# Patient Record
Sex: FEMALE | Race: WHITE | Employment: UNEMPLOYED | URBAN - METROPOLITAN AREA
[De-identification: names, ages, dates, MRNs, and addresses within clinical notes are randomized per-mention and may not be internally consistent; named-entity substitution may affect disease eponyms.]

---

## 2018-06-15 ENCOUNTER — HOSPITAL ENCOUNTER (OUTPATIENT)
Age: 78
Discharge: HOME | End: 2018-06-17
Payer: MEDICARE

## 2018-06-15 PROCEDURE — 82306 VITAMIN D 25 HYDROXY: CPT

## 2018-06-15 PROCEDURE — 80061 LIPID PANEL: CPT

## 2018-06-15 PROCEDURE — 80053 COMPREHEN METABOLIC PANEL: CPT

## 2018-06-15 PROCEDURE — 86038 ANTINUCLEAR ANTIBODIES: CPT

## 2018-06-15 PROCEDURE — 86431 RHEUMATOID FACTOR QUANT: CPT

## 2018-06-15 PROCEDURE — 84443 ASSAY THYROID STIM HORMONE: CPT

## 2018-06-16 LAB
ALBUMIN SERPL-MCNC: 3.9 G/DL (ref 3.5–5.2)
ALP BLD-CCNC: 86 U/L (ref 35–104)
ALT SERPL-CCNC: 15 U/L (ref 0–32)
ANION GAP SERPL CALCULATED.3IONS-SCNC: 17 MMOL/L (ref 7–16)
AST SERPL-CCNC: 20 U/L (ref 0–31)
BILIRUB SERPL-MCNC: 0.3 MG/DL (ref 0–1.2)
BUN BLDV-MCNC: 16 MG/DL (ref 8–23)
CALCIUM SERPL-MCNC: 9.7 MG/DL (ref 8.6–10.2)
CHLORIDE BLD-SCNC: 100 MMOL/L (ref 98–107)
CHOLESTEROL, TOTAL: 253 MG/DL (ref 0–199)
CO2: 22 MMOL/L (ref 22–29)
CREAT SERPL-MCNC: 0.8 MG/DL (ref 0.5–1)
GFR AFRICAN AMERICAN: >60
GFR NON-AFRICAN AMERICAN: >60 ML/MIN/1.73
GLUCOSE BLD-MCNC: 86 MG/DL (ref 74–109)
HDLC SERPL-MCNC: 38 MG/DL
LDL CHOLESTEROL CALCULATED: 190 MG/DL (ref 0–99)
POTASSIUM SERPL-SCNC: 4.5 MMOL/L (ref 3.5–5)
RHEUMATOID FACTOR: <10 IU/ML (ref 0–13)
SODIUM BLD-SCNC: 139 MMOL/L (ref 132–146)
TOTAL PROTEIN: 7.2 G/DL (ref 6.4–8.3)
TRIGL SERPL-MCNC: 125 MG/DL (ref 0–149)
TSH SERPL DL<=0.05 MIU/L-ACNC: 2.06 UIU/ML (ref 0.27–4.2)
VITAMIN D 25-HYDROXY: 11 NG/ML (ref 30–100)
VLDLC SERPL CALC-MCNC: 25 MG/DL

## 2018-06-18 LAB — ANTI-NUCLEAR ANTIBODY (ANA): NEGATIVE

## 2018-10-02 ENCOUNTER — HOSPITAL ENCOUNTER (OUTPATIENT)
Age: 78
Discharge: HOME | End: 2018-10-04
Payer: MEDICARE

## 2018-10-02 LAB
CHOLESTEROL, TOTAL: 173 MG/DL (ref 0–199)
HDLC SERPL-MCNC: 44 MG/DL
LDL CHOLESTEROL CALCULATED: 110 MG/DL (ref 0–99)
TRIGL SERPL-MCNC: 96 MG/DL (ref 0–149)
URIC ACID, SERUM: 4.2 MG/DL (ref 2.4–5.7)
VLDLC SERPL CALC-MCNC: 19 MG/DL

## 2018-10-02 PROCEDURE — 84550 ASSAY OF BLOOD/URIC ACID: CPT

## 2018-10-02 PROCEDURE — 80061 LIPID PANEL: CPT

## 2020-08-10 ENCOUNTER — HOSPITAL ENCOUNTER (OUTPATIENT)
Age: 80
Discharge: HOME | End: 2020-08-12
Payer: MEDICARE

## 2020-08-10 LAB
ALBUMIN SERPL-MCNC: 4.1 G/DL (ref 3.5–5.2)
ALP BLD-CCNC: 64 U/L (ref 35–104)
ALT SERPL-CCNC: 13 U/L (ref 0–32)
ANION GAP SERPL CALCULATED.3IONS-SCNC: 11 MMOL/L (ref 7–16)
AST SERPL-CCNC: 24 U/L (ref 0–31)
BILIRUB SERPL-MCNC: 0.3 MG/DL (ref 0–1.2)
BUN BLDV-MCNC: 17 MG/DL (ref 8–23)
CALCIUM SERPL-MCNC: 10.6 MG/DL (ref 8.6–10.2)
CHLORIDE BLD-SCNC: 102 MMOL/L (ref 98–107)
CO2: 28 MMOL/L (ref 22–29)
CREAT SERPL-MCNC: 0.9 MG/DL (ref 0.5–1)
GFR AFRICAN AMERICAN: >60
GFR NON-AFRICAN AMERICAN: >60 ML/MIN/1.73
GLUCOSE BLD-MCNC: 87 MG/DL (ref 74–99)
POTASSIUM SERPL-SCNC: 5.1 MMOL/L (ref 3.5–5)
SODIUM BLD-SCNC: 141 MMOL/L (ref 132–146)
TOTAL PROTEIN: 7.2 G/DL (ref 6.4–8.3)

## 2020-08-10 PROCEDURE — 80053 COMPREHEN METABOLIC PANEL: CPT

## 2022-11-15 LAB
ALBUMIN SERPL-MCNC: 4.4 G/DL (ref 3.5–5.2)
ALP BLD-CCNC: 82 U/L (ref 35–104)
ALT SERPL-CCNC: 14 U/L (ref 0–32)
ANION GAP SERPL CALCULATED.3IONS-SCNC: 9 MMOL/L (ref 7–16)
AST SERPL-CCNC: 23 U/L (ref 0–31)
BASOPHILS ABSOLUTE: 0.03 E9/L (ref 0–0.2)
BASOPHILS RELATIVE PERCENT: 0.5 % (ref 0–2)
BILIRUB SERPL-MCNC: 0.3 MG/DL (ref 0–1.2)
BUN BLDV-MCNC: 15 MG/DL (ref 6–23)
CALCIUM SERPL-MCNC: 11.4 MG/DL (ref 8.6–10.2)
CHLORIDE BLD-SCNC: 100 MMOL/L (ref 98–107)
CHOLESTEROL, TOTAL: 278 MG/DL (ref 0–199)
CO2: 29 MMOL/L (ref 22–29)
CREAT SERPL-MCNC: 0.8 MG/DL (ref 0.5–1)
EOSINOPHILS ABSOLUTE: 0.06 E9/L (ref 0.05–0.5)
EOSINOPHILS RELATIVE PERCENT: 1 % (ref 0–6)
GFR SERPL CREATININE-BSD FRML MDRD: >60 ML/MIN/1.73
GLUCOSE BLD-MCNC: 85 MG/DL (ref 74–99)
HBA1C MFR BLD: 5.7 % (ref 4–5.6)
HCT VFR BLD CALC: 47.9 % (ref 34–48)
HDLC SERPL-MCNC: 53 MG/DL
HEMOGLOBIN: 15.3 G/DL (ref 11.5–15.5)
IMMATURE GRANULOCYTES #: 0.01 E9/L
IMMATURE GRANULOCYTES %: 0.2 % (ref 0–5)
LDL CHOLESTEROL CALCULATED: 197 MG/DL (ref 0–99)
LYMPHOCYTES ABSOLUTE: 2.24 E9/L (ref 1.5–4)
LYMPHOCYTES RELATIVE PERCENT: 36.4 % (ref 20–42)
MCH RBC QN AUTO: 31.1 PG (ref 26–35)
MCHC RBC AUTO-ENTMCNC: 31.9 % (ref 32–34.5)
MCV RBC AUTO: 97.4 FL (ref 80–99.9)
MONOCYTES ABSOLUTE: 0.51 E9/L (ref 0.1–0.95)
MONOCYTES RELATIVE PERCENT: 8.3 % (ref 2–12)
NEUTROPHILS ABSOLUTE: 3.3 E9/L (ref 1.8–7.3)
NEUTROPHILS RELATIVE PERCENT: 53.6 % (ref 43–80)
PDW BLD-RTO: 14.2 FL (ref 11.5–15)
PLATELET # BLD: 209 E9/L (ref 130–450)
PMV BLD AUTO: 11.3 FL (ref 7–12)
POTASSIUM SERPL-SCNC: 5.3 MMOL/L (ref 3.5–5)
RBC # BLD: 4.92 E12/L (ref 3.5–5.5)
SODIUM BLD-SCNC: 138 MMOL/L (ref 132–146)
TOTAL PROTEIN: 7.7 G/DL (ref 6.4–8.3)
TRIGL SERPL-MCNC: 142 MG/DL (ref 0–149)
TSH SERPL DL<=0.05 MIU/L-ACNC: 2.22 UIU/ML (ref 0.27–4.2)
VITAMIN D 25-HYDROXY: 24 NG/ML (ref 30–100)
VLDLC SERPL CALC-MCNC: 28 MG/DL
WBC # BLD: 6.2 E9/L (ref 4.5–11.5)

## 2023-03-31 ENCOUNTER — APPOINTMENT (OUTPATIENT)
Dept: CT IMAGING | Age: 83
DRG: 193 | End: 2023-03-31
Payer: MEDICARE

## 2023-03-31 ENCOUNTER — HOSPITAL ENCOUNTER (INPATIENT)
Age: 83
LOS: 4 days | Discharge: HOME HEALTH | DRG: 193 | End: 2023-04-04
Attending: STUDENT IN AN ORGANIZED HEALTH CARE EDUCATION/TRAINING PROGRAM | Admitting: STUDENT IN AN ORGANIZED HEALTH CARE EDUCATION/TRAINING PROGRAM
Payer: MEDICARE

## 2023-03-31 ENCOUNTER — APPOINTMENT (OUTPATIENT)
Dept: GENERAL RADIOLOGY | Age: 83
DRG: 193 | End: 2023-03-31
Payer: MEDICARE

## 2023-03-31 DIAGNOSIS — R79.89 ELEVATED LACTIC ACID LEVEL: ICD-10-CM

## 2023-03-31 DIAGNOSIS — J18.9 PNEUMONIA OF BOTH LUNGS DUE TO INFECTIOUS ORGANISM, UNSPECIFIED PART OF LUNG: Primary | ICD-10-CM

## 2023-03-31 DIAGNOSIS — E83.52 HYPERCALCEMIA: ICD-10-CM

## 2023-03-31 DIAGNOSIS — J96.01 ACUTE RESPIRATORY FAILURE WITH HYPOXIA (HCC): ICD-10-CM

## 2023-03-31 DIAGNOSIS — I48.19 PERSISTENT ATRIAL FIBRILLATION (HCC): ICD-10-CM

## 2023-03-31 DIAGNOSIS — N17.9 AKI (ACUTE KIDNEY INJURY) (HCC): ICD-10-CM

## 2023-03-31 PROBLEM — R26.2 DISABILITY OF WALKING: Status: ACTIVE | Noted: 2017-12-20

## 2023-03-31 PROBLEM — J44.1 COPD EXACERBATION (HCC): Status: ACTIVE | Noted: 2023-03-31

## 2023-03-31 PROBLEM — E87.20 LACTIC ACIDOSIS: Status: ACTIVE | Noted: 2023-03-31

## 2023-03-31 PROBLEM — G60.3 IDIOPATHIC PROGRESSIVE POLYNEUROPATHY: Status: ACTIVE | Noted: 2017-12-20

## 2023-03-31 LAB
ANION GAP SERPL CALCULATED.3IONS-SCNC: 16 MMOL/L (ref 7–16)
B PARAP IS1001 DNA NPH QL NAA+NON-PROBE: NOT DETECTED
B PERT.PT PRMT NPH QL NAA+NON-PROBE: NOT DETECTED
B.E.: -1.9 MMOL/L (ref -3–3)
BASOPHILS # BLD: 0 E9/L (ref 0–0.2)
BASOPHILS NFR BLD: 0 % (ref 0–2)
BNP BLD-MCNC: 6194 PG/ML (ref 0–450)
BNP BLD-MCNC: 6843 PG/ML (ref 0–450)
BUN SERPL-MCNC: 42 MG/DL (ref 6–23)
C PNEUM DNA NPH QL NAA+NON-PROBE: NOT DETECTED
CALCIUM SERPL-MCNC: 11 MG/DL (ref 8.6–10.2)
CHLORIDE SERPL-SCNC: 98 MMOL/L (ref 98–107)
CO2 SERPL-SCNC: 23 MMOL/L (ref 22–29)
COHB: 1.8 % (ref 0–1.5)
CREAT SERPL-MCNC: 1.4 MG/DL (ref 0.5–1)
CRITICAL: ABNORMAL
D DIMER: 3127 NG/ML DDU
DATE ANALYZED: ABNORMAL
DATE OF COLLECTION: ABNORMAL
EOSINOPHIL # BLD: 0 E9/L (ref 0.05–0.5)
EOSINOPHIL NFR BLD: 0 % (ref 0–6)
ERYTHROCYTE [DISTWIDTH] IN BLOOD BY AUTOMATED COUNT: 14.6 FL (ref 11.5–15)
FLUAV RNA NPH QL NAA+NON-PROBE: NOT DETECTED
FLUBV RNA NPH QL NAA+NON-PROBE: NOT DETECTED
GLUCOSE SERPL-MCNC: 125 MG/DL (ref 74–99)
HADV DNA NPH QL NAA+NON-PROBE: NOT DETECTED
HCO3: 23.5 MMOL/L (ref 22–26)
HCOV 229E RNA NPH QL NAA+NON-PROBE: NOT DETECTED
HCOV HKU1 RNA NPH QL NAA+NON-PROBE: NOT DETECTED
HCOV NL63 RNA NPH QL NAA+NON-PROBE: NOT DETECTED
HCOV OC43 RNA NPH QL NAA+NON-PROBE: NOT DETECTED
HCT VFR BLD AUTO: 50.3 % (ref 34–48)
HGB BLD-MCNC: 15.9 G/DL (ref 11.5–15.5)
HHB: 11.9 % (ref 0–5)
HMPV RNA NPH QL NAA+NON-PROBE: DETECTED
HPIV1 RNA NPH QL NAA+NON-PROBE: NOT DETECTED
HPIV2 RNA NPH QL NAA+NON-PROBE: NOT DETECTED
HPIV3 RNA NPH QL NAA+NON-PROBE: NOT DETECTED
HPIV4 RNA NPH QL NAA+NON-PROBE: NOT DETECTED
INFLUENZA A BY PCR: NOT DETECTED
INFLUENZA B BY PCR: NOT DETECTED
LAB: ABNORMAL
LACTATE BLDV-SCNC: 2.4 MMOL/L (ref 0.5–2.2)
LACTATE BLDV-SCNC: 3.3 MMOL/L (ref 0.5–2.2)
LIPASE: 13 U/L (ref 13–60)
LYMPHOCYTES # BLD: 0.63 E9/L (ref 1.5–4)
LYMPHOCYTES NFR BLD: 6.1 % (ref 20–42)
Lab: ABNORMAL
M PNEUMO DNA NPH QL NAA+NON-PROBE: NOT DETECTED
MCH RBC QN AUTO: 31.2 PG (ref 26–35)
MCHC RBC AUTO-ENTMCNC: 31.6 % (ref 32–34.5)
MCV RBC AUTO: 98.6 FL (ref 80–99.9)
METHB: 0.3 % (ref 0–1.5)
MODE: ABNORMAL
MONOCYTES # BLD: 0.52 E9/L (ref 0.1–0.95)
MONOCYTES NFR BLD: 5.2 % (ref 2–12)
NEUTROPHILS # BLD: 9.35 E9/L (ref 1.8–7.3)
NEUTS SEG NFR BLD: 88.7 % (ref 43–80)
NRBC BLD-RTO: 0.9 /100 WBC
O2 CONTENT: 19.3 ML/DL
O2 SATURATION: 87.8 % (ref 92–98.5)
O2HB: 86 % (ref 94–97)
OPERATOR ID: 913
PATIENT TEMP: 37 C
PCO2: 42.3 MMHG (ref 35–45)
PH BLOOD GAS: 7.36 (ref 7.35–7.45)
PLATELET # BLD AUTO: 198 E9/L (ref 130–450)
PMV BLD AUTO: 10.9 FL (ref 7–12)
PO2: 55.5 MMHG (ref 75–100)
POTASSIUM SERPL-SCNC: 4.4 MMOL/L (ref 3.5–5)
RBC # BLD AUTO: 5.1 E12/L (ref 3.5–5.5)
RBC MORPH BLD: NORMAL
RSV RNA NPH QL NAA+NON-PROBE: NOT DETECTED
RV+EV RNA NPH QL NAA+NON-PROBE: NOT DETECTED
SARS-COV-2 RDRP RESP QL NAA+PROBE: NOT DETECTED
SARS-COV-2 RNA NPH QL NAA+NON-PROBE: NOT DETECTED
SODIUM SERPL-SCNC: 137 MMOL/L (ref 132–146)
SOURCE, BLOOD GAS: ABNORMAL
THB: 16 G/DL (ref 11.5–16.5)
TIME ANALYZED: 1700
TROPONIN, HIGH SENSITIVITY: 29 NG/L (ref 0–9)
TROPONIN, HIGH SENSITIVITY: 34 NG/L (ref 0–9)
WBC # BLD: 10.5 E9/L (ref 4.5–11.5)

## 2023-03-31 PROCEDURE — 85025 COMPLETE CBC W/AUTO DIFF WBC: CPT

## 2023-03-31 PROCEDURE — 6360000004 HC RX CONTRAST MEDICATION: Performed by: RADIOLOGY

## 2023-03-31 PROCEDURE — 93005 ELECTROCARDIOGRAM TRACING: CPT

## 2023-03-31 PROCEDURE — 99285 EMERGENCY DEPT VISIT HI MDM: CPT

## 2023-03-31 PROCEDURE — 2580000003 HC RX 258: Performed by: STUDENT IN AN ORGANIZED HEALTH CARE EDUCATION/TRAINING PROGRAM

## 2023-03-31 PROCEDURE — 2060000000 HC ICU INTERMEDIATE R&B

## 2023-03-31 PROCEDURE — 96367 TX/PROPH/DG ADDL SEQ IV INF: CPT

## 2023-03-31 PROCEDURE — 84484 ASSAY OF TROPONIN QUANT: CPT

## 2023-03-31 PROCEDURE — 6360000002 HC RX W HCPCS: Performed by: STUDENT IN AN ORGANIZED HEALTH CARE EDUCATION/TRAINING PROGRAM

## 2023-03-31 PROCEDURE — 83880 ASSAY OF NATRIURETIC PEPTIDE: CPT

## 2023-03-31 PROCEDURE — 87040 BLOOD CULTURE FOR BACTERIA: CPT

## 2023-03-31 PROCEDURE — APPSS60 APP SPLIT SHARED TIME 46-60 MINUTES: Performed by: NURSE PRACTITIONER

## 2023-03-31 PROCEDURE — 6360000002 HC RX W HCPCS

## 2023-03-31 PROCEDURE — 81001 URINALYSIS AUTO W/SCOPE: CPT

## 2023-03-31 PROCEDURE — 71275 CT ANGIOGRAPHY CHEST: CPT

## 2023-03-31 PROCEDURE — 6370000000 HC RX 637 (ALT 250 FOR IP)

## 2023-03-31 PROCEDURE — 87088 URINE BACTERIA CULTURE: CPT

## 2023-03-31 PROCEDURE — 80048 BASIC METABOLIC PNL TOTAL CA: CPT

## 2023-03-31 PROCEDURE — 85378 FIBRIN DEGRADE SEMIQUANT: CPT

## 2023-03-31 PROCEDURE — 96361 HYDRATE IV INFUSION ADD-ON: CPT

## 2023-03-31 PROCEDURE — 94640 AIRWAY INHALATION TREATMENT: CPT

## 2023-03-31 PROCEDURE — 2500000003 HC RX 250 WO HCPCS

## 2023-03-31 PROCEDURE — 71045 X-RAY EXAM CHEST 1 VIEW: CPT

## 2023-03-31 PROCEDURE — 96365 THER/PROPH/DIAG IV INF INIT: CPT

## 2023-03-31 PROCEDURE — 2580000003 HC RX 258

## 2023-03-31 PROCEDURE — 87502 INFLUENZA DNA AMP PROBE: CPT

## 2023-03-31 PROCEDURE — 83605 ASSAY OF LACTIC ACID: CPT

## 2023-03-31 PROCEDURE — 94660 CPAP INITIATION&MGMT: CPT

## 2023-03-31 PROCEDURE — 82805 BLOOD GASES W/O2 SATURATION: CPT

## 2023-03-31 PROCEDURE — 87635 SARS-COV-2 COVID-19 AMP PRB: CPT

## 2023-03-31 PROCEDURE — 94664 DEMO&/EVAL PT USE INHALER: CPT

## 2023-03-31 PROCEDURE — 83690 ASSAY OF LIPASE: CPT

## 2023-03-31 PROCEDURE — 0202U NFCT DS 22 TRGT SARS-COV-2: CPT

## 2023-03-31 PROCEDURE — 96375 TX/PRO/DX INJ NEW DRUG ADDON: CPT

## 2023-03-31 RX ORDER — IPRATROPIUM BROMIDE AND ALBUTEROL SULFATE 2.5; .5 MG/3ML; MG/3ML
1 SOLUTION RESPIRATORY (INHALATION) ONCE
Status: COMPLETED | OUTPATIENT
Start: 2023-03-31 | End: 2023-03-31

## 2023-03-31 RX ORDER — SODIUM CHLORIDE 0.9 % (FLUSH) 0.9 %
5-40 SYRINGE (ML) INJECTION PRN
Status: DISCONTINUED | OUTPATIENT
Start: 2023-03-31 | End: 2023-04-04 | Stop reason: HOSPADM

## 2023-03-31 RX ORDER — MAGNESIUM SULFATE IN WATER 40 MG/ML
2000 INJECTION, SOLUTION INTRAVENOUS ONCE
Status: COMPLETED | OUTPATIENT
Start: 2023-03-31 | End: 2023-03-31

## 2023-03-31 RX ORDER — IPRATROPIUM BROMIDE AND ALBUTEROL SULFATE 2.5; .5 MG/3ML; MG/3ML
3 SOLUTION RESPIRATORY (INHALATION) ONCE
Status: COMPLETED | OUTPATIENT
Start: 2023-03-31 | End: 2023-03-31

## 2023-03-31 RX ORDER — SODIUM CHLORIDE 0.9 % (FLUSH) 0.9 %
5-40 SYRINGE (ML) INJECTION EVERY 12 HOURS SCHEDULED
Status: DISCONTINUED | OUTPATIENT
Start: 2023-03-31 | End: 2023-04-04 | Stop reason: HOSPADM

## 2023-03-31 RX ORDER — ALBUTEROL SULFATE 2.5 MG/3ML
2.5 SOLUTION RESPIRATORY (INHALATION) EVERY 4 HOURS PRN
Status: DISCONTINUED | OUTPATIENT
Start: 2023-03-31 | End: 2023-04-04 | Stop reason: HOSPADM

## 2023-03-31 RX ORDER — ACETAMINOPHEN 650 MG/1
650 SUPPOSITORY RECTAL EVERY 6 HOURS PRN
Status: DISCONTINUED | OUTPATIENT
Start: 2023-03-31 | End: 2023-04-04 | Stop reason: HOSPADM

## 2023-03-31 RX ORDER — 0.9 % SODIUM CHLORIDE 0.9 %
1000 INTRAVENOUS SOLUTION INTRAVENOUS ONCE
Status: COMPLETED | OUTPATIENT
Start: 2023-03-31 | End: 2023-03-31

## 2023-03-31 RX ORDER — IPRATROPIUM BROMIDE AND ALBUTEROL SULFATE 2.5; .5 MG/3ML; MG/3ML
1 SOLUTION RESPIRATORY (INHALATION)
Status: DISCONTINUED | OUTPATIENT
Start: 2023-03-31 | End: 2023-03-31

## 2023-03-31 RX ORDER — MECLIZINE HCL 12.5 MG/1
25 TABLET ORAL 3 TIMES DAILY PRN
Status: DISCONTINUED | OUTPATIENT
Start: 2023-03-31 | End: 2023-04-04 | Stop reason: HOSPADM

## 2023-03-31 RX ORDER — DOXYCYCLINE HYCLATE 100 MG/1
100 CAPSULE ORAL EVERY 12 HOURS SCHEDULED
Status: DISCONTINUED | OUTPATIENT
Start: 2023-03-31 | End: 2023-03-31

## 2023-03-31 RX ORDER — ALBUTEROL SULFATE 2.5 MG/3ML
2.5 SOLUTION RESPIRATORY (INHALATION)
Status: DISCONTINUED | OUTPATIENT
Start: 2023-04-01 | End: 2023-04-03

## 2023-03-31 RX ORDER — METHYLPREDNISOLONE SODIUM SUCCINATE 40 MG/ML
40 INJECTION, POWDER, LYOPHILIZED, FOR SOLUTION INTRAMUSCULAR; INTRAVENOUS EVERY 8 HOURS
Status: DISCONTINUED | OUTPATIENT
Start: 2023-03-31 | End: 2023-03-31

## 2023-03-31 RX ORDER — ONDANSETRON 2 MG/ML
4 INJECTION INTRAMUSCULAR; INTRAVENOUS EVERY 6 HOURS PRN
Status: DISCONTINUED | OUTPATIENT
Start: 2023-03-31 | End: 2023-04-04 | Stop reason: HOSPADM

## 2023-03-31 RX ORDER — ONDANSETRON 4 MG/1
4 TABLET, ORALLY DISINTEGRATING ORAL EVERY 8 HOURS PRN
Status: DISCONTINUED | OUTPATIENT
Start: 2023-03-31 | End: 2023-04-04 | Stop reason: HOSPADM

## 2023-03-31 RX ORDER — DILTIAZEM HYDROCHLORIDE 5 MG/ML
10 INJECTION INTRAVENOUS ONCE
Status: COMPLETED | OUTPATIENT
Start: 2023-03-31 | End: 2023-04-01

## 2023-03-31 RX ORDER — DEXAMETHASONE SODIUM PHOSPHATE 10 MG/ML
10 INJECTION INTRAMUSCULAR; INTRAVENOUS ONCE
Status: DISCONTINUED | OUTPATIENT
Start: 2023-03-31 | End: 2023-03-31

## 2023-03-31 RX ORDER — ACETAMINOPHEN 500 MG
1000 TABLET ORAL ONCE
Status: DISCONTINUED | OUTPATIENT
Start: 2023-03-31 | End: 2023-04-04 | Stop reason: HOSPADM

## 2023-03-31 RX ORDER — MECLIZINE HYDROCHLORIDE 25 MG/1
TABLET ORAL
Status: ON HOLD | COMMUNITY
Start: 2023-01-03 | End: 2023-04-04 | Stop reason: HOSPADM

## 2023-03-31 RX ORDER — SODIUM CHLORIDE 9 MG/ML
INJECTION, SOLUTION INTRAVENOUS PRN
Status: DISCONTINUED | OUTPATIENT
Start: 2023-03-31 | End: 2023-04-04 | Stop reason: HOSPADM

## 2023-03-31 RX ORDER — DOXYCYCLINE HYCLATE 100 MG/1
100 CAPSULE ORAL EVERY 12 HOURS SCHEDULED
Status: DISCONTINUED | OUTPATIENT
Start: 2023-04-01 | End: 2023-04-04 | Stop reason: HOSPADM

## 2023-03-31 RX ORDER — ENOXAPARIN SODIUM 100 MG/ML
40 INJECTION SUBCUTANEOUS DAILY
Status: DISCONTINUED | OUTPATIENT
Start: 2023-04-01 | End: 2023-04-01

## 2023-03-31 RX ORDER — DILTIAZEM HYDROCHLORIDE 5 MG/ML
10 INJECTION INTRAVENOUS ONCE
Status: DISCONTINUED | OUTPATIENT
Start: 2023-03-31 | End: 2023-03-31 | Stop reason: CLARIF

## 2023-03-31 RX ORDER — METHYLPREDNISOLONE SODIUM SUCCINATE 40 MG/ML
40 INJECTION, POWDER, LYOPHILIZED, FOR SOLUTION INTRAMUSCULAR; INTRAVENOUS EVERY 8 HOURS
Status: DISCONTINUED | OUTPATIENT
Start: 2023-04-01 | End: 2023-04-02

## 2023-03-31 RX ORDER — ACETAMINOPHEN 325 MG/1
650 TABLET ORAL EVERY 6 HOURS PRN
Status: DISCONTINUED | OUTPATIENT
Start: 2023-03-31 | End: 2023-04-04 | Stop reason: HOSPADM

## 2023-03-31 RX ORDER — ASPIRIN 81 MG/1
81 TABLET, CHEWABLE ORAL DAILY
Status: DISCONTINUED | OUTPATIENT
Start: 2023-04-01 | End: 2023-04-02

## 2023-03-31 RX ORDER — METOPROLOL SUCCINATE 25 MG/1
25 TABLET, EXTENDED RELEASE ORAL DAILY
Status: DISCONTINUED | OUTPATIENT
Start: 2023-04-01 | End: 2023-04-02

## 2023-03-31 RX ORDER — 0.9 % SODIUM CHLORIDE 0.9 %
1000 INTRAVENOUS SOLUTION INTRAVENOUS ONCE
Status: DISCONTINUED | OUTPATIENT
Start: 2023-03-31 | End: 2023-03-31

## 2023-03-31 RX ORDER — METHYLPREDNISOLONE SODIUM SUCCINATE 125 MG/2ML
125 INJECTION, POWDER, LYOPHILIZED, FOR SOLUTION INTRAMUSCULAR; INTRAVENOUS ONCE
Status: COMPLETED | OUTPATIENT
Start: 2023-03-31 | End: 2023-03-31

## 2023-03-31 RX ORDER — SODIUM CHLORIDE, SODIUM LACTATE, POTASSIUM CHLORIDE, CALCIUM CHLORIDE 600; 310; 30; 20 MG/100ML; MG/100ML; MG/100ML; MG/100ML
INJECTION, SOLUTION INTRAVENOUS CONTINUOUS
Status: DISCONTINUED | OUTPATIENT
Start: 2023-03-31 | End: 2023-04-04

## 2023-03-31 RX ADMIN — DOXYCYCLINE 100 MG: 100 INJECTION, POWDER, LYOPHILIZED, FOR SOLUTION INTRAVENOUS at 18:46

## 2023-03-31 RX ADMIN — SODIUM CHLORIDE 1000 ML: 9 INJECTION, SOLUTION INTRAVENOUS at 20:37

## 2023-03-31 RX ADMIN — METHYLPREDNISOLONE SODIUM SUCCINATE 125 MG: 125 INJECTION, POWDER, FOR SOLUTION INTRAMUSCULAR; INTRAVENOUS at 18:12

## 2023-03-31 RX ADMIN — IOPAMIDOL 75 ML: 755 INJECTION, SOLUTION INTRAVENOUS at 21:06

## 2023-03-31 RX ADMIN — MAGNESIUM SULFATE HEPTAHYDRATE 2000 MG: 40 INJECTION, SOLUTION INTRAVENOUS at 18:20

## 2023-03-31 RX ADMIN — SODIUM CHLORIDE 1000 ML: 9 INJECTION, SOLUTION INTRAVENOUS at 18:16

## 2023-03-31 RX ADMIN — IPRATROPIUM BROMIDE AND ALBUTEROL SULFATE 1 AMPULE: 2.5; .5 SOLUTION RESPIRATORY (INHALATION) at 16:49

## 2023-03-31 RX ADMIN — WATER 2000 MG: 1 INJECTION INTRAMUSCULAR; INTRAVENOUS; SUBCUTANEOUS at 18:30

## 2023-03-31 RX ADMIN — IPRATROPIUM BROMIDE AND ALBUTEROL SULFATE 3 AMPULE: 2.5; .5 SOLUTION RESPIRATORY (INHALATION) at 18:01

## 2023-03-31 ASSESSMENT — PAIN - FUNCTIONAL ASSESSMENT: PAIN_FUNCTIONAL_ASSESSMENT: 0-10

## 2023-03-31 ASSESSMENT — PAIN SCALES - GENERAL: PAINLEVEL_OUTOF10: 7

## 2023-03-31 NOTE — ED PROVIDER NOTES
03/31/23 2105 -- -- -- -- 23 -- -- --   03/31/23 2102 (!) 127/95 -- -- (!) 113 18 -- -- --   03/31/23 2032 119/68 -- -- (!) 144 26 98 % -- --   03/31/23 2017 121/65 -- -- (!) 118 26 91 % -- --   03/31/23 2005 111/78 -- -- (!) 115 24 92 % -- --   03/31/23 2002 110/65 -- -- (!) 114 24 -- -- --   03/31/23 1947 117/73 -- -- (!) 112 21 93 % -- --   03/31/23 1910 110/61 -- -- -- -- -- -- --   03/31/23 1845 87/65 -- -- (!) 121 20 -- -- --   03/31/23 1832 -- -- -- -- 25 -- -- --   03/31/23 1830 -- 97.9 °F (36.6 °C) Oral -- -- -- -- --   03/31/23 1828 (!) 104/54 -- -- (!) 135 25 97 % -- --   03/31/23 1815 (!) 90/56 -- -- (!) 126 26 94 % -- --   03/31/23 1745 -- -- -- (!) 148 27 94 % -- --   03/31/23 1540 119/68 98.8 °F (37.1 °C) Oral 61 20 96 % 5' 8\" (1.727 m) 160 lb (72.6 kg)   03/31/23 1510 -- -- -- -- 20 90 % -- --       Oxygen Saturation Interpretation: Normal      Counseling:  I have spoken with the patient and discussed todays results, in addition to providing specific details for the plan of care and counseling regarding the diagnosis and prognosis. Their questions are answered at this time and they are agreeable with the plan of admission. This patient has remained hemodynamically stable during their ED course. Diagnosis:  1. Pneumonia of both lungs due to infectious organism, unspecified part of lung    2. WERNER (acute kidney injury) ([facility] [address]    3. Hypercalcemia    4. Acute respiratory failure with hypoxia (HCC)    5. Elevated lactic acid level    6. Persistent atrial fibrillation (HCC)        Disposition:  Patient's disposition: Admit to medical floor  Patient's condition is stable. Attending was present and available throughout encounter including all critical portions;  See Attending Note/Attestation for Final Plan      Lisa Pop DO  Resident  03/31/23 1243

## 2023-04-01 PROBLEM — R79.89 ELEVATED LACTIC ACID LEVEL: Status: ACTIVE | Noted: 2023-04-01

## 2023-04-01 PROBLEM — I48.19 PERSISTENT ATRIAL FIBRILLATION (HCC): Status: ACTIVE | Noted: 2023-04-01

## 2023-04-01 LAB
ALBUMIN SERPL-MCNC: 3.2 G/DL (ref 3.5–5.2)
ALP SERPL-CCNC: 96 U/L (ref 35–104)
ALT SERPL-CCNC: 14 U/L (ref 0–32)
ANION GAP SERPL CALCULATED.3IONS-SCNC: 11 MMOL/L (ref 7–16)
AST SERPL-CCNC: 20 U/L (ref 0–31)
BACTERIA URNS QL MICRO: ABNORMAL /HPF
BASOPHILS # BLD: 0 E9/L (ref 0–0.2)
BASOPHILS NFR BLD: 0 % (ref 0–2)
BILIRUB SERPL-MCNC: 0.5 MG/DL (ref 0–1.2)
BILIRUB UR QL STRIP: NEGATIVE
BUN SERPL-MCNC: 36 MG/DL (ref 6–23)
BURR CELLS: ABNORMAL
CALCIUM SERPL-MCNC: 10.7 MG/DL (ref 8.6–10.2)
CHLORIDE SERPL-SCNC: 102 MMOL/L (ref 98–107)
CLARITY UR: ABNORMAL
CO2 SERPL-SCNC: 25 MMOL/L (ref 22–29)
COLOR UR: YELLOW
CREAT SERPL-MCNC: 0.9 MG/DL (ref 0.5–1)
EKG ATRIAL RATE: 104 BPM
EKG Q-T INTERVAL: 256 MS
EKG QRS DURATION: 76 MS
EKG QTC CALCULATION (BAZETT): 378 MS
EKG R AXIS: 19 DEGREES
EKG T AXIS: 58 DEGREES
EKG VENTRICULAR RATE: 131 BPM
EOSINOPHIL # BLD: 0 E9/L (ref 0.05–0.5)
EOSINOPHIL NFR BLD: 0 % (ref 0–6)
EPI CELLS #/AREA URNS HPF: ABNORMAL /HPF
ERYTHROCYTE [DISTWIDTH] IN BLOOD BY AUTOMATED COUNT: 14.6 FL (ref 11.5–15)
GLUCOSE SERPL-MCNC: 136 MG/DL (ref 74–99)
GLUCOSE UR STRIP-MCNC: NEGATIVE MG/DL
HCT VFR BLD AUTO: 43.2 % (ref 34–48)
HGB BLD-MCNC: 13.9 G/DL (ref 11.5–15.5)
HGB UR QL STRIP: ABNORMAL
KETONES UR STRIP-MCNC: ABNORMAL MG/DL
LACTATE BLDV-SCNC: 1.8 MMOL/L (ref 0.5–2.2)
LEUKOCYTE ESTERASE UR QL STRIP: ABNORMAL
LYMPHOCYTES # BLD: 0.69 E9/L (ref 1.5–4)
LYMPHOCYTES NFR BLD: 7.1 % (ref 20–42)
MAGNESIUM SERPL-MCNC: 2.3 MG/DL (ref 1.6–2.6)
MCH RBC QN AUTO: 30.9 PG (ref 26–35)
MCHC RBC AUTO-ENTMCNC: 32.2 % (ref 32–34.5)
MCV RBC AUTO: 96 FL (ref 80–99.9)
MONOCYTES # BLD: 0.2 E9/L (ref 0.1–0.95)
MONOCYTES NFR BLD: 1.8 % (ref 2–12)
NEUTROPHILS # BLD: 8.91 E9/L (ref 1.8–7.3)
NEUTS SEG NFR BLD: 90.2 % (ref 43–80)
NITRITE UR QL STRIP: NEGATIVE
NRBC BLD-RTO: 0 /100 WBC
PH UR STRIP: 5 [PH] (ref 5–9)
PLATELET # BLD AUTO: 207 E9/L (ref 130–450)
PMV BLD AUTO: 11.1 FL (ref 7–12)
POIKILOCYTES: ABNORMAL
POLYCHROMASIA: ABNORMAL
POTASSIUM SERPL-SCNC: 3.4 MMOL/L (ref 3.5–5)
PROCALCITONIN: 2.52 NG/ML (ref 0–0.08)
PROMYELOCYTES NFR BLD MANUAL: 0.9 % (ref 0–0)
PROT SERPL-MCNC: 7.3 G/DL (ref 6.4–8.3)
PROT UR STRIP-MCNC: 100 MG/DL
RBC # BLD AUTO: 4.5 E12/L (ref 3.5–5.5)
RBC #/AREA URNS HPF: ABNORMAL /HPF (ref 0–2)
SODIUM SERPL-SCNC: 138 MMOL/L (ref 132–146)
SP GR UR STRIP: 1.02 (ref 1–1.03)
TEAR DROP CELLS: ABNORMAL
UROBILINOGEN UR STRIP-ACNC: 2 E.U./DL
WBC # BLD: 9.9 E9/L (ref 4.5–11.5)
WBC #/AREA URNS HPF: ABNORMAL /HPF (ref 0–5)

## 2023-04-01 PROCEDURE — 2580000003 HC RX 258: Performed by: NURSE PRACTITIONER

## 2023-04-01 PROCEDURE — 6360000002 HC RX W HCPCS: Performed by: INTERNAL MEDICINE

## 2023-04-01 PROCEDURE — 6360000002 HC RX W HCPCS: Performed by: NURSE PRACTITIONER

## 2023-04-01 PROCEDURE — 80053 COMPREHEN METABOLIC PANEL: CPT

## 2023-04-01 PROCEDURE — 36415 COLL VENOUS BLD VENIPUNCTURE: CPT

## 2023-04-01 PROCEDURE — 94660 CPAP INITIATION&MGMT: CPT

## 2023-04-01 PROCEDURE — 83735 ASSAY OF MAGNESIUM: CPT

## 2023-04-01 PROCEDURE — 99222 1ST HOSP IP/OBS MODERATE 55: CPT | Performed by: STUDENT IN AN ORGANIZED HEALTH CARE EDUCATION/TRAINING PROGRAM

## 2023-04-01 PROCEDURE — 2500000003 HC RX 250 WO HCPCS: Performed by: STUDENT IN AN ORGANIZED HEALTH CARE EDUCATION/TRAINING PROGRAM

## 2023-04-01 PROCEDURE — 6370000000 HC RX 637 (ALT 250 FOR IP): Performed by: NURSE PRACTITIONER

## 2023-04-01 PROCEDURE — 85025 COMPLETE CBC W/AUTO DIFF WBC: CPT

## 2023-04-01 PROCEDURE — 94640 AIRWAY INHALATION TREATMENT: CPT

## 2023-04-01 PROCEDURE — 84145 PROCALCITONIN (PCT): CPT

## 2023-04-01 PROCEDURE — 83605 ASSAY OF LACTIC ACID: CPT

## 2023-04-01 PROCEDURE — 93010 ELECTROCARDIOGRAM REPORT: CPT | Performed by: INTERNAL MEDICINE

## 2023-04-01 PROCEDURE — 2060000000 HC ICU INTERMEDIATE R&B

## 2023-04-01 PROCEDURE — 2580000003 HC RX 258: Performed by: STUDENT IN AN ORGANIZED HEALTH CARE EDUCATION/TRAINING PROGRAM

## 2023-04-01 RX ORDER — ENOXAPARIN SODIUM 100 MG/ML
1 INJECTION SUBCUTANEOUS 2 TIMES DAILY
Status: DISCONTINUED | OUTPATIENT
Start: 2023-04-01 | End: 2023-04-02

## 2023-04-01 RX ORDER — ARFORMOTEROL TARTRATE 15 UG/2ML
15 SOLUTION RESPIRATORY (INHALATION) 2 TIMES DAILY
Status: DISCONTINUED | OUTPATIENT
Start: 2023-04-01 | End: 2023-04-04 | Stop reason: HOSPADM

## 2023-04-01 RX ORDER — BUDESONIDE 0.5 MG/2ML
0.5 INHALANT ORAL 2 TIMES DAILY
Status: DISCONTINUED | OUTPATIENT
Start: 2023-04-01 | End: 2023-04-04 | Stop reason: HOSPADM

## 2023-04-01 RX ADMIN — SODIUM CHLORIDE, POTASSIUM CHLORIDE, SODIUM LACTATE AND CALCIUM CHLORIDE: 600; 310; 30; 20 INJECTION, SOLUTION INTRAVENOUS at 11:48

## 2023-04-01 RX ADMIN — ALBUTEROL SULFATE 2.5 MG: 2.5 SOLUTION RESPIRATORY (INHALATION) at 21:25

## 2023-04-01 RX ADMIN — BUDESONIDE 500 MCG: 0.5 SUSPENSION RESPIRATORY (INHALATION) at 21:24

## 2023-04-01 RX ADMIN — DOXYCYCLINE HYCLATE 100 MG: 100 CAPSULE ORAL at 21:16

## 2023-04-01 RX ADMIN — ASPIRIN 81 MG CHEWABLE TABLET 81 MG: 81 TABLET CHEWABLE at 08:42

## 2023-04-01 RX ADMIN — SODIUM CHLORIDE 5 MG/HR: 900 INJECTION, SOLUTION INTRAVENOUS at 16:41

## 2023-04-01 RX ADMIN — ALBUTEROL SULFATE 2.5 MG: 2.5 SOLUTION RESPIRATORY (INHALATION) at 08:07

## 2023-04-01 RX ADMIN — SODIUM CHLORIDE, PRESERVATIVE FREE 10 ML: 5 INJECTION INTRAVENOUS at 08:43

## 2023-04-01 RX ADMIN — ARFORMOTEROL TARTRATE 15 MCG: 15 SOLUTION RESPIRATORY (INHALATION) at 21:24

## 2023-04-01 RX ADMIN — WATER 1000 MG: 1 INJECTION INTRAMUSCULAR; INTRAVENOUS; SUBCUTANEOUS at 16:42

## 2023-04-01 RX ADMIN — ENOXAPARIN SODIUM 40 MG: 100 INJECTION SUBCUTANEOUS at 08:42

## 2023-04-01 RX ADMIN — SODIUM CHLORIDE, PRESERVATIVE FREE 10 ML: 5 INJECTION INTRAVENOUS at 21:16

## 2023-04-01 RX ADMIN — DOXYCYCLINE HYCLATE 100 MG: 100 CAPSULE ORAL at 08:42

## 2023-04-01 RX ADMIN — SODIUM CHLORIDE, POTASSIUM CHLORIDE, SODIUM LACTATE AND CALCIUM CHLORIDE: 600; 310; 30; 20 INJECTION, SOLUTION INTRAVENOUS at 21:23

## 2023-04-01 RX ADMIN — METHYLPREDNISOLONE SODIUM SUCCINATE 40 MG: 40 INJECTION INTRAMUSCULAR; INTRAVENOUS at 13:57

## 2023-04-01 RX ADMIN — METHYLPREDNISOLONE SODIUM SUCCINATE 40 MG: 40 INJECTION INTRAMUSCULAR; INTRAVENOUS at 05:56

## 2023-04-01 RX ADMIN — METHYLPREDNISOLONE SODIUM SUCCINATE 40 MG: 40 INJECTION INTRAMUSCULAR; INTRAVENOUS at 21:16

## 2023-04-01 RX ADMIN — DILTIAZEM HYDROCHLORIDE 10 MG: 5 INJECTION INTRAVENOUS at 00:08

## 2023-04-01 RX ADMIN — METOPROLOL SUCCINATE 25 MG: 25 TABLET, EXTENDED RELEASE ORAL at 08:42

## 2023-04-01 RX ADMIN — ALBUTEROL SULFATE 2.5 MG: 2.5 SOLUTION RESPIRATORY (INHALATION) at 11:46

## 2023-04-01 RX ADMIN — ALBUTEROL SULFATE 2.5 MG: 2.5 SOLUTION RESPIRATORY (INHALATION) at 15:44

## 2023-04-01 RX ADMIN — SODIUM CHLORIDE 5 MG/HR: 900 INJECTION, SOLUTION INTRAVENOUS at 01:53

## 2023-04-01 RX ADMIN — ENOXAPARIN SODIUM 70 MG: 100 INJECTION SUBCUTANEOUS at 21:16

## 2023-04-01 NOTE — ED NOTES
Pt notified of need for urine specimen. Pt to notify staff via call light when they need to void.      Judith Lorenzo RN  03/31/23 2033

## 2023-04-01 NOTE — ED NOTES
Pt requesting to stay in home clothes and not change into hospital gown     Jb Johnston [facility]  03/31/23 1829

## 2023-04-01 NOTE — H&P
neoplastic in etiology. 3. Mildly prominent mediastinal lymph nodes which may be reactive or   metastatic. 4. Hypertrophied left adrenal gland. RECOMMENDATIONS:   Unavailable         XR CHEST PORTABLE   Final Result   Patchy airspace and interstitial opacities in upper and lower lobes could   indicate bilateral pneumonia. EKG:     ASSESSMENT:      Principal Problem:    Acute respiratory failure with hypoxia (HCC)  Active Problems:    Atrial fibrillation with RVR (HCC)    Pneumonia due to infectious organism    WERNER (acute kidney injury) (HCC)    Lactic acidosis    COPD exacerbation (HCC)  Resolved Problems:    * No resolved hospital problems. *      PLAN:  BUN/creatinine 42/1.4, lactic acid 2.4, proBNP 6194, troponin 34 and then 29, and D-dimer 3127. EKG showing atrial fibrillation with RVR. Heart rate 131. Afebrile. CTA negative for pulmonary embolism. Showing   \"Innumerable ill-defined pulmonary nodules, masses and consolidative-appearing opacities which may be infectious, inflammatory or metastatic. Mildly prominent mediastinal lymph nodes. \"  2 L saline bolus, Rocephin, doxycycline, DuoNeb x2, magnesium sulfate 2 g, and Solu-Medrol 125 mg in ED. 1.  Acute respiratory failure with hypoxia-likely multifactorial due to #3, #4, and #8. On room air at baseline. Currently on BiPAP with 50% FiO2. PO2 55.5 earlier on 4 L nasal cannula. Wean oxygen as tolerated. DuoNebs every 4 hours while awake with albuterol as needed. 2.  Septic-heart rate 130s, lactic acid 3.3 and then 2.4, hypoxic, and acute kidney injury. Blood cultures x2 done. 2 L saline given in ED. Followed by IV fluid at 100 an hour. Rocephin and doxycycline started in ED. Monitor for deterioration. 3.  Pneumonia-check urine antigens. Check procalcitonin. Viral versus bacterial versus malignancy. Check sputum culture. 4.  Human metapneumouvirus-contact isolation. Respiratory treatments as needed.   Supportive

## 2023-04-02 LAB
ALBUMIN SERPL-MCNC: 3 G/DL (ref 3.5–5.2)
ALP SERPL-CCNC: 87 U/L (ref 35–104)
ALT SERPL-CCNC: 17 U/L (ref 0–32)
ANION GAP SERPL CALCULATED.3IONS-SCNC: 10 MMOL/L (ref 7–16)
AST SERPL-CCNC: 23 U/L (ref 0–31)
BASOPHILS # BLD: 0 E9/L (ref 0–0.2)
BASOPHILS NFR BLD: 0 % (ref 0–2)
BILIRUB SERPL-MCNC: 0.4 MG/DL (ref 0–1.2)
BUN SERPL-MCNC: 36 MG/DL (ref 6–23)
CALCIUM SERPL-MCNC: 10.6 MG/DL (ref 8.6–10.2)
CHLORIDE SERPL-SCNC: 105 MMOL/L (ref 98–107)
CO2 SERPL-SCNC: 24 MMOL/L (ref 22–29)
CREAT SERPL-MCNC: 0.8 MG/DL (ref 0.5–1)
EOSINOPHIL # BLD: 0 E9/L (ref 0.05–0.5)
EOSINOPHIL NFR BLD: 0 % (ref 0–6)
ERYTHROCYTE [DISTWIDTH] IN BLOOD BY AUTOMATED COUNT: 14.8 FL (ref 11.5–15)
GLUCOSE SERPL-MCNC: 139 MG/DL (ref 74–99)
HCT VFR BLD AUTO: 40.1 % (ref 34–48)
HGB BLD-MCNC: 12.9 G/DL (ref 11.5–15.5)
LYMPHOCYTES # BLD: 0.94 E9/L (ref 1.5–4)
LYMPHOCYTES NFR BLD: 9 % (ref 20–42)
MAGNESIUM SERPL-MCNC: 2 MG/DL (ref 1.6–2.6)
MCH RBC QN AUTO: 31.3 PG (ref 26–35)
MCHC RBC AUTO-ENTMCNC: 32.2 % (ref 32–34.5)
MCV RBC AUTO: 97.3 FL (ref 80–99.9)
METAMYELOCYTES NFR BLD MANUAL: 1 % (ref 0–1)
MONOCYTES # BLD: 0.85 E9/L (ref 0.1–0.95)
MONOCYTES NFR BLD: 9 % (ref 2–12)
MYELOCYTES NFR BLD MANUAL: 2 % (ref 0–0)
NEUTROPHILS # BLD: 7.61 E9/L (ref 1.8–7.3)
NEUTS SEG NFR BLD: 78 % (ref 43–80)
OVALOCYTES: ABNORMAL
PLATELET # BLD AUTO: 191 E9/L (ref 130–450)
PMV BLD AUTO: 10.7 FL (ref 7–12)
POIKILOCYTES: ABNORMAL
POLYCHROMASIA: ABNORMAL
POTASSIUM SERPL-SCNC: 3.9 MMOL/L (ref 3.5–5)
PROT SERPL-MCNC: 7 G/DL (ref 6.4–8.3)
RBC # BLD AUTO: 4.12 E12/L (ref 3.5–5.5)
SODIUM SERPL-SCNC: 139 MMOL/L (ref 132–146)
TARGET CELLS: ABNORMAL
TSH SERPL-MCNC: 0.03 UIU/ML (ref 0.27–4.2)
VARIANT LYMPHS NFR BLD: 1 % (ref 0–4)
WBC # BLD: 9.4 E9/L (ref 4.5–11.5)

## 2023-04-02 PROCEDURE — 99232 SBSQ HOSP IP/OBS MODERATE 35: CPT | Performed by: INTERNAL MEDICINE

## 2023-04-02 PROCEDURE — 2580000003 HC RX 258: Performed by: STUDENT IN AN ORGANIZED HEALTH CARE EDUCATION/TRAINING PROGRAM

## 2023-04-02 PROCEDURE — 2060000000 HC ICU INTERMEDIATE R&B

## 2023-04-02 PROCEDURE — 2500000003 HC RX 250 WO HCPCS: Performed by: STUDENT IN AN ORGANIZED HEALTH CARE EDUCATION/TRAINING PROGRAM

## 2023-04-02 PROCEDURE — 6370000000 HC RX 637 (ALT 250 FOR IP): Performed by: PHYSICIAN ASSISTANT

## 2023-04-02 PROCEDURE — 94660 CPAP INITIATION&MGMT: CPT

## 2023-04-02 PROCEDURE — 86300 IMMUNOASSAY TUMOR CA 15-3: CPT

## 2023-04-02 PROCEDURE — 83735 ASSAY OF MAGNESIUM: CPT

## 2023-04-02 PROCEDURE — 36415 COLL VENOUS BLD VENIPUNCTURE: CPT

## 2023-04-02 PROCEDURE — 6360000002 HC RX W HCPCS: Performed by: NURSE PRACTITIONER

## 2023-04-02 PROCEDURE — APPSS60 APP SPLIT SHARED TIME 46-60 MINUTES: Performed by: PHYSICIAN ASSISTANT

## 2023-04-02 PROCEDURE — 99222 1ST HOSP IP/OBS MODERATE 55: CPT | Performed by: INTERNAL MEDICINE

## 2023-04-02 PROCEDURE — 6370000000 HC RX 637 (ALT 250 FOR IP): Performed by: NURSE PRACTITIONER

## 2023-04-02 PROCEDURE — 6360000002 HC RX W HCPCS: Performed by: INTERNAL MEDICINE

## 2023-04-02 PROCEDURE — 2500000003 HC RX 250 WO HCPCS: Performed by: INTERNAL MEDICINE

## 2023-04-02 PROCEDURE — 85025 COMPLETE CBC W/AUTO DIFF WBC: CPT

## 2023-04-02 PROCEDURE — 94640 AIRWAY INHALATION TREATMENT: CPT

## 2023-04-02 PROCEDURE — 80053 COMPREHEN METABOLIC PANEL: CPT

## 2023-04-02 PROCEDURE — 2700000000 HC OXYGEN THERAPY PER DAY

## 2023-04-02 PROCEDURE — 2580000003 HC RX 258: Performed by: NURSE PRACTITIONER

## 2023-04-02 PROCEDURE — 84443 ASSAY THYROID STIM HORMONE: CPT

## 2023-04-02 RX ORDER — PREDNISONE 20 MG/1
40 TABLET ORAL DAILY
Status: DISCONTINUED | OUTPATIENT
Start: 2023-04-02 | End: 2023-04-04 | Stop reason: HOSPADM

## 2023-04-02 RX ORDER — METHYLPREDNISOLONE SODIUM SUCCINATE 40 MG/ML
40 INJECTION, POWDER, LYOPHILIZED, FOR SOLUTION INTRAMUSCULAR; INTRAVENOUS EVERY 12 HOURS
Status: DISCONTINUED | OUTPATIENT
Start: 2023-04-02 | End: 2023-04-02

## 2023-04-02 RX ORDER — METOPROLOL SUCCINATE 25 MG/1
25 TABLET, EXTENDED RELEASE ORAL 2 TIMES DAILY
Status: DISCONTINUED | OUTPATIENT
Start: 2023-04-02 | End: 2023-04-04 | Stop reason: HOSPADM

## 2023-04-02 RX ADMIN — ARFORMOTEROL TARTRATE 15 MCG: 15 SOLUTION RESPIRATORY (INHALATION) at 19:41

## 2023-04-02 RX ADMIN — METOPROLOL SUCCINATE 25 MG: 25 TABLET, EXTENDED RELEASE ORAL at 09:20

## 2023-04-02 RX ADMIN — ALBUTEROL SULFATE 2.5 MG: 2.5 SOLUTION RESPIRATORY (INHALATION) at 08:30

## 2023-04-02 RX ADMIN — ALBUTEROL SULFATE 2.5 MG: 2.5 SOLUTION RESPIRATORY (INHALATION) at 19:41

## 2023-04-02 RX ADMIN — BUDESONIDE 500 MCG: 0.5 SUSPENSION RESPIRATORY (INHALATION) at 19:41

## 2023-04-02 RX ADMIN — ALBUTEROL SULFATE 2.5 MG: 2.5 SOLUTION RESPIRATORY (INHALATION) at 16:09

## 2023-04-02 RX ADMIN — BUDESONIDE 500 MCG: 0.5 SUSPENSION RESPIRATORY (INHALATION) at 08:30

## 2023-04-02 RX ADMIN — SODIUM CHLORIDE, PRESERVATIVE FREE 10 ML: 5 INJECTION INTRAVENOUS at 10:06

## 2023-04-02 RX ADMIN — ARFORMOTEROL TARTRATE 15 MCG: 15 SOLUTION RESPIRATORY (INHALATION) at 08:30

## 2023-04-02 RX ADMIN — SODIUM CHLORIDE, POTASSIUM CHLORIDE, SODIUM LACTATE AND CALCIUM CHLORIDE: 600; 310; 30; 20 INJECTION, SOLUTION INTRAVENOUS at 20:58

## 2023-04-02 RX ADMIN — ASPIRIN 81 MG CHEWABLE TABLET 81 MG: 81 TABLET CHEWABLE at 09:20

## 2023-04-02 RX ADMIN — ALBUTEROL SULFATE 2.5 MG: 2.5 SOLUTION RESPIRATORY (INHALATION) at 12:31

## 2023-04-02 RX ADMIN — WATER 1000 MG: 1 INJECTION INTRAMUSCULAR; INTRAVENOUS; SUBCUTANEOUS at 16:59

## 2023-04-02 RX ADMIN — ACETAMINOPHEN 650 MG: 325 TABLET ORAL at 04:22

## 2023-04-02 RX ADMIN — DOXYCYCLINE HYCLATE 100 MG: 100 CAPSULE ORAL at 20:51

## 2023-04-02 RX ADMIN — METHYLPREDNISOLONE SODIUM SUCCINATE 40 MG: 40 INJECTION INTRAMUSCULAR; INTRAVENOUS at 06:26

## 2023-04-02 RX ADMIN — DOXYCYCLINE HYCLATE 100 MG: 100 CAPSULE ORAL at 09:20

## 2023-04-02 RX ADMIN — SODIUM CHLORIDE, POTASSIUM CHLORIDE, SODIUM LACTATE AND CALCIUM CHLORIDE: 600; 310; 30; 20 INJECTION, SOLUTION INTRAVENOUS at 09:19

## 2023-04-02 RX ADMIN — SODIUM CHLORIDE 7.5 MG/HR: 900 INJECTION, SOLUTION INTRAVENOUS at 09:20

## 2023-04-02 RX ADMIN — MICONAZOLE NITRATE: 20 POWDER TOPICAL at 19:30

## 2023-04-02 RX ADMIN — APIXABAN 5 MG: 5 TABLET, FILM COATED ORAL at 20:51

## 2023-04-02 RX ADMIN — METOPROLOL SUCCINATE 25 MG: 25 TABLET, EXTENDED RELEASE ORAL at 20:51

## 2023-04-02 RX ADMIN — MICONAZOLE NITRATE: 20 POWDER TOPICAL at 12:34

## 2023-04-02 RX ADMIN — ENOXAPARIN SODIUM 70 MG: 100 INJECTION SUBCUTANEOUS at 09:20

## 2023-04-02 ASSESSMENT — PAIN DESCRIPTION - DESCRIPTORS: DESCRIPTORS: SORE

## 2023-04-02 ASSESSMENT — PAIN SCALES - GENERAL
PAINLEVEL_OUTOF10: 3
PAINLEVEL_OUTOF10: 0
PAINLEVEL_OUTOF10: 0

## 2023-04-02 ASSESSMENT — PAIN - FUNCTIONAL ASSESSMENT: PAIN_FUNCTIONAL_ASSESSMENT: ACTIVITIES ARE NOT PREVENTED

## 2023-04-02 ASSESSMENT — PAIN DESCRIPTION - ORIENTATION: ORIENTATION: RIGHT

## 2023-04-02 ASSESSMENT — PAIN DESCRIPTION - LOCATION: LOCATION: WRIST

## 2023-04-03 ENCOUNTER — APPOINTMENT (OUTPATIENT)
Dept: GENERAL RADIOLOGY | Age: 83
DRG: 193 | End: 2023-04-03
Payer: MEDICARE

## 2023-04-03 LAB
ALBUMIN SERPL-MCNC: 2.4 G/DL (ref 3.5–5.2)
ALP SERPL-CCNC: 55 U/L (ref 35–104)
ALT SERPL-CCNC: 24 U/L (ref 0–32)
ANION GAP SERPL CALCULATED.3IONS-SCNC: 8 MMOL/L (ref 7–16)
AST SERPL-CCNC: 33 U/L (ref 0–31)
BACTERIA UR CULT: NORMAL
BASOPHILS # BLD: 0 E9/L (ref 0–0.2)
BASOPHILS NFR BLD: 0 % (ref 0–2)
BILIRUB SERPL-MCNC: <0.2 MG/DL (ref 0–1.2)
BUN SERPL-MCNC: 37 MG/DL (ref 6–23)
CALCIUM SERPL-MCNC: 9.9 MG/DL (ref 8.6–10.2)
CANCER AG15-3 SERPL-ACNC: 26 U/ML (ref 0–31)
CANCER AG27-29 SERPL-ACNC: 33.5 U/ML
CHLORIDE SERPL-SCNC: 106 MMOL/L (ref 98–107)
CO2 SERPL-SCNC: 25 MMOL/L (ref 22–29)
CREAT SERPL-MCNC: 0.6 MG/DL (ref 0.5–1)
DOHLE BODIES: ABNORMAL
EOSINOPHIL # BLD: 0 E9/L (ref 0.05–0.5)
EOSINOPHIL NFR BLD: 0 % (ref 0–6)
ERYTHROCYTE [DISTWIDTH] IN BLOOD BY AUTOMATED COUNT: 15 FL (ref 11.5–15)
GLUCOSE SERPL-MCNC: 135 MG/DL (ref 74–99)
HCT VFR BLD AUTO: 40.2 % (ref 34–48)
HGB BLD-MCNC: 13.1 G/DL (ref 11.5–15.5)
HYPOCHROMIA: ABNORMAL
LV EF: 60 %
LVEF MODALITY: NORMAL
LYMPHOCYTES # BLD: 0.5 E9/L (ref 1.5–4)
LYMPHOCYTES NFR BLD: 4.3 % (ref 20–42)
MCH RBC QN AUTO: 31.7 PG (ref 26–35)
MCHC RBC AUTO-ENTMCNC: 32.6 % (ref 32–34.5)
MCV RBC AUTO: 97.3 FL (ref 80–99.9)
METAMYELOCYTES NFR BLD MANUAL: 4.3 % (ref 0–1)
MONOCYTES # BLD: 0.99 E9/L (ref 0.1–0.95)
MONOCYTES NFR BLD: 9.5 % (ref 2–12)
MYELOCYTES NFR BLD MANUAL: 4.3 % (ref 0–0)
NEUTROPHILS # BLD: 8.22 E9/L (ref 1.8–7.3)
NEUTS SEG NFR BLD: 74.1 % (ref 43–80)
NRBC BLD-RTO: 0 /100 WBC
PLATELET # BLD AUTO: 234 E9/L (ref 130–450)
PMV BLD AUTO: 10.8 FL (ref 7–12)
POIKILOCYTES: ABNORMAL
POLYCHROMASIA: ABNORMAL
POTASSIUM SERPL-SCNC: 4.5 MMOL/L (ref 3.5–5)
PROMYELOCYTES NFR BLD MANUAL: 2.6 % (ref 0–0)
PROT SERPL-MCNC: 5.5 G/DL (ref 6.4–8.3)
RBC # BLD AUTO: 4.13 E12/L (ref 3.5–5.5)
REASON FOR REJECTION: NORMAL
REJECTED TEST: NORMAL
SODIUM SERPL-SCNC: 139 MMOL/L (ref 132–146)
T3FREE SERPL-MCNC: 1.3 PG/ML (ref 2–4.4)
T4 FREE SERPL-MCNC: 0.9 NG/DL (ref 0.93–1.7)
TARGET CELLS: ABNORMAL
TOXIC GRANULATION: ABNORMAL
VACUOLATED NEUTROPHILS: ABNORMAL
VARIANT LYMPHS NFR BLD: 0.9 % (ref 0–4)
WBC # BLD: 9.9 E9/L (ref 4.5–11.5)

## 2023-04-03 PROCEDURE — 2060000000 HC ICU INTERMEDIATE R&B

## 2023-04-03 PROCEDURE — 6370000000 HC RX 637 (ALT 250 FOR IP): Performed by: NURSE PRACTITIONER

## 2023-04-03 PROCEDURE — 36415 COLL VENOUS BLD VENIPUNCTURE: CPT

## 2023-04-03 PROCEDURE — 87449 NOS EACH ORGANISM AG IA: CPT

## 2023-04-03 PROCEDURE — 93306 TTE W/DOPPLER COMPLETE: CPT

## 2023-04-03 PROCEDURE — 71045 X-RAY EXAM CHEST 1 VIEW: CPT

## 2023-04-03 PROCEDURE — 84439 ASSAY OF FREE THYROXINE: CPT

## 2023-04-03 PROCEDURE — 84481 FREE ASSAY (FT-3): CPT

## 2023-04-03 PROCEDURE — 94640 AIRWAY INHALATION TREATMENT: CPT

## 2023-04-03 PROCEDURE — 99221 1ST HOSP IP/OBS SF/LOW 40: CPT | Performed by: INTERNAL MEDICINE

## 2023-04-03 PROCEDURE — 6370000000 HC RX 637 (ALT 250 FOR IP): Performed by: PHYSICIAN ASSISTANT

## 2023-04-03 PROCEDURE — 80053 COMPREHEN METABOLIC PANEL: CPT

## 2023-04-03 PROCEDURE — 2700000000 HC OXYGEN THERAPY PER DAY

## 2023-04-03 PROCEDURE — 6370000000 HC RX 637 (ALT 250 FOR IP)

## 2023-04-03 PROCEDURE — 2500000003 HC RX 250 WO HCPCS: Performed by: INTERNAL MEDICINE

## 2023-04-03 PROCEDURE — 6360000002 HC RX W HCPCS: Performed by: NURSE PRACTITIONER

## 2023-04-03 PROCEDURE — 2580000003 HC RX 258: Performed by: STUDENT IN AN ORGANIZED HEALTH CARE EDUCATION/TRAINING PROGRAM

## 2023-04-03 PROCEDURE — 94660 CPAP INITIATION&MGMT: CPT

## 2023-04-03 PROCEDURE — 85025 COMPLETE CBC W/AUTO DIFF WBC: CPT

## 2023-04-03 PROCEDURE — 6360000002 HC RX W HCPCS: Performed by: INTERNAL MEDICINE

## 2023-04-03 PROCEDURE — 2580000003 HC RX 258: Performed by: NURSE PRACTITIONER

## 2023-04-03 PROCEDURE — 94669 MECHANICAL CHEST WALL OSCILL: CPT

## 2023-04-03 RX ORDER — LEVALBUTEROL INHALATION SOLUTION 0.63 MG/3ML
0.63 SOLUTION RESPIRATORY (INHALATION) EVERY 6 HOURS
Status: DISCONTINUED | OUTPATIENT
Start: 2023-04-03 | End: 2023-04-04 | Stop reason: HOSPADM

## 2023-04-03 RX ADMIN — SODIUM CHLORIDE, POTASSIUM CHLORIDE, SODIUM LACTATE AND CALCIUM CHLORIDE: 600; 310; 30; 20 INJECTION, SOLUTION INTRAVENOUS at 06:47

## 2023-04-03 RX ADMIN — SODIUM CHLORIDE, PRESERVATIVE FREE 10 ML: 5 INJECTION INTRAVENOUS at 20:38

## 2023-04-03 RX ADMIN — SODIUM CHLORIDE, PRESERVATIVE FREE 5 ML: 5 INJECTION INTRAVENOUS at 09:32

## 2023-04-03 RX ADMIN — BUDESONIDE 500 MCG: 0.5 SUSPENSION RESPIRATORY (INHALATION) at 20:11

## 2023-04-03 RX ADMIN — LEVALBUTEROL 0.63 MG: 0.63 SOLUTION RESPIRATORY (INHALATION) at 20:11

## 2023-04-03 RX ADMIN — WATER 1000 MG: 1 INJECTION INTRAMUSCULAR; INTRAVENOUS; SUBCUTANEOUS at 18:09

## 2023-04-03 RX ADMIN — MICONAZOLE NITRATE: 20 POWDER TOPICAL at 08:31

## 2023-04-03 RX ADMIN — DOXYCYCLINE HYCLATE 100 MG: 100 CAPSULE ORAL at 08:30

## 2023-04-03 RX ADMIN — METOPROLOL SUCCINATE 25 MG: 25 TABLET, EXTENDED RELEASE ORAL at 20:38

## 2023-04-03 RX ADMIN — APIXABAN 5 MG: 5 TABLET, FILM COATED ORAL at 20:38

## 2023-04-03 RX ADMIN — ARFORMOTEROL TARTRATE 15 MCG: 15 SOLUTION RESPIRATORY (INHALATION) at 20:11

## 2023-04-03 RX ADMIN — ARFORMOTEROL TARTRATE 15 MCG: 15 SOLUTION RESPIRATORY (INHALATION) at 08:55

## 2023-04-03 RX ADMIN — METOPROLOL SUCCINATE 25 MG: 25 TABLET, EXTENDED RELEASE ORAL at 08:30

## 2023-04-03 RX ADMIN — LEVALBUTEROL 0.63 MG: 0.63 SOLUTION RESPIRATORY (INHALATION) at 13:31

## 2023-04-03 RX ADMIN — ALBUTEROL SULFATE 2.5 MG: 2.5 SOLUTION RESPIRATORY (INHALATION) at 08:54

## 2023-04-03 RX ADMIN — DOXYCYCLINE HYCLATE 100 MG: 100 CAPSULE ORAL at 20:38

## 2023-04-03 RX ADMIN — APIXABAN 5 MG: 5 TABLET, FILM COATED ORAL at 08:30

## 2023-04-03 RX ADMIN — PREDNISONE 40 MG: 20 TABLET ORAL at 08:30

## 2023-04-03 RX ADMIN — BUDESONIDE 500 MCG: 0.5 SUSPENSION RESPIRATORY (INHALATION) at 08:55

## 2023-04-03 ASSESSMENT — PAIN SCALES - GENERAL: PAINLEVEL_OUTOF10: 0

## 2023-04-03 NOTE — ACP (ADVANCE CARE PLANNING)
Advance Care Planning   Healthcare Decision Maker:    Primary Decision Maker: Lorena Green - [phone]    Click here to complete Healthcare Decision Makers including selection of the Healthcare Decision Maker Relationship (ie \"Primary\").

## 2023-04-03 NOTE — CONSULTS
PULMONARY CONSULTATION    Reason for Consultation: pneumonia/copd  Referring Physician: Nedra Kerr DO    Communication with the referring physician will be sent via the electronic medical record. HPI:    The patient is an 82year old female with a history of breast cancer and atrial fibrillation who presented with increased coughing, fatigue, weakness after returning home this week from a trip to [place]. She has a moist cough but is unable to expectorate. She has noticed wheezing and SOB. She has no h/o underlying COPD although does smoke 1/2 PPD and has smoked for nearly 60 years. She tested + for human metapneumovirus. CT with diffuse nodular disease, confluent airspace disease, ad mediastinal adenopathy. Past Medical History:   Diagnosis Date    Breast cancer ([facility] [address]     Heart palpitations     a fib       Past Surgical History:   Procedure Laterality Date    KNEE SURGERY         History reviewed. No pertinent family history.     Social History     Socioeconomic History    Marital status:      Spouse name: Not on file    Number of children: Not on file    Years of education: Not on file    Highest education level: Not on file   Occupational History    Not on file   Tobacco Use    Smoking status: Every Day     Packs/day: 1.00     Types: Cigarettes    Smokeless tobacco: Not on file    Tobacco comments:     1/2 pack   Substance and Sexual Activity    Alcohol use: No     Alcohol/week: 0.0 standard drinks    Drug use: No    Sexual activity: Not on file   Other Topics Concern    Not on file   Social History Narrative    Not on file     Social Determinants of Health     Financial Resource Strain: Not on file   Food Insecurity: Not on file   Transportation Needs: Not on file   Physical Activity: Not on file   Stress: Not on file   Social Connections: Not on file   Intimate Partner Violence: Not on file   Housing Stability: Not on file       Current Facility-Administered Medications   Medication
Value Date/Time    LABA1C 5.7 11/15/2022 01:51 PM    GLUCOSE 135 04/03/2023 05:52 AM     Lab Results   Component Value Date/Time    TRIG 142 11/15/2022 01:51 PM    HDL 53 11/15/2022 01:51 PM    LDLCALC 197 11/15/2022 01:51 PM    CHOL 278 11/15/2022 01:51 PM       Blood culture   Lab Results   Component Value Date/Time    BC 24 Hours no growth 03/31/2023 06:11 PM    BC 24 Hours no growth 03/31/2023 05:01 PM       Radiology:  XR CHEST PORTABLE   Final Result   Bilateral infiltrates most pronounced in the upper lobes. There is some   partial resolution in the left upper lobe since the prior study         CTA PULMONARY W CONTRAST   Final Result   1. No pulmonary embolism. 2. Innumerable ill-defined pulmonary nodules, masses and   consolidative-appearing opacities which may be infectious, inflammatory or   neoplastic in etiology. 3. Mildly prominent mediastinal lymph nodes which may be reactive or   metastatic. 4. Hypertrophied left adrenal gland. RECOMMENDATIONS:   Unavailable         XR CHEST PORTABLE   Final Result   Patchy airspace and interstitial opacities in upper and lower lobes could   indicate bilateral pneumonia. Medical Records/Labs/Images Review:   I personally reviewed and summarized previous records   All labs and imaging were reviewed independently    [address], a 82 y.o.-old female seen in for management of evaluation of abnormal thyroid function     Abnormal thyroid function test  Labs showed Low TSH with low FT4 and FT3  Current abnormal TFT is likely from being on steroids.    Euthyroid sick syndrome might also playing a role   Will check rT3   We don't recommend any thyroid hormones replacement at this time   We recommend recheck thyroid panel 5-6 weeks after discharge       Acute hypoxic respiratory failure   Per primary and pulmonary service     Interdisciplinary plan for communication with healthcare providers:   Consult
consolidative-appearing opacities which may be infectious, inflammatory or   neoplastic in etiology. 3. Mildly prominent mediastinal lymph nodes which may be reactive or   metastatic. 4. Hypertrophied left adrenal gland. RECOMMENDATIONS:   Unavailable         XR CHEST PORTABLE   Final Result   Patchy airspace and interstitial opacities in upper and lower lobes could   indicate bilateral pneumonia. ASSESSMENT/PLAN :    82years old female with history of left breast CVA stage II, hormone positive HER2 positive status post lumpectomy axilla lymph node dissection followed by mastectomy for positive margins, s/p adjuvant radiation therapy, TCH, anastrozole. Admitted with hypoxic respiratory failure, positive for human metapneumovirus. CT chest showed nodules in lungs with mild mediastinal lymphadenopathy. Lung nodules and groundglass opacities likely related to underlying bilateral infection. Mild mediastinal lymphadenopathy likely reactive. With recent mammogram in August 2022 was negative for malignancy. No evidence of recurrence on mastectomy scar. We will get tumor markers. We will repeat a CT scan at 4 to 6 weeks. Cardiology consulted for rapid A-fib. Started on Lovenox atrial fibrillation. We will continue to follow. Thank you for the consult.       Electronically signed by Jose Francisco Aceves MD on 4/1/2023 at 8:43 PM
97%   BMI 26.55 kg/m²   Appearance: Awake, alert, no acute respiratory distress  Skin: Intact, no rash  Head: Normocephalic, atraumatic  ENMT: MMM, no rhinorrhea  Neck: Supple, no carotid bruits  Lungs: Clear to auscultation bilaterally. No wheezes, rales, or rhonchi. Cardiac: Regular rate and rhythm, +S1S2, no murmurs apparent  Abdomen: Soft, +bowel sounds  Extremities: Moves all extremities x 4, no lower extremity edema  Neurologic: No focal motor deficits apparent, normal mood and affect        Assessment  A-fib with RVR  PAF, not on OAC  Borderline troponin leak  Vertigo, on PRN Meclizine  Elevated BNP in the setting of fever and metapneumovirus/respiratory failure  Human meta pneumovirus  Acute respiratory failure  Low TSH  Hypokalemia   Fever  Cough  COPD with continued tobacco abuse  L Breast CA s/p L mastectomy (2015), chemotherapy (completed 2015) and XRT (completed 2016)    Plan:   Regarding A-fib with RVR, continue on metoprolol succinate and uptitrate for optimal rate control  Wean off diltiazem and uptitrate beta-blocker for optimal rate control  Eliquis is initiated for anticoagulation  Awaiting echocardiogram to guide therapy  Once infectious etiology and metapneumovirus resolved consider Lexiscan myocardial perfusion stress test for further evaluation  Elevated BNP likely due to infectious etiology, continue to monitor closely  If echocardiogram shows low EF please call cardiology back to discuss guideline directed medical therapy and further evaluation. Cardiology will sign off. Please call with questions.           Lizbeth Kinney MD  [facility]) Cardiology

## 2023-04-04 VITALS
SYSTOLIC BLOOD PRESSURE: 135 MMHG | OXYGEN SATURATION: 92 % | HEIGHT: 68 IN | WEIGHT: 185 LBS | DIASTOLIC BLOOD PRESSURE: 71 MMHG | RESPIRATION RATE: 18 BRPM | BODY MASS INDEX: 28.04 KG/M2 | HEART RATE: 74 BPM | TEMPERATURE: 97.7 F

## 2023-04-04 LAB
LEGIONELLA AG UR QL: NORMAL
S PNEUM AG SPEC QL: NORMAL

## 2023-04-04 PROCEDURE — 2700000000 HC OXYGEN THERAPY PER DAY

## 2023-04-04 PROCEDURE — 97161 PT EVAL LOW COMPLEX 20 MIN: CPT

## 2023-04-04 PROCEDURE — 84482 T3 REVERSE: CPT

## 2023-04-04 PROCEDURE — 6360000002 HC RX W HCPCS: Performed by: INTERNAL MEDICINE

## 2023-04-04 PROCEDURE — 94640 AIRWAY INHALATION TREATMENT: CPT

## 2023-04-04 PROCEDURE — 6370000000 HC RX 637 (ALT 250 FOR IP): Performed by: NURSE PRACTITIONER

## 2023-04-04 PROCEDURE — 6360000002 HC RX W HCPCS: Performed by: NURSE PRACTITIONER

## 2023-04-04 PROCEDURE — 97535 SELF CARE MNGMENT TRAINING: CPT

## 2023-04-04 PROCEDURE — 97165 OT EVAL LOW COMPLEX 30 MIN: CPT

## 2023-04-04 PROCEDURE — 94660 CPAP INITIATION&MGMT: CPT

## 2023-04-04 PROCEDURE — 6370000000 HC RX 637 (ALT 250 FOR IP): Performed by: PHYSICIAN ASSISTANT

## 2023-04-04 PROCEDURE — 2580000003 HC RX 258: Performed by: NURSE PRACTITIONER

## 2023-04-04 RX ORDER — FLUTICASONE FUROATE, UMECLIDINIUM BROMIDE AND VILANTEROL TRIFENATATE 100; 62.5; 25 UG/1; UG/1; UG/1
1 POWDER RESPIRATORY (INHALATION) DAILY
Qty: 1 EACH | Refills: 3 | Status: SHIPPED | OUTPATIENT
Start: 2023-04-04

## 2023-04-04 RX ORDER — MECLIZINE HYDROCHLORIDE 25 MG/1
25 TABLET ORAL 3 TIMES DAILY PRN
Qty: 30 TABLET | Refills: 0 | Status: SHIPPED | OUTPATIENT
Start: 2023-04-04 | End: 2023-04-14

## 2023-04-04 RX ORDER — PREDNISONE 20 MG/1
40 TABLET ORAL DAILY
Qty: 6 TABLET | Refills: 0 | Status: SHIPPED | OUTPATIENT
Start: 2023-04-05 | End: 2023-04-08

## 2023-04-04 RX ORDER — DOXYCYCLINE HYCLATE 100 MG
100 TABLET ORAL 2 TIMES DAILY
Qty: 14 TABLET | Refills: 0 | Status: SHIPPED | OUTPATIENT
Start: 2023-04-04 | End: 2023-04-11

## 2023-04-04 RX ORDER — ALBUTEROL SULFATE 2.5 MG/3ML
2.5 SOLUTION RESPIRATORY (INHALATION) EVERY 4 HOURS PRN
Qty: 120 EACH | Refills: 3 | Status: SHIPPED | OUTPATIENT
Start: 2023-04-04

## 2023-04-04 RX ORDER — METOPROLOL SUCCINATE 25 MG/1
25 TABLET, EXTENDED RELEASE ORAL 2 TIMES DAILY
Qty: 30 TABLET | Refills: 3 | Status: SHIPPED | OUTPATIENT
Start: 2023-04-04

## 2023-04-04 RX ORDER — CEFUROXIME AXETIL 500 MG/1
500 TABLET ORAL 2 TIMES DAILY
Qty: 14 TABLET | Refills: 0 | Status: SHIPPED | OUTPATIENT
Start: 2023-04-04 | End: 2023-04-11

## 2023-04-04 RX ADMIN — DOXYCYCLINE HYCLATE 100 MG: 100 CAPSULE ORAL at 08:01

## 2023-04-04 RX ADMIN — ARFORMOTEROL TARTRATE 15 MCG: 15 SOLUTION RESPIRATORY (INHALATION) at 08:57

## 2023-04-04 RX ADMIN — APIXABAN 5 MG: 5 TABLET, FILM COATED ORAL at 08:00

## 2023-04-04 RX ADMIN — MICONAZOLE NITRATE: 20 POWDER TOPICAL at 08:09

## 2023-04-04 RX ADMIN — LEVALBUTEROL 0.63 MG: 0.63 SOLUTION RESPIRATORY (INHALATION) at 01:02

## 2023-04-04 RX ADMIN — SODIUM CHLORIDE, PRESERVATIVE FREE 10 ML: 5 INJECTION INTRAVENOUS at 08:01

## 2023-04-04 RX ADMIN — PREDNISONE 40 MG: 20 TABLET ORAL at 08:01

## 2023-04-04 RX ADMIN — LEVALBUTEROL 0.63 MG: 0.63 SOLUTION RESPIRATORY (INHALATION) at 13:18

## 2023-04-04 RX ADMIN — BUDESONIDE 500 MCG: 0.5 SUSPENSION RESPIRATORY (INHALATION) at 08:57

## 2023-04-04 RX ADMIN — LEVALBUTEROL 0.63 MG: 0.63 SOLUTION RESPIRATORY (INHALATION) at 08:57

## 2023-04-04 RX ADMIN — METOPROLOL SUCCINATE 25 MG: 25 TABLET, EXTENDED RELEASE ORAL at 08:01

## 2023-04-04 NOTE — PLAN OF CARE
Problem: Discharge Planning  Goal: Discharge to home or other facility with appropriate resources  4/4/2023 0055 by [place] Drew RN  Outcome: Progressing  4/4/2023 0055 by [place] Drew RN  Outcome: Progressing     Problem: Safety - Adult  Goal: Free from fall injury  4/4/2023 0055 by [place] Drew RN  Outcome: Progressing  4/4/2023 0055 by [place] Drew RN  Outcome: Progressing

## 2023-04-04 NOTE — CARE COORDINATION
Social Work:    Advised by unit of expected discharge tomorrow rather than today. Notified Simeon Lea at [facility]o at [facility].     Electronically signed by ROBERTO Diane on 4/4/2023 at 2:36 PM
Social Work:    Assisted with medical POA forms. Copy in chart. Original given to patient.     Electronically signed by ROBERTO Mcintyre on 4/4/2023 at 3:58 PM
Social Work:    Follow-up visit was made with Mrs. Bart Higuera this a.m. Social work discussed [address] vs SNF & home DME. Mrs. Bart Higuera prefers return home with [address] & DME and will have assistance from her granddaughter as needed. Social work called a tentative referral in to [facility] for home 02, a wheeled walker, and possible nebulizer. (Need orders) Social work also called a referral to [facility] for nursing and therapy.   (Need orders)    Electronically signed by ROBERTO Hopper on 4/4/2023 at 1:58 PM
Social Work:    Social work now advised of discharge home today. Social service notified both [address].     Electronically signed by ROBERTO Mcintyre on 4/4/2023 at 3:23 PM
Plan: Hakeem Johnson ESSENTIAL/PLUS / Product Type: *No Product type* /     Does insurance require precert for SNF: Yes    Potential assistance Purchasing Medications:    Meds-to-Beds request:        [facility] - Alirio Osborne, [address]  [address] [address]  Phone: [phone] Fax: [phone]      Notes:    Factors facilitating achievement of predicted outcomes: Family support, Cooperative, Pleasant, Sense of humor, and Good insight into deficits    Barriers to discharge: lives alone  The Plan for Transition of Care is related to the following treatment goals of Hypercalcemia [E83.52]  Persistent atrial fibrillation ([address] [I48.19]  WERNER (acute kidney injury) ([address] [N17.9]  Acute respiratory failure with hypoxia ([address] [J96.01]  Elevated lactic acid level [R79.89]  Pneumonia of both lungs due to infectious organism, unspecified part of lung [Q06.8]    IF APPLICABLE: The Patient and/or patient representative Megan Gilbert and her family were provided with a choice of provider and agrees with the discharge plan. Freedom of choice list with basic dialogue that supports the patient's individualized plan of care/goals and shares the quality data associated with the providers was provided to: Patient   Patient Representative Name: n/a    The Patient and/or Patient Representative Agree with the Discharge Plan?  Yes    Siddharth Her [facility]  Case Management Department  Ph: [phone] Fax: [phone]

## 2023-04-05 LAB
BACTERIA BLD CULT: NORMAL
BACTERIA BLD CULT: NORMAL

## 2023-04-06 NOTE — PROGRESS NOTES
04/02/23 0018   NIV Type   $NIV $Daily Charge   Mode (S)  Bilevel  (refuses use , standby)
24 hour chart check complete.  Elvi Don, TOMAS
24 hour chart check complete.  Jo Ann Bautista RN
Brief internal medicine progress note:    Patient seen, examined, H&P and billing done on this calendar day. I also saw and examined patient. Patient on 5 mg Cardizem drip and heart rate around 100 she is still in A-fib. A-fib seems to be a chronic diagnosis for her but patient is not on any anticoagulation. I am not sure why. We will start Lovenox at this time in case any biopsies or other procedures are to be planned, and afterwards can start [address] Cardiology consult to be placed. Patient does not seem to believe she has ever seen a cardiologist before. Patient with history of breast cancer and has multiple nodular masses and consolidative appearing opacities. She has been seen by pulmonary who feels these are likely infectious and right now is continuing antibiotics. Patient definitely with bronchospasm at this time and is on steroids and nebulizers.     Elmer Ching MD
Brown Carroll M.D.,[facility]  Lovely Rubalcava D.O., F.A.C.O.I., Eugene Lang M.D. Tejas Hopkins M.D. Marianne Hylton D.O. Daily Pulmonary Progress Note    Patient:  Aggie Christianson 82 y.o. female MRN: [id number]     Date of Service: 4/3/2023      Synopsis     We are following patient for bacterial pneumonia, COPD, respiratory failure    \"CC\" SOB    Code status: Full      Subjective      Patient was seen and examined sitting up in bed on 2 liters NC oxygen. Has not been wearing bipap at HS as ordered. She states she feel just a little bit better. Less cough and dyspnea. Switched to oral steroids. 2 D echo completed at bedside. IV fluids per primary. Off cardizem infusion. Free T 4 0.90, TSH 0.031. Review of Systems:  Constitutional: Denies fever, weight loss, night sweats, and fatigue  Skin: Denies pigmentation, dark lesions, and rashes   HEENT: Denies hearing loss, tinnitus, ear drainage, epistaxis, sore throat, and hoarseness. Cardiovascular: Denies palpitations, chest pain, and chest pressure.   Respiratory: + SOB, improved aeration and wheezing  Gastrointestinal: Denies nausea, vomiting, poor appetite, diarrhea, heartburn or reflux  Genitourinary: Denies dysuria, frequency, urgency or hematuria  Musculoskeletal: Denies myalgias, muscle weakness, and bone pain  Neurological: Denies dizziness, vertigo, headache, and focal weakness  Psychological: Denies anxiety and depression  Endocrine: Denies heat intolerance and cold intolerance  Hematopoietic/Lymphatic: Denies bleeding problems and blood transfusions    24-hour events:  Echo completed    Objective   Vitals: BP (!) 147/78   Pulse 74   Temp 98.7 °F (37.1 °C) (Oral)   Resp 18   Ht 5' 8\" (1.727 m)   Wt 179 lb 11.2 oz (81.5 kg)   SpO2 93%   BMI 27.32 kg/m²     I/O:    Intake/Output Summary (Last 24 hours) at 4/3/2023 1245  Last data filed at 4/3/2023 0600  Gross per 24 hour   Intake 1100 ml   Output --   Net 1100 ml
CLINICAL PHARMACY NOTE: MEDS TO BEDS    Total # of Prescriptions Filled: 3   The following medications were delivered to the patient:  Antifungal 2% powder   Eliquis 5mg  Meclizine 25 mg     Additional Documentation:  Delivered to patient --CP
Consult messaged to Dr. Arabella Patel
Consults placed to hematology and pulmonary.   Analy Yun RN
Date: 3/31/2023    Time: 9:06 PM    Patient Placed On BIPAP/CPAP/ Non-Invasive Ventilation? No, pt remains on bipap    If no must comment. Facial area red/color change? No           If YES are Blister/Lesion present? No   If yes must notify nursing staff  BIPAP/CPAP skin barrier?   Yes    Skin barrier type:mepilexlite       Comments:        Brian Puckett RCP
Date: 4/3/2023    Time: 8:53 PM    Patient Placed On BIPAP/CPAP/ Non-Invasive Ventilation? No    If no must comment. Facial area red/color change? No           If YES are Blister/Lesion present? No   If yes must notify nursing staff  BIPAP/CPAP skin barrier? No    Skin barrier type: na        Comments: Pt continues to refuse her BiPAP. Machine remains in room if pt changes her mind.         Onel Cain RCP
Marin Gil notified of patient having hallucinations seeing pictures moving on walls,\"splotches\" in the hallway, and letters moving down from the ceiling on the wall. Patient states \"I'm ok now, they stopped. \" Neurological assessment performed at time of hallucinations and no abnormalities noted, patient is A&Ox4, VS stable. Continue to monitor patient.
New consult messaged to [place]
PULSE OX ON ROOM AIR RESTING 92%  PULSE OX ON ROOM IR AMBULATING 85%  PULSE OX ON 2 LITERS AMBULATING RECOVERY 92%
PULSE OX ON ROOM AIR resting 87%  PULSE OX ON ROOM AIR AMBULATING 86%  PULSE OX ON 2 LITERS SITTING RECOVERY 94%  PULSE OX ON 2 LITERS AMBULATING RECOVERY  94%
Patient's granddaughter would like to note her work extension is [id number], and she will be known at work if called there by her last name Joelle Patel.
Physical Therapy  Facility/Department: [address] INTERNAL MEDICINE 2  Physical Therapy Initial Assessment    Name: Marquis Grey  : 1940  MRN: [id number]  Date of Service: 2023      Patient Diagnosis(es): The primary encounter diagnosis was Pneumonia of both lungs due to infectious organism, unspecified part of lung. Diagnoses of WERNER (acute kidney injury) ([address] Hypercalcemia, Acute respiratory failure with hypoxia ([address] Elevated lactic acid level, and Persistent atrial fibrillation ([address] were also pertinent to this visit. Past Medical History:  has a past medical history of Breast cancer ([address] and Heart palpitations. Past Surgical History:  has a past surgical history that includes knee surgery. Evaluating Therapist: [facility]    Room #:  7876/9767-S  Diagnosis:  Hypercalcemia [E83.52]  Persistent atrial fibrillation ([address] [I48.19]  WERNER (acute kidney injury) ([address] [N17.9]  Acute respiratory failure with hypoxia (HCC) [J96.01]  Elevated lactic acid level [R79.89]  Pneumonia of both lungs due to infectious organism, unspecified part of lung [J18.9]  PMHx/PSHx:  Breast CA  Precautions:  fall, alarm, contact isolation, O2      Social:  Pt lives with alone in a 1 floor plan ambulates with cane. Laundry is in basement and pt uses a walker in the basement. Initial Evaluation  Date: 23 Treatment      Short Term/ Long Term   Goals   Was pt agreeable to Eval/treatment? yes     Does pt have pain?  No c/o pain     Bed Mobility  Rolling: supervision  Supine to sit: supervision  Sit to supine: NT  Scooting: supervision  independent   Transfers Sit to stand: CGA  Stand to sit: min assist    independent   Ambulation    40 feet with cane  with min assist  100 feet with AAD independent   Stair Negotiation  Ascended and descended  NT   4-12 steps with 1 rail with supervision   LE strength     3+/5    4/5   balance      fair     AM-PAC Raw score               1724         Pt is alert and Oriented
Physician Progress Note      PATIENT:               Sasha Jauregui  CSN #:                  [id number]  :                       1940  ADMIT DATE:       3/31/2023 4:15 PM  [address] [facility] [place] DATE:  Joi Veronica  PROVIDER #:        Drea Angel DO          QUERY TEXT:    Patient admitted with pneumonia, Human metapneumovirus. Documentation reflects   sepsis in H&P. If possible, please document in the progress notes and   discharge summary if sepsis was: The medical record reflects the following:  Risk Factors: bacterial pneumonia, human metapneumovirus  Clinical Indicators: H&P \"Septic-heart rate 130s, lactic acid 3.3 and then   2.4, hypoxic, and acute kidney injury. Roly Joshua Patient presents with sepsis secondary   to community-acquired pneumonia. \"  Afib RVR, lactic 3.3, procalcitonin 2.52, respiratory failure  Treatment: bipap, steroids, abx    Thank you,  Corinna Landry RN, CCDS  Clinical Documentation   [email or web address]  Options provided:  -- Sepsis confirmed after study  -- Sepsis treated and resolved  -- Sepsis ruled out after study  -- Other - I will add my own diagnosis  -- Disagree - Not applicable / Not valid  -- Disagree - Clinically unable to determine / Unknown  -- Refer to Clinical Documentation Reviewer    PROVIDER RESPONSE TEXT:    Sepsis ruled out after study.     Query created by: Magalie Medrano on 4/3/2023 3:20 PM      Electronically signed by:  Drea Angel DO 2023 7:47 AM
Subjective: The patient is awake and alert. No problems overnight. Feeling better     Objective:    BP (!) 143/70   Pulse 63   Temp 97.2 °F (36.2 °C) (Temporal)   Resp 18   Ht 5' 8\" (1.727 m)   Wt 185 lb (83.9 kg)   SpO2 93%   BMI 28.13 kg/m²     Heart:  RRR, no murmurs, gallops, or rubs.   Lungs:  CTA bilaterally, no wheeze, rales or rhonchi  Abd: bowel sounds present, nontender, nondistended, no masses  Extrem:  No clubbing, cyanosis, or edema    Labs:  CBC:   Lab Results   Component Value Date/Time    WBC 9.9 04/03/2023 04:00 AM    RBC 4.13 04/03/2023 04:00 AM    HGB 13.1 04/03/2023 04:00 AM    HCT 40.2 04/03/2023 04:00 AM    MCV 97.3 04/03/2023 04:00 AM    MCH 31.7 04/03/2023 04:00 AM    MCHC 32.6 04/03/2023 04:00 AM    RDW 15.0 04/03/2023 04:00 AM     04/03/2023 04:00 AM    MPV 10.8 04/03/2023 04:00 AM     CMP:    Lab Results   Component Value Date/Time     04/03/2023 05:52 AM    K 4.5 04/03/2023 05:52 AM     04/03/2023 05:52 AM    CO2 25 04/03/2023 05:52 AM    BUN 37 04/03/2023 05:52 AM    CREATININE 0.6 04/03/2023 05:52 AM    GFRAA >60 08/18/2021 03:43 PM    LABGLOM >60 04/03/2023 05:52 AM    GLUCOSE 135 04/03/2023 05:52 AM    PROT 5.5 04/03/2023 05:52 AM    LABALBU 2.4 04/03/2023 05:52 AM    CALCIUM 9.9 04/03/2023 05:52 AM    BILITOT <0.2 04/03/2023 05:52 AM    ALKPHOS 55 04/03/2023 05:52 AM    AST 33 04/03/2023 05:52 AM    ALT 24 04/03/2023 05:52 AM     PT/INR:  No results found for: PROTIME, INR       Current Facility-Administered Medications:     levalbuterol (XOPENEX) nebulization 0.63 mg, 0.63 mg, Nebulization, Q6H, Catheline Flash, APRN - CNP, 0.63 mg at 04/04/23 0857    metoprolol succinate (TOPROL XL) extended release tablet 25 mg, 25 mg, Oral, BID, Antonia Lazo PA-C, 25 mg at 04/04/23 0801    miconazole (MICOTIN) 2 % powder, , Topical, BID, Natali Negron MD, Given at 04/04/23 0809    apixaban (ELIQUIS) tablet 5 mg, 5 mg, Oral, BID, Antonia Lazo PA-C, 5 mg at
Subjective: The patient is awake and alert. No problems overnight. Feeling somewhat better  SOB persist     Objective:    /65   Pulse 64   Temp 97.3 °F (36.3 °C) (Temporal)   Resp 18   Ht 5' 8\" (1.727 m)   Wt 179 lb 11.2 oz (81.5 kg)   SpO2 98%   BMI 27.32 kg/m²     Heart: Irreg/Irreg no murmurs, gallops, or rubs.   Lungs:  Diffuse wheeze  scattered coarse sounds  Abd: bowel sounds present, nontender, nondistended, no masses  Extrem:  No clubbing, cyanosis, or edema    Labs:  CBC:   Lab Results   Component Value Date/Time    WBC 9.9 04/03/2023 04:00 AM    RBC 4.13 04/03/2023 04:00 AM    HGB 13.1 04/03/2023 04:00 AM    HCT 40.2 04/03/2023 04:00 AM    MCV 97.3 04/03/2023 04:00 AM    MCH 31.7 04/03/2023 04:00 AM    MCHC 32.6 04/03/2023 04:00 AM    RDW 15.0 04/03/2023 04:00 AM     04/03/2023 04:00 AM    MPV 10.8 04/03/2023 04:00 AM     CMP:    Lab Results   Component Value Date/Time     04/03/2023 05:52 AM    K 4.5 04/03/2023 05:52 AM     04/03/2023 05:52 AM    CO2 25 04/03/2023 05:52 AM    BUN 37 04/03/2023 05:52 AM    CREATININE 0.6 04/03/2023 05:52 AM    GFRAA >60 08/18/2021 03:43 PM    LABGLOM >60 04/03/2023 05:52 AM    GLUCOSE 135 04/03/2023 05:52 AM    PROT 5.5 04/03/2023 05:52 AM    LABALBU 2.4 04/03/2023 05:52 AM    CALCIUM 9.9 04/03/2023 05:52 AM    BILITOT <0.2 04/03/2023 05:52 AM    ALKPHOS 55 04/03/2023 05:52 AM    AST 33 04/03/2023 05:52 AM    ALT 24 04/03/2023 05:52 AM     PT/INR:  No results found for: PROTIME, INR       Current Facility-Administered Medications:     metoprolol succinate (TOPROL XL) extended release tablet 25 mg, 25 mg, Oral, BID, Antonia Lazo PA-C, 25 mg at 04/02/23 2051    miconazole (MICOTIN) 2 % powder, , Topical, BID, Marely Rodriguez MD, Given at 04/02/23 1930    apixaban (ELIQUIS) tablet 5 mg, 5 mg, Oral, BID, Zeeshan Garcia PA-C, 5 mg at 04/02/23 2051    predniSONE (DELTASONE) tablet 40 mg, 40 mg, Oral, Daily, Susana Bangura, APRN - CNP
Topical BID    apixaban  5 mg Oral BID    arformoterol tartrate  15 mcg Nebulization BID    budesonide  0.5 mg Nebulization BID    acetaminophen  1,000 mg Oral Once    sodium chloride flush  5-40 mL IntraVENous 2 times per day    albuterol  2.5 mg Nebulization Q4H WA    cefTRIAXone (ROCEPHIN) IV  1,000 mg IntraVENous Q24H    And    doxycycline hyclate  100 mg Oral 2 times per day    methylPREDNISolone  40 mg IntraVENous Q8H       Physical Exam:  General Appearance: appears comfortable in no acute distress. HEENT: Normocephalic atraumatic without obvious abnormality   Neck: Lips, mucosa, and tongue normal.  Supple, symmetrical, trachea midline, no adenopathy;thyroid:  no enlargement/tenderness/nodules or JVD. Lung: Breath sounds bilateral wheezing and rhonchi. Respirations   unlabored. Symmetrical expansion. Heart: irregular and tachycardiac  Abdomen: Soft, NT, ND. BS present x 4 quadrants. No bruit or organomegaly. Extremities: Pedal pulses 2+ symmetric b/l. Extremities normal, no cyanosis, clubbing, or edema. Musculokeletal: No joint swelling, no muscle tenderness. ROM normal in all joints of extremities. Neurologic: Mental status: Alert and Oriented X3 . Pertinent/ New Labs and Imaging Studies     Imaging Personally Reviewed:    CTA chest 3/31/23:      Impression   1. No pulmonary embolism. 2. Innumerable ill-defined pulmonary nodules, masses and   consolidative-appearing opacities which may be infectious, inflammatory or   neoplastic in etiology. 3. Mildly prominent mediastinal lymph nodes which may be reactive or   metastatic. 4. Hypertrophied left adrenal gland.        ECHO: none on file      Labs:  Lab Results   Component Value Date/Time    WBC 9.4 04/02/2023 04:12 AM    HGB 12.9 04/02/2023 04:12 AM    HCT 40.1 04/02/2023 04:12 AM    MCV 97.3 04/02/2023 04:12 AM    MCH 31.3 04/02/2023 04:12 AM    MCHC 32.2 04/02/2023 04:12 AM    RDW 14.8 04/02/2023 04:12 AM     04/02/2023 04:12 AM
elevated at 2.52. Patient also with bronchospasm related to these. Trying to wean oxygen. Unfortunately patient with hallucinations and agitation due to steroids and IV steroids will be stopped. We will likely restart oral steroids tomorrow if tolerated. Continue DuoNebs. 2.    A-fib with RVR  [place] to be due to patient's hypoxia and respiratory issues, but today TSH is 0.031. Consulted cardiology. Plan is to increase beta-blockade and wean off Cardizem. This should also help with what appears to be a hyperthyroid state. Echocardiogram ordered and is pending. 3.    Hyperthyroidism  Endocrinology consulted. Checking free T3 and free T4. Increase beta-blocker dose, patient already received steroids. Will defer further treatment to endocrine. Appreciate all consultant help.     Electronically signed by Tatyana Dobson MD on 4/2/2023 at 5:54 PM
benefit from continued skilled OT to increase safety and independence with completion of ADL/IADL tasks for functional independence and quality of life. Treatment: OT treatment provided this date included:   Instruction/training on safety and adapted techniques for completion of ADLs. Instruction/training on safe functional mobility/transfer techniques. Instruction/training on energy conservation/work simplification techniques for implementation into ADL/IADL routines. Patient education provided regardin) importance of OOB activities during hospitalization, 2) potential benefits of having assistance from family/friends, as needed, with ADLs/IADLs, to ensure safety upon discharge. Patient indicated understanding. Further skilled OT treatment indicated to increase patient's safety and independence with completion of ADL/IADL tasks in order to maximize patient's functional independence and quality of life. Rehab Potential: Good for established goals. Patient / Family Goal: Patient anticipates returning home upon discharge. Patient and/or family were instructed on functional diagnosis, prognosis/goals, and OT plan of care. Demonstrated Good understanding. Eval Complexity: Low    Time In: 1505  Time Out: 1530  Total Treatment Time: 10 minutes      Minutes Units   OT Eval Low 09507 15 1   OT Eval Medium 75390     OT Eval High 56612     OT Re-Eval [id number]     Therapeutic Ex 49413     Therapeutic Activities 32338     ADL/Self Care 09001 10 1   Orthotic Management 34633     Neuro Re-Ed 11299     Non-Billable Time N/A ---     Evaluation time includes thorough review of current medical information, gathering information on past medical history/social history and prior level of function, completion of standardized testing/informal observation of tasks, assessment of data, and education on plan of care and goals. JASKARAN Ramos/L  License Number: [id number]
for: PROTIME, INR  No results for input(s): PROBNP in the last 72 hours. No results for input(s): PROCAL in the last 72 hours. Latest Reference Range & Units 03/31/23 17:00   pH, Blood Gas 7.350 - 7.450  7.363   PCO2 35.0 - 45.0 mmHg 42.3   pO2 75.0 - 100.0 mmHg 55.5 (L)   HCO3 22.0 - 26.0 mmol/L 23.5   Base Excess -3.0 - 3.0 mmol/L -1.9   O2 Sat 92.0 - 98.5 % 87.8 (L)     proBNP 6194  Procalcitonin 2.52  proBNP pending   Assessment:    Acute hypoxic respiratory failure  Metapneumovirus tracheobronchitis  Possible secondary bacterial pneumonia  Probable COPD with acute exacerbation  Atrial fibrillation with RVR  Nicotine dependence  Mediastinal adenopathy, reactive  Hx L breast cancer       Plan:   Wean oxygen to maintain SpO2 greater than 92% 2 liters NC  Ambulatory oxygen testing prior to dc-orders placed for home O2 2 L nasal cannula to be worn 24/7 upon discharge  Prednisone with quick taper, no new hallucinations reported-orders placed for discharge  Bronchodilators-brovana, pulmicort, switched to xopenex for elevated HR. orders placed for Trelegy Ellipta and nebulizer with albuterol as needed for rescue. Heart rate improved. Continue Incentive spirometer, pep/flutter valve  Empiric CAP coverage-rocephin, doxy.-Ceftin and Doxy for 7 days for DC  Follow urine antigens, respiratory culture. Repeat procal, crp-pending, pro [id number]. No respiratory culture obtained. Stop IV fluids  Repeat cxr 323 improving  2 D echocardiogram-completed preserved EF 60%  Supportive care for viral illness  Smoking cessation, OP PFTs when recovered  PT, OT eval  Will need repeat Chest CT in 6 weeks to ensure resolution, message routed   Okay to discharge from a pulmonary perspective when okay with all others medications reconciled for discharge.   This plan of care was reviewed in collaboration with Dr. Anell Leyden    Electronically signed by NATALY Gibbs CNP on 4/4/2023 at 2:19 PM         I personally saw, examined and

## 2023-04-06 NOTE — DISCHARGE SUMMARY
specific details. The patient was discharged on 04/04/2023 in  satisfactory condition. She was maintained on home O2 and she will be  followed up by me in the office _____ transportation and ability within  one week. Please review med reconciliation sheet for specific details. The patient's discharge status is stable. Long discussion with the  patient concerning tobacco cessation considering her multiple issues.         Qamar Easton DO    D: 04/05/2023 14:47:47       T: 04/05/2023 14:50:10     [email or web address]  Job#: [id number]     Doc#: [id number]    CC:

## 2023-04-08 LAB — T3REVERSE SERPL-MCNC: 47.1 NG/DL (ref 9–27)

## 2023-08-04 ENCOUNTER — CARE COORDINATION (OUTPATIENT)
Dept: CARE COORDINATION | Age: 83
End: 2023-08-04

## 2023-08-04 NOTE — CARE COORDINATION
Received incoming call and message left from patient that her PCP referred her to Care Coordination and that she'd like to get signed up and to return her call. Returned patient call and verified patient information. Patient verbalized that her friend had care coordination and she would like to sign up. She called her doctor and her doctor said she was referred. Returned patient call as requested and patient very appreciative of return call. Patient verbalized that she spoke with Jossue Florian and stated that her PCP office was already in contact with [address] [place] [address] and it was all being handled at this time. Patient voiced that someone was going to get back to her. Patient excited about possible opportunity for Care Coordination enrollment. No further questions or needs while speaking to patient. Outreach to be determined by enrollment qualifications/referral process.

## 2023-09-16 ENCOUNTER — APPOINTMENT (OUTPATIENT)
Dept: GENERAL RADIOLOGY | Age: 83
End: 2023-09-16
Payer: MEDICARE

## 2023-09-16 ENCOUNTER — HOSPITAL ENCOUNTER (EMERGENCY)
Age: 83
Discharge: HOME | End: 2023-09-17
Attending: EMERGENCY MEDICINE
Payer: MEDICARE

## 2023-09-16 DIAGNOSIS — R42 LIGHTHEADEDNESS: Primary | ICD-10-CM

## 2023-09-16 LAB
ALBUMIN SERPL-MCNC: 3.8 G/DL (ref 3.5–5.2)
ALP SERPL-CCNC: 85 U/L (ref 35–104)
ALT SERPL-CCNC: 17 U/L (ref 0–32)
ANION GAP SERPL CALCULATED.3IONS-SCNC: 10 MMOL/L (ref 7–16)
AST SERPL-CCNC: 45 U/L (ref 0–31)
BASOPHILS # BLD: 0.02 K/UL (ref 0–0.2)
BASOPHILS NFR BLD: 0 % (ref 0–2)
BILIRUB SERPL-MCNC: 0.3 MG/DL (ref 0–1.2)
BNP SERPL-MCNC: 906 PG/ML (ref 0–450)
BUN SERPL-MCNC: 14 MG/DL (ref 6–23)
CALCIUM SERPL-MCNC: 9.9 MG/DL (ref 8.6–10.2)
CHLORIDE SERPL-SCNC: 102 MMOL/L (ref 98–107)
CO2 SERPL-SCNC: 25 MMOL/L (ref 22–29)
CREAT SERPL-MCNC: 0.8 MG/DL (ref 0.5–1)
EOSINOPHIL # BLD: 0.04 K/UL (ref 0.05–0.5)
EOSINOPHILS RELATIVE PERCENT: 1 % (ref 0–6)
ERYTHROCYTE [DISTWIDTH] IN BLOOD BY AUTOMATED COUNT: 18.8 % (ref 11.5–15)
GFR SERPL CREATININE-BSD FRML MDRD: >60 ML/MIN/1.73M2
GLUCOSE SERPL-MCNC: 106 MG/DL (ref 74–99)
HCT VFR BLD AUTO: 34.8 % (ref 34–48)
HGB BLD-MCNC: 9.9 G/DL (ref 11.5–15.5)
IMM GRANULOCYTES # BLD AUTO: <0.03 K/UL (ref 0–0.58)
IMM GRANULOCYTES NFR BLD: 0 % (ref 0–5)
LYMPHOCYTES NFR BLD: 1.4 K/UL (ref 1.5–4)
LYMPHOCYTES RELATIVE PERCENT: 18 % (ref 20–42)
MCH RBC QN AUTO: 22.1 PG (ref 26–35)
MCHC RBC AUTO-ENTMCNC: 28.4 G/DL (ref 32–34.5)
MCV RBC AUTO: 77.7 FL (ref 80–99.9)
MONOCYTES NFR BLD: 0.87 K/UL (ref 0.1–0.95)
MONOCYTES NFR BLD: 11 % (ref 2–12)
NEUTROPHILS NFR BLD: 70 % (ref 43–80)
NEUTS SEG NFR BLD: 5.59 K/UL (ref 1.8–7.3)
PLATELET # BLD AUTO: 298 K/UL (ref 130–450)
PMV BLD AUTO: 10.3 FL (ref 7–12)
POTASSIUM SERPL-SCNC: 5.1 MMOL/L (ref 3.5–5)
PROT SERPL-MCNC: 7.3 G/DL (ref 6.4–8.3)
RBC # BLD AUTO: 4.48 M/UL (ref 3.5–5.5)
SODIUM SERPL-SCNC: 137 MMOL/L (ref 132–146)
WBC OTHER # BLD: 7.9 K/UL (ref 4.5–11.5)

## 2023-09-16 PROCEDURE — 71045 X-RAY EXAM CHEST 1 VIEW: CPT

## 2023-09-16 PROCEDURE — 99285 EMERGENCY DEPT VISIT HI MDM: CPT

## 2023-09-16 PROCEDURE — 85025 COMPLETE CBC W/AUTO DIFF WBC: CPT

## 2023-09-16 PROCEDURE — 93005 ELECTROCARDIOGRAM TRACING: CPT

## 2023-09-16 PROCEDURE — 2580000003 HC RX 258

## 2023-09-16 PROCEDURE — 83880 ASSAY OF NATRIURETIC PEPTIDE: CPT

## 2023-09-16 PROCEDURE — 80053 COMPREHEN METABOLIC PANEL: CPT

## 2023-09-16 RX ORDER — 0.9 % SODIUM CHLORIDE 0.9 %
500 INTRAVENOUS SOLUTION INTRAVENOUS ONCE
Status: COMPLETED | OUTPATIENT
Start: 2023-09-16 | End: 2023-09-17

## 2023-09-16 RX ADMIN — SODIUM CHLORIDE 500 ML: 9 INJECTION, SOLUTION INTRAVENOUS at 22:19

## 2023-09-16 ASSESSMENT — LIFESTYLE VARIABLES: HOW OFTEN DO YOU HAVE A DRINK CONTAINING ALCOHOL: NEVER

## 2023-09-16 ASSESSMENT — PAIN - FUNCTIONAL ASSESSMENT: PAIN_FUNCTIONAL_ASSESSMENT: NONE - DENIES PAIN

## 2023-09-17 ENCOUNTER — HOSPITAL ENCOUNTER (INPATIENT)
Age: 83
LOS: 5 days | Discharge: HOME | DRG: 378 | End: 2023-09-23
Attending: STUDENT IN AN ORGANIZED HEALTH CARE EDUCATION/TRAINING PROGRAM | Admitting: FAMILY MEDICINE
Payer: MEDICARE

## 2023-09-17 ENCOUNTER — APPOINTMENT (OUTPATIENT)
Dept: GENERAL RADIOLOGY | Age: 83
DRG: 378 | End: 2023-09-17
Payer: MEDICARE

## 2023-09-17 VITALS
TEMPERATURE: 97.5 F | HEIGHT: 69 IN | BODY MASS INDEX: 25.18 KG/M2 | SYSTOLIC BLOOD PRESSURE: 145 MMHG | HEART RATE: 96 BPM | WEIGHT: 170 LBS | OXYGEN SATURATION: 94 % | RESPIRATION RATE: 20 BRPM | DIASTOLIC BLOOD PRESSURE: 73 MMHG

## 2023-09-17 DIAGNOSIS — I48.91 ATRIAL FIBRILLATION, UNSPECIFIED TYPE (HCC): Primary | ICD-10-CM

## 2023-09-17 DIAGNOSIS — R00.2 PALPITATIONS: ICD-10-CM

## 2023-09-17 DIAGNOSIS — R06.89 DYSPNEA AND RESPIRATORY ABNORMALITIES: ICD-10-CM

## 2023-09-17 DIAGNOSIS — R53.83 FATIGUE, UNSPECIFIED TYPE: ICD-10-CM

## 2023-09-17 DIAGNOSIS — R06.00 DYSPNEA AND RESPIRATORY ABNORMALITIES: ICD-10-CM

## 2023-09-17 DIAGNOSIS — D64.9 ANEMIA, UNSPECIFIED TYPE: ICD-10-CM

## 2023-09-17 LAB
ALBUMIN SERPL-MCNC: 3.7 G/DL (ref 3.5–5.2)
ALP SERPL-CCNC: 86 U/L (ref 35–104)
ALT SERPL-CCNC: 12 U/L (ref 0–32)
ANION GAP SERPL CALCULATED.3IONS-SCNC: 11 MMOL/L (ref 7–16)
AST SERPL-CCNC: 21 U/L (ref 0–31)
BASOPHILS # BLD: 0.01 K/UL (ref 0–0.2)
BASOPHILS NFR BLD: 0 % (ref 0–2)
BILIRUB SERPL-MCNC: 0.3 MG/DL (ref 0–1.2)
BILIRUB UR QL STRIP: NEGATIVE
BNP SERPL-MCNC: 1098 PG/ML (ref 0–450)
BUN SERPL-MCNC: 11 MG/DL (ref 6–23)
CALCIUM SERPL-MCNC: 9.8 MG/DL (ref 8.6–10.2)
CHLORIDE SERPL-SCNC: 103 MMOL/L (ref 98–107)
CLARITY UR: CLEAR
CO2 SERPL-SCNC: 24 MMOL/L (ref 22–29)
COLOR UR: YELLOW
CREAT SERPL-MCNC: 0.9 MG/DL (ref 0.5–1)
EOSINOPHIL # BLD: 0.03 K/UL (ref 0.05–0.5)
EOSINOPHILS RELATIVE PERCENT: 1 % (ref 0–6)
EPI CELLS #/AREA URNS HPF: ABNORMAL /HPF
ERYTHROCYTE [DISTWIDTH] IN BLOOD BY AUTOMATED COUNT: 19 % (ref 11.5–15)
GFR SERPL CREATININE-BSD FRML MDRD: >60 ML/MIN/1.73M2
GLUCOSE SERPL-MCNC: 109 MG/DL (ref 74–99)
GLUCOSE UR STRIP-MCNC: NEGATIVE MG/DL
HCT VFR BLD AUTO: 32.7 % (ref 34–48)
HGB BLD-MCNC: 9.5 G/DL (ref 11.5–15.5)
HGB UR QL STRIP.AUTO: NEGATIVE
IMM GRANULOCYTES # BLD AUTO: <0.03 K/UL (ref 0–0.58)
IMM GRANULOCYTES NFR BLD: 0 % (ref 0–5)
INR PPP: 1.7
KETONES UR STRIP-MCNC: NEGATIVE MG/DL
LEUKOCYTE ESTERASE UR QL STRIP: NEGATIVE
LYMPHOCYTES NFR BLD: 1.16 K/UL (ref 1.5–4)
LYMPHOCYTES RELATIVE PERCENT: 19 % (ref 20–42)
MAGNESIUM SERPL-MCNC: 1.8 MG/DL (ref 1.6–2.6)
MCH RBC QN AUTO: 22.6 PG (ref 26–35)
MCHC RBC AUTO-ENTMCNC: 29.1 G/DL (ref 32–34.5)
MCV RBC AUTO: 77.7 FL (ref 80–99.9)
MONOCYTES NFR BLD: 0.73 K/UL (ref 0.1–0.95)
MONOCYTES NFR BLD: 12 % (ref 2–12)
NEUTROPHILS NFR BLD: 68 % (ref 43–80)
NEUTS SEG NFR BLD: 4.08 K/UL (ref 1.8–7.3)
NITRITE UR QL STRIP: NEGATIVE
PH UR STRIP: 6 [PH] (ref 5–9)
PLATELET # BLD AUTO: 258 K/UL (ref 130–450)
PLATELET CONFIRMATION: NORMAL
PMV BLD AUTO: 9.4 FL (ref 7–12)
POTASSIUM SERPL-SCNC: 4 MMOL/L (ref 3.5–5)
PROT SERPL-MCNC: 6.9 G/DL (ref 6.4–8.3)
PROT UR STRIP-MCNC: NEGATIVE MG/DL
PROTHROMBIN TIME: 19 SEC (ref 9.3–12.4)
RBC # BLD AUTO: 4.21 M/UL (ref 3.5–5.5)
RBC #/AREA URNS HPF: ABNORMAL /HPF
SODIUM SERPL-SCNC: 138 MMOL/L (ref 132–146)
SP GR UR STRIP: <1.005 (ref 1–1.03)
TROPONIN I SERPL HS-MCNC: 27 NG/L (ref 0–9)
TROPONIN I SERPL HS-MCNC: 30 NG/L (ref 0–9)
TROPONIN I SERPL HS-MCNC: 34 NG/L (ref 0–9)
UROBILINOGEN UR STRIP-ACNC: 0.2 EU/DL (ref 0–1)
WBC #/AREA URNS HPF: ABNORMAL /HPF
WBC OTHER # BLD: 6 K/UL (ref 4.5–11.5)

## 2023-09-17 PROCEDURE — 83735 ASSAY OF MAGNESIUM: CPT

## 2023-09-17 PROCEDURE — 71045 X-RAY EXAM CHEST 1 VIEW: CPT

## 2023-09-17 PROCEDURE — 81001 URINALYSIS AUTO W/SCOPE: CPT

## 2023-09-17 PROCEDURE — 80053 COMPREHEN METABOLIC PANEL: CPT

## 2023-09-17 PROCEDURE — 83880 ASSAY OF NATRIURETIC PEPTIDE: CPT

## 2023-09-17 PROCEDURE — 93005 ELECTROCARDIOGRAM TRACING: CPT | Performed by: EMERGENCY MEDICINE

## 2023-09-17 PROCEDURE — 84484 ASSAY OF TROPONIN QUANT: CPT

## 2023-09-17 PROCEDURE — 85610 PROTHROMBIN TIME: CPT

## 2023-09-17 PROCEDURE — 85025 COMPLETE CBC W/AUTO DIFF WBC: CPT

## 2023-09-17 PROCEDURE — 99285 EMERGENCY DEPT VISIT HI MDM: CPT

## 2023-09-17 ASSESSMENT — ENCOUNTER SYMPTOMS
VOMITING: 0
SHORTNESS OF BREATH: 1
DIARRHEA: 0
ABDOMINAL PAIN: 0
COUGH: 0
NAUSEA: 0

## 2023-09-17 ASSESSMENT — PAIN - FUNCTIONAL ASSESSMENT
PAIN_FUNCTIONAL_ASSESSMENT: 0-10
PAIN_FUNCTIONAL_ASSESSMENT: NONE - DENIES PAIN

## 2023-09-17 ASSESSMENT — LIFESTYLE VARIABLES
HOW OFTEN DO YOU HAVE A DRINK CONTAINING ALCOHOL: NEVER
HOW MANY STANDARD DRINKS CONTAINING ALCOHOL DO YOU HAVE ON A TYPICAL DAY: PATIENT DOES NOT DRINK

## 2023-09-17 ASSESSMENT — PAIN DESCRIPTION - LOCATION: LOCATION: BACK

## 2023-09-17 ASSESSMENT — PAIN SCALES - GENERAL: PAINLEVEL_OUTOF10: 9

## 2023-09-17 NOTE — ED PROVIDER NOTES
Department of Emergency Medicine     Written by: Braden Roldan MD  Patient Name: Jacinda List Date: 2023  9:10 PM  MRN: [id number]                   : 1940      HPI  Chief Complaint   Patient presents with    Dizziness     PT reports dizziness since this am.  PT reports recent adjustment in doses with metoprolol and eliquis. PT reports metoprolol increase from 12.5mg qday to 25mg BID r/t PMH afib. Guido Rodriguez is a 82 y.o. female that presents to the ED with complaints of lightheadedness every time she stands up. The patient says that this started today morning. She says she recently had adjustments in metoprolol for atrial fibrillation is recent start on Eliquis. Patient does not follow with cardiology. Her atrial fibrillation is managed by her family doctor, Dr. Lencho Martines. The patient says that she used to be on 12.5 mg metoprolol for the past 20 years. Now she is currently 25 mg once in the morning and once at night for total 50 mg daily. She is concerned that this may be causing her symptoms. She denies chest pain or shortness of breath. She is unsure if she had leg swellings, has never been diagnosed with heart failure. She says she was admitted to the hospital a few months ago and she does not think she saw a cardiologist at that time. Review of systems:    Pertinent positives and negatives mentioned in the HPI/MDM.    ------------------------- PAST HISTORY -------------------------    I personally reviewed the following history:    Past Medical History:  has a past medical history of Breast cancer ([address]) and Heart palpitations. Past Surgical History:  has a past surgical history that includes knee surgery. Social History:  reports that she has been smoking cigarettes. She has been smoking an average of 1 pack per day. She does not have any smokeless tobacco history on file. She reports that she does not drink alcohol and does not use drugs.     Family

## 2023-09-17 NOTE — ED NOTES
PT requesting be d/c.  Plan of care explained to pt, risks of leaving, benefits of staying. Pt verbalized understanding stating \"I don't want to stay, my ride is here. \"  Dr. Katia Sher notified and spoke with pt. Received d/c AMA orders. PT a&ox4, talking with clear speech, walks with steady gait, denies SI/HI. AMA papers explained to pt; pt verbalized understanding and signed AMA papers.       Jeffery Valadez RN  09/17/23 2083

## 2023-09-18 ENCOUNTER — APPOINTMENT (OUTPATIENT)
Dept: ULTRASOUND IMAGING | Age: 83
DRG: 378 | End: 2023-09-18
Payer: MEDICARE

## 2023-09-18 PROBLEM — R77.8 ELEVATED TROPONIN LEVEL NOT DUE TO ACUTE CORONARY SYNDROME: Status: ACTIVE | Noted: 2023-09-18

## 2023-09-18 PROBLEM — D64.9 CHRONIC ANEMIA: Status: ACTIVE | Noted: 2023-09-18

## 2023-09-18 PROBLEM — R79.89 ELEVATED TROPONIN LEVEL NOT DUE TO ACUTE CORONARY SYNDROME: Status: ACTIVE | Noted: 2023-09-18

## 2023-09-18 PROBLEM — K92.2 GASTROINTESTINAL HEMORRHAGE: Status: ACTIVE | Noted: 2023-09-18

## 2023-09-18 LAB
EKG ATRIAL RATE: 84 BPM
EKG ATRIAL RATE: 94 BPM
EKG P AXIS: 73 DEGREES
EKG P-R INTERVAL: 212 MS
EKG Q-T INTERVAL: 380 MS
EKG Q-T INTERVAL: 384 MS
EKG QRS DURATION: 104 MS
EKG QRS DURATION: 118 MS
EKG QTC CALCULATION (BAZETT): 448 MS
EKG QTC CALCULATION (BAZETT): 472 MS
EKG R AXIS: -23 DEGREES
EKG R AXIS: -26 DEGREES
EKG T AXIS: -4 DEGREES
EKG T AXIS: 27 DEGREES
EKG VENTRICULAR RATE: 82 BPM
EKG VENTRICULAR RATE: 93 BPM
FERRITIN SERPL-MCNC: 12 NG/ML
IRON SATN MFR SERPL: 5 % (ref 15–50)
IRON SERPL-MCNC: 19 UG/DL (ref 37–145)
TIBC SERPL-MCNC: 417 UG/DL (ref 250–450)
TRANSFERRIN SERPL-MCNC: 311 MG/DL (ref 200–360)

## 2023-09-18 PROCEDURE — 2500000003 HC RX 250 WO HCPCS: Performed by: FAMILY MEDICINE

## 2023-09-18 PROCEDURE — 82728 ASSAY OF FERRITIN: CPT

## 2023-09-18 PROCEDURE — 6370000000 HC RX 637 (ALT 250 FOR IP): Performed by: INTERNAL MEDICINE

## 2023-09-18 PROCEDURE — 93971 EXTREMITY STUDY: CPT

## 2023-09-18 PROCEDURE — C9113 INJ PANTOPRAZOLE SODIUM, VIA: HCPCS | Performed by: STUDENT IN AN ORGANIZED HEALTH CARE EDUCATION/TRAINING PROGRAM

## 2023-09-18 PROCEDURE — 2060000000 HC ICU INTERMEDIATE R&B

## 2023-09-18 PROCEDURE — 93010 ELECTROCARDIOGRAM REPORT: CPT | Performed by: INTERNAL MEDICINE

## 2023-09-18 PROCEDURE — 2500000003 HC RX 250 WO HCPCS: Performed by: STUDENT IN AN ORGANIZED HEALTH CARE EDUCATION/TRAINING PROGRAM

## 2023-09-18 PROCEDURE — 6370000000 HC RX 637 (ALT 250 FOR IP): Performed by: STUDENT IN AN ORGANIZED HEALTH CARE EDUCATION/TRAINING PROGRAM

## 2023-09-18 PROCEDURE — 6360000002 HC RX W HCPCS: Performed by: STUDENT IN AN ORGANIZED HEALTH CARE EDUCATION/TRAINING PROGRAM

## 2023-09-18 PROCEDURE — 99222 1ST HOSP IP/OBS MODERATE 55: CPT | Performed by: STUDENT IN AN ORGANIZED HEALTH CARE EDUCATION/TRAINING PROGRAM

## 2023-09-18 PROCEDURE — 2580000003 HC RX 258: Performed by: STUDENT IN AN ORGANIZED HEALTH CARE EDUCATION/TRAINING PROGRAM

## 2023-09-18 PROCEDURE — A4216 STERILE WATER/SALINE, 10 ML: HCPCS | Performed by: STUDENT IN AN ORGANIZED HEALTH CARE EDUCATION/TRAINING PROGRAM

## 2023-09-18 PROCEDURE — 83540 ASSAY OF IRON: CPT

## 2023-09-18 PROCEDURE — 84466 ASSAY OF TRANSFERRIN: CPT

## 2023-09-18 RX ORDER — AMIODARONE HYDROCHLORIDE 200 MG/1
200 TABLET ORAL DAILY
Status: DISCONTINUED | OUTPATIENT
Start: 2023-09-19 | End: 2023-09-24 | Stop reason: HOSPADM

## 2023-09-18 RX ORDER — ONDANSETRON 4 MG/1
4 TABLET, ORALLY DISINTEGRATING ORAL EVERY 8 HOURS PRN
Status: DISCONTINUED | OUTPATIENT
Start: 2023-09-18 | End: 2023-09-24 | Stop reason: HOSPADM

## 2023-09-18 RX ORDER — SODIUM CHLORIDE 9 MG/ML
INJECTION, SOLUTION INTRAVENOUS PRN
Status: DISCONTINUED | OUTPATIENT
Start: 2023-09-18 | End: 2023-09-24 | Stop reason: HOSPADM

## 2023-09-18 RX ORDER — MAGNESIUM SULFATE IN WATER 40 MG/ML
2000 INJECTION, SOLUTION INTRAVENOUS ONCE
Status: COMPLETED | OUTPATIENT
Start: 2023-09-18 | End: 2023-09-18

## 2023-09-18 RX ORDER — SODIUM CHLORIDE 0.9 % (FLUSH) 0.9 %
5-40 SYRINGE (ML) INJECTION PRN
Status: DISCONTINUED | OUTPATIENT
Start: 2023-09-18 | End: 2023-09-24 | Stop reason: HOSPADM

## 2023-09-18 RX ORDER — ONDANSETRON 2 MG/ML
4 INJECTION INTRAMUSCULAR; INTRAVENOUS EVERY 6 HOURS PRN
Status: DISCONTINUED | OUTPATIENT
Start: 2023-09-18 | End: 2023-09-24 | Stop reason: HOSPADM

## 2023-09-18 RX ORDER — SODIUM CHLORIDE 0.9 % (FLUSH) 0.9 %
5-40 SYRINGE (ML) INJECTION EVERY 12 HOURS SCHEDULED
Status: DISCONTINUED | OUTPATIENT
Start: 2023-09-18 | End: 2023-09-24 | Stop reason: HOSPADM

## 2023-09-18 RX ORDER — METOPROLOL SUCCINATE 25 MG/1
25 TABLET, EXTENDED RELEASE ORAL 2 TIMES DAILY
Status: DISCONTINUED | OUTPATIENT
Start: 2023-09-18 | End: 2023-09-18

## 2023-09-18 RX ORDER — MECLIZINE HCL 12.5 MG/1
12.5 TABLET ORAL 3 TIMES DAILY PRN
COMMUNITY

## 2023-09-18 RX ORDER — AMIODARONE HYDROCHLORIDE 200 MG/1
200 TABLET ORAL ONCE
Status: COMPLETED | OUTPATIENT
Start: 2023-09-18 | End: 2023-09-18

## 2023-09-18 RX ORDER — ACETAMINOPHEN 650 MG/1
650 SUPPOSITORY RECTAL EVERY 6 HOURS PRN
Status: DISCONTINUED | OUTPATIENT
Start: 2023-09-18 | End: 2023-09-24 | Stop reason: HOSPADM

## 2023-09-18 RX ORDER — ACETAMINOPHEN 325 MG/1
650 TABLET ORAL EVERY 6 HOURS PRN
Status: DISCONTINUED | OUTPATIENT
Start: 2023-09-18 | End: 2023-09-24 | Stop reason: HOSPADM

## 2023-09-18 RX ORDER — POLYETHYLENE GLYCOL 3350 17 G/17G
17 POWDER, FOR SOLUTION ORAL DAILY PRN
Status: DISCONTINUED | OUTPATIENT
Start: 2023-09-18 | End: 2023-09-24 | Stop reason: HOSPADM

## 2023-09-18 RX ADMIN — SODIUM CHLORIDE 40 MG: 9 INJECTION INTRAMUSCULAR; INTRAVENOUS; SUBCUTANEOUS at 23:29

## 2023-09-18 RX ADMIN — ACETAMINOPHEN 650 MG: 325 TABLET ORAL at 23:29

## 2023-09-18 RX ADMIN — SODIUM CHLORIDE 80 MG: 9 INJECTION, SOLUTION INTRAMUSCULAR; INTRAVENOUS; SUBCUTANEOUS at 01:23

## 2023-09-18 RX ADMIN — SODIUM CHLORIDE 5 MG/HR: 900 INJECTION, SOLUTION INTRAVENOUS at 02:29

## 2023-09-18 RX ADMIN — SODIUM CHLORIDE, PRESERVATIVE FREE 10 ML: 5 INJECTION INTRAVENOUS at 03:52

## 2023-09-18 RX ADMIN — MAGNESIUM SULFATE HEPTAHYDRATE 2000 MG: 40 INJECTION, SOLUTION INTRAVENOUS at 02:31

## 2023-09-18 RX ADMIN — AMIODARONE HYDROCHLORIDE 200 MG: 200 TABLET ORAL at 21:37

## 2023-09-18 RX ADMIN — APIXABAN 5 MG: 5 TABLET, FILM COATED ORAL at 21:37

## 2023-09-18 RX ADMIN — SODIUM CHLORIDE 40 MG: 9 INJECTION INTRAMUSCULAR; INTRAVENOUS; SUBCUTANEOUS at 13:13

## 2023-09-18 RX ADMIN — SODIUM CHLORIDE 5 MG/HR: 900 INJECTION, SOLUTION INTRAVENOUS at 18:26

## 2023-09-18 RX ADMIN — Medication 10 ML: at 21:41

## 2023-09-18 RX ADMIN — APIXABAN 5 MG: 5 TABLET, FILM COATED ORAL at 08:34

## 2023-09-18 RX ADMIN — MICONAZOLE NITRATE: 20 POWDER TOPICAL at 18:36

## 2023-09-18 ASSESSMENT — PAIN DESCRIPTION - DESCRIPTORS: DESCRIPTORS: ACHING

## 2023-09-18 ASSESSMENT — PAIN SCALES - GENERAL
PAINLEVEL_OUTOF10: 0
PAINLEVEL_OUTOF10: 0
PAINLEVEL_OUTOF10: 7

## 2023-09-18 ASSESSMENT — PAIN DESCRIPTION - ORIENTATION: ORIENTATION: LOWER

## 2023-09-18 ASSESSMENT — PAIN DESCRIPTION - LOCATION: LOCATION: BACK

## 2023-09-18 NOTE — ED PROVIDER NOTES
[facility]        Pt Name: Mariano Cardoso  MRN: [id number]  [facility] 1940  Date of evaluation: 9/17/2023  Provider: Mini Martinez DO  PCP: Roosevelt Fuentes DO  Note Started: 9:35 PM EDT 9/17/23    CHIEF COMPLAINT       Chief Complaint   Patient presents with    Palpitations     Patient states she feels in in afib       HISTORY OF PRESENT ILLNESS: 1 or more Elements     Limitations to history : None    Mariano Cardoso is a 82 y.o. female with PMHx of atrial fibrillation on Eliquis and metoprolol, COPD, who presents for evaluation of palpitations, fatigue and dyspnea on exertion and concerned that she is in A-fib. She states over the past week or so she has been feeling very fatigued and having dyspnea on exertion and having episodic palpitations and heart racing sensation. She was actually diagnosed with atrial fibrillation in March 2023 when she was admitted to the hospital for RSV; she was increased on metoprolol and started on Eliquis 5 mg twice daily at that time. She endorses medication compliance. She follows only with her primary doctor for her A-fib, Dr. Aashish Strickland and has not been evaluated with cardiology for her A-fib. She was seen at a separate emergency department last night for the symptoms, appears to have been in normal sinus rhythm at the time and was discharged with a cardiology consult to Dr. Bree Hurd. She returns again today because her palpitations recurred and her symptoms are not improved. She otherwise denies any other recent illnesses or complaints; she has not had any abdominal pain, nausea vomiting or diarrhea, fevers, cough or sore throat, numbness or weakness anywhere, lower extremity edema or tenderness. Nursing Notes were all reviewed and agreed with or any disagreements were addressed in the HPI.       REVIEW OF EXTERNAL NOTE :       Internal medicine note from admission on

## 2023-09-18 NOTE — CONSULTS
Gastroenterology Consult Note   TAQUERIA Pisano with Fernanda Briceño M.D. Consult Note        Date of Service: 9/18/2023  Reason for Consult: Concern for GI Bleed  Requesting Physician: Dr. Nadege Bonner:  dizziness, palpitations and generalized weakness    History Obtained From:  patient, electronic medical record    HISTORY OF PRESENT ILLNESS:       Bassem Mauro is a 82 y.o. female with significant past medical history of A fib on Eliquis, breast CA s/p left-sided mastectomy, HTN and HLD presenting to ED for dizziness, palpitations and generalized weakness and admitted with A fib RVR and GI bleed. Pt reports dizziness, lightheadedness and generalized weakness sudden onset states \"because of my A-fib\". Had recent episode and was at [facility] however left AMA. Denies known history of GI bleeding. Reportedly started on Eliquis in March. Denies any overt signs of bleeding. Overall felt to be in good health. Reports intermittent epigastric pain heartburn using Tums and Pepcid with relief. No complaints of nausea, vomiting, abdominal pain, constipation or diarrhea. No prior endoscopic work-up. Admission labs hgb 9.9, , AST 45. Consultation for Concern for GI Bleed. Currently, pt reports she is feeling better now. No complaints of abdominal pain or nausea. Last BM brown. Labs today hgb 9.5, MCV 77.7, MCH 22.6, MCHC 29.1.     Past Medical History:        Diagnosis Date    Breast cancer ([address])     Chronic a-fib (HCC)     Heart palpitations     a fib     Past Surgical History:        Procedure Laterality Date    KNEE SURGERY      MASTECTOMY Left      Current Medications:    Current Facility-Administered Medications: apixaban (ELIQUIS) tablet 5 mg, 5 mg, Oral, BID  metoprolol succinate (TOPROL XL) extended release tablet 25 mg, 25 mg, Oral, BID  sodium chloride flush 0.9 % injection 5-40 mL, 5-40 mL, IntraVENous, 2 times per day  sodium chloride flush 0.9 % injection 5-40 mL, 5-40 Date/Time    PROTIME 19.0 09/17/2023 10:14 PM    INR 1.7 09/17/2023 10:14 PM     PTT:  No results found for: \"APTT\", \"PTT\"[APTT}  Last 3 Troponin:  No results found for: \"TROPONINI\"  TSH:    Lab Results   Component Value Date/Time    TSH 0.031 04/02/2023 04:12 AM       MARTINA:    Lab Results   Component Value Date/Time    MARTINA NEGATIVE 06/15/2018 03:33 PM     No components found for: \"CHLPL\"    Lab Results   Component Value Date    TRIG 142 11/15/2022    TRIG 113 08/18/2021    TRIG 96 10/02/2018       Lab Results   Component Value Date    HDL 53 11/15/2022    HDL 46 08/18/2021    HDL 44 10/02/2018       Lab Results   Component Value Date    LDLCALC 197 (H) 11/15/2022    LDLCALC 204 (H) 08/18/2021    LDLCALC 110 (H) 10/02/2018       Lab Results   Component Value Date    LABVLDL 28 11/15/2022    LABVLDL 23 08/18/2021    LABVLDL 19 10/02/2018        US DUP LOWER EXTREMITY LEFT ZANDER    Result Date: 9/18/2023  EXAMINATION: DUPLEX VENOUS ULTRASOUND OF THE LEFT LOWER EXTREMITY 9/18/2023 8:07 am TECHNIQUE: Duplex ultrasound using B-mode/gray scaled imaging and Doppler spectral analysis and color flow was obtained of the deep venous structures of the left lower extremity. COMPARISON: None. HISTORY: ORDERING SYSTEM PROVIDED HISTORY: Rule out DVT TECHNOLOGIST PROVIDED HISTORY: Reason for exam:->Rule out DVT What reading provider will be dictating this exam?->CRC FINDINGS: The visualized veins of the left lower extremity are patent and free of echogenic thrombus. The veins demonstrate good compressibility with normal color flow study and spectral analysis. No evidence of DVT in the left lower extremity. XR CHEST PORTABLE    Result Date: 9/17/2023  EXAMINATION: ONE XRAY VIEW OF THE CHEST 9/17/2023 9:43 pm COMPARISON: 09/16/2023 HISTORY: ORDERING SYSTEM PROVIDED HISTORY: palpitations TECHNOLOGIST PROVIDED HISTORY: Reason for exam:->palpitations FINDINGS: The lungs are without acute focal process.   There is no effusion or

## 2023-09-18 NOTE — CARE COORDINATION
Social Work discharge [facility]  spoke with pt, who lives alone in [place] with laundry in basement. She said only family is granddaughter Katalina, who is a single mom that works full time. Pt' uses a std cane and ww. Pt said she has a car but has not been driving recently. PCP is Dr Aashish Strickland. 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 Pt said her granddaughter's ex still comes over once a week to bring her laundry to . Pt said she does not qualify for Medicaid, but can not afford to pay for help at home. SHANNA advised pt of [address] [place] [address] for help at home and added contact info to AVS,    Pt is receptive to short term skilled home care for cp assess, med compliance, home pt ot if Dr agrees. She chose [facility], as she had them in past.  Shanna called referral to Cleo with [employer], await their eval and c order. Pt said her granddaughter can bring her home tomorrow if she is discharged. Pt denied other needs.   Electronically signed by Brant Diaz on 9/18/2023 at 3:49 PM

## 2023-09-18 NOTE — ACP (ADVANCE CARE PLANNING)
Advance Care Planning   Healthcare Decision Maker:    Primary Decision Maker: Bruno Altamirano - [phone]    Click here to complete Healthcare Decision Makers including selection of the Healthcare Decision Maker Relationship (ie \"Primary\").

## 2023-09-18 NOTE — ED NOTES
Patient resting comfortably in bed, respirations easy and unlabored. Patient in no current distress. Telemetry monitoring on, call light in reach. No stated problems or concerns at this time.       Alice Narvaez RN  09/17/23 9333

## 2023-09-18 NOTE — ED NOTES
Patient presents to the ED from home via private car for heart palpitations, patient reports that she was seen at [facility] ED yesterday for the same symptoms and they sent her home. Patient returned today because symptoms were worsening in severity and accompanied by SOB. Patient denies dizziness or chest pain. Patient has a history of a-fib. Patient ambulates with 1x assistance. Patient is a&o x4.       Julia Lyons RN  09/17/23 2080       Julia Lyons RN  09/17/23 0139

## 2023-09-18 NOTE — PROGRESS NOTES
4 Eyes Skin Assessment     NAME:  Crissy Rust  YOB: 1940  MEDICAL RECORD NUMBER:  [id number]    The patient is being assessed for  Admission    I agree that at least one RN has performed a thorough Head to Toe Skin Assessment on the patient. ALL assessment sites listed below have been assessed. Areas assessed by both nurses:    Head, Face, Ears, Shoulders, Back, Chest, Arms, Elbows, Hands, Sacrum. Buttock, Coccyx, Ischium, Legs. Feet and Heels, and Under Medical Devices         Does the Patient have a Wound?  No noted wound(s)       Nba Prevention initiated by RN: No  Wound Care Orders initiated by RN: No    Pressure Injury (Stage 3,4, Unstageable, DTI, NWPT, and Complex wounds) if present, place Wound referral order by RN under : No    New Ostomies, if present place, Ostomy referral order under : No     Nurse 1 eSignature: Electronically signed by Suman Crenshaw RN on 9/18/23 at 5:46 AM EDT    **SHARE this note so that the co-signing nurse can place an eSignature**    Nurse 2 eSignature: {Esignature:[id number]}

## 2023-09-18 NOTE — H&P
[facility] History and Physical      CHIEF COMPLAINT: Dizziness, palpitation, generalized weakness    History of Present Illness:    Ms. aJsmin Zayas is an 82-year-old female with past medical history significant for atrial fibrillation on Eliquis, breast cancer s/p left-sided mastectomy, Herceptin, radiation therapy, hypertension, hyperlipidemia who presents with dizziness, palpitations, generalized weakness for the past 2 days. In talking with Ms. Kathie Pastrana, she states approximately 2 days ago she noted onset of palpitations, dizziness and lightheadedness, especially when getting up to a standing position. She denies feeling like she was going to pass out. She also reports generalized weakness. Reports she has a history of atrial fibrillation and was seen in the emergency department at [facility] yesterday. She was referred outpatient to Dr. Joseph Escamilla yesterday. She is anticoagulated with Eliquis and is compliant with therapy. For these reasons she decided to represent to the [facility] emergency department for further evaluation. In the ED, she was found to be fecal occult blood positive. She reports NSAID use during the winter and spring time of this past year. She has since switched to Tylenol for back pain after starting anticoagulation for A-fib in the spring. In the ED, vitals on presentation were temp 98.2, RR 18, HR 63, /72, O2 sat 98% on room air. Lab work was obtained which revealed serum sodium 138, potassium 4.0, bicarb 24, creatinine 0.9, magnesium 1.8, blood glucose 109, proBNP 1098, high-sensitivity troponin 30 followed by 34. CBC was obtained which revealed WBC 6.0, hemoglobin 9.5 with MCV 77.7, platelets 133O. EKG was obtained which revealed atrial fibrillation, incomplete RBBB, HR 93, QTc 472 MS. Chest x-ray was obtained which revealed no acute process.     In the ED, patient was given IV Protonix 80 mg

## 2023-09-19 LAB
ANION GAP SERPL CALCULATED.3IONS-SCNC: 8 MMOL/L (ref 7–16)
BASOPHILS # BLD: 0.01 K/UL (ref 0–0.2)
BASOPHILS NFR BLD: 0 % (ref 0–2)
BUN SERPL-MCNC: 12 MG/DL (ref 6–23)
CALCIUM SERPL-MCNC: 9.5 MG/DL (ref 8.6–10.2)
CHLORIDE SERPL-SCNC: 104 MMOL/L (ref 98–107)
CO2 SERPL-SCNC: 26 MMOL/L (ref 22–29)
CREAT SERPL-MCNC: 0.9 MG/DL (ref 0.5–1)
EOSINOPHIL # BLD: 0.1 K/UL (ref 0.05–0.5)
EOSINOPHILS RELATIVE PERCENT: 2 % (ref 0–6)
ERYTHROCYTE [DISTWIDTH] IN BLOOD BY AUTOMATED COUNT: 18.9 % (ref 11.5–15)
GFR SERPL CREATININE-BSD FRML MDRD: >60 ML/MIN/1.73M2
GLUCOSE BLD-MCNC: 124 MG/DL (ref 74–99)
GLUCOSE SERPL-MCNC: 100 MG/DL (ref 74–99)
HCT VFR BLD AUTO: 30.8 % (ref 34–48)
HGB BLD-MCNC: 8.7 G/DL (ref 11.5–15.5)
IMM GRANULOCYTES # BLD AUTO: <0.03 K/UL (ref 0–0.58)
IMM GRANULOCYTES NFR BLD: 0 % (ref 0–5)
LYMPHOCYTES NFR BLD: 1.58 K/UL (ref 1.5–4)
LYMPHOCYTES RELATIVE PERCENT: 28 % (ref 20–42)
MCH RBC QN AUTO: 21.9 PG (ref 26–35)
MCHC RBC AUTO-ENTMCNC: 28.2 G/DL (ref 32–34.5)
MCV RBC AUTO: 77.6 FL (ref 80–99.9)
MONOCYTES NFR BLD: 0.7 K/UL (ref 0.1–0.95)
MONOCYTES NFR BLD: 12 % (ref 2–12)
NEUTROPHILS NFR BLD: 58 % (ref 43–80)
NEUTS SEG NFR BLD: 3.3 K/UL (ref 1.8–7.3)
PLATELET # BLD AUTO: 248 K/UL (ref 130–450)
PMV BLD AUTO: 9.6 FL (ref 7–12)
POTASSIUM SERPL-SCNC: 4.1 MMOL/L (ref 3.5–5)
RBC # BLD AUTO: 3.97 M/UL (ref 3.5–5.5)
RBC # BLD: ABNORMAL 10*6/UL
SODIUM SERPL-SCNC: 138 MMOL/L (ref 132–146)
T4 FREE SERPL-MCNC: 1.2 NG/DL (ref 0.9–1.7)
TSH SERPL DL<=0.05 MIU/L-ACNC: 1.71 UIU/ML (ref 0.27–4.2)
WBC OTHER # BLD: 5.7 K/UL (ref 4.5–11.5)

## 2023-09-19 PROCEDURE — 6370000000 HC RX 637 (ALT 250 FOR IP): Performed by: INTERNAL MEDICINE

## 2023-09-19 PROCEDURE — 80048 BASIC METABOLIC PNL TOTAL CA: CPT

## 2023-09-19 PROCEDURE — 84443 ASSAY THYROID STIM HORMONE: CPT

## 2023-09-19 PROCEDURE — 6370000000 HC RX 637 (ALT 250 FOR IP): Performed by: STUDENT IN AN ORGANIZED HEALTH CARE EDUCATION/TRAINING PROGRAM

## 2023-09-19 PROCEDURE — 82962 GLUCOSE BLOOD TEST: CPT

## 2023-09-19 PROCEDURE — 2060000000 HC ICU INTERMEDIATE R&B

## 2023-09-19 PROCEDURE — 99232 SBSQ HOSP IP/OBS MODERATE 35: CPT | Performed by: INTERNAL MEDICINE

## 2023-09-19 PROCEDURE — 84439 ASSAY OF FREE THYROXINE: CPT

## 2023-09-19 PROCEDURE — 2580000003 HC RX 258: Performed by: STUDENT IN AN ORGANIZED HEALTH CARE EDUCATION/TRAINING PROGRAM

## 2023-09-19 PROCEDURE — 85025 COMPLETE CBC W/AUTO DIFF WBC: CPT

## 2023-09-19 RX ORDER — FERROUS SULFATE 325(65) MG
325 TABLET ORAL 2 TIMES DAILY WITH MEALS
Status: DISCONTINUED | OUTPATIENT
Start: 2023-09-19 | End: 2023-09-24 | Stop reason: HOSPADM

## 2023-09-19 RX ORDER — LORAZEPAM 0.5 MG/1
0.5 TABLET ORAL NIGHTLY PRN
Status: DISCONTINUED | OUTPATIENT
Start: 2023-09-19 | End: 2023-09-20

## 2023-09-19 RX ADMIN — Medication 1 TABLET: at 13:42

## 2023-09-19 RX ADMIN — MICONAZOLE NITRATE: 20 POWDER TOPICAL at 20:22

## 2023-09-19 RX ADMIN — MICONAZOLE NITRATE: 20 POWDER TOPICAL at 08:21

## 2023-09-19 RX ADMIN — ACETAMINOPHEN 650 MG: 325 TABLET ORAL at 22:50

## 2023-09-19 RX ADMIN — AMIODARONE HYDROCHLORIDE 200 MG: 200 TABLET ORAL at 08:21

## 2023-09-19 RX ADMIN — FERROUS SULFATE TAB 325 MG (65 MG ELEMENTAL FE) 325 MG: 325 (65 FE) TAB at 16:37

## 2023-09-19 RX ADMIN — LORAZEPAM 0.5 MG: 0.5 TABLET ORAL at 18:54

## 2023-09-19 RX ADMIN — APIXABAN 5 MG: 5 TABLET, FILM COATED ORAL at 08:21

## 2023-09-19 RX ADMIN — Medication 10 ML: at 08:21

## 2023-09-19 RX ADMIN — Medication 10 ML: at 20:21

## 2023-09-19 ASSESSMENT — ENCOUNTER SYMPTOMS
CONSTIPATION: 0
SHORTNESS OF BREATH: 0
BLOOD IN STOOL: 0
DIARRHEA: 0
ANAL BLEEDING: 0
RECTAL PAIN: 0

## 2023-09-19 NOTE — PROGRESS NOTES
Progress  Note  Chief Complaint   Patient presents with    Palpitations     Patient states she feels in in afib     Historical Issues:  Current Facility-Administered Medications   Medication Dose Route Frequency Provider Last Rate Last Admin    folic acid-pyridoxine-cyancobalamin 2.5-25-2 mg tablet (FOLTX) 2.5-25-2 MG 2.5-25-2 mg tablet 1 tablet  1 tablet Oral Daily Mane Clemons MD   1 tablet at 09/19/23 1342    ferrous sulfate (IRON 325) tablet 325 mg  325 mg Oral BID  Mane Clemons MD        apixaban [facility] Perfect) tablet 5 mg  5 mg Oral BID Montrell Ca MD   5 mg at 09/19/23 3165    sodium chloride flush 0.9 % injection 5-40 mL  5-40 mL IntraVENous 2 times per day Montrell Ca MD   10 mL at 09/19/23 0821    sodium chloride flush 0.9 % injection 5-40 mL  5-40 mL IntraVENous PRN Montrell Ca MD   10 mL at 09/18/23 0352    0.9 % sodium chloride infusion   IntraVENous PRN Montrell Ca MD        ondansetron (ZOFRAN-ODT) disintegrating tablet 4 mg  4 mg Oral Q8H PRN Montrell Ca MD        Or    ondansetron [facility]) injection 4 mg  4 mg IntraVENous Q6H PRN Montrell Ca MD        polyethylene glycol (GLYCOLAX) packet 17 g  17 g Oral Daily PRN Montrell Ca MD        acetaminophen (TYLENOL) tablet 650 mg  650 mg Oral Q6H PRN Montrell Ca MD   650 mg at 09/18/23 2329    Or    acetaminophen (TYLENOL) suppository 650 mg  650 mg Rectal Q6H PRN Montrell Ca MD        miconazole (MICOTIN) 2 % powder   Topical BID Mecca Mobeetie, DO   Given at 09/19/23 2586    amiodarone (CORDARONE) tablet 200 mg  200 mg Oral Daily Abisai Mckoyer, DO   200 mg at 09/19/23 4070     Recent Complaints:  Review of Systems   Constitutional: Negative. Respiratory:  Negative for shortness of breath. Cardiovascular:  Positive for palpitations. Negative for chest pain and leg swelling.    Gastrointestinal:  Negative for anal bleeding, blood in stool, constipation, diarrhea and rectal pain. Vitals:    09/19/23 1100   BP:    Pulse:    Resp:    Temp:    SpO2: 94%     Physical Exam  Constitutional:       Appearance: Normal appearance. Cardiovascular:      Rate and Rhythm: Normal rate. Rhythm irregular. Pulmonary:      Effort: Pulmonary effort is normal.      Breath sounds: Normal breath sounds. Abdominal:      General: There is no distension. Tenderness: There is no abdominal tenderness. There is no guarding. Neurological:      Mental Status: She is alert. Recent Labs     09/16/23 2145 09/17/23 2214 09/19/23  0126    138 138   K 5.1* 4.0 4.1    103 104   CO2 25 24 26   BUN 14 11 12   CREATININE 0.8 0.9 0.9   GLUCOSE 106* 109* 100*   CALCIUM 9.9 9.8 9.5     Recent Labs     09/16/23 2145 09/17/23 2214 09/19/23  0126   WBC 7.9 6.0 5.7   RBC 4.48 4.21 3.97   HGB 9.9* 9.5* 8.7*   HCT 34.8 32.7* 30.8*   MCV 77.7* 77.7* 77.6*   MCH 22.1* 22.6* 21.9*   MCHC 28.4* 29.1* 28.2*   RDW 18.8* 19.0* 18.9*    258 248   MPV 10.3 9.4 9.6           Principal Problem:    Atrial fibrillation with RVR (HCC)  Active Problems:    Chronic anemia    Gastrointestinal hemorrhage    Elevated troponin level not due to acute coronary syndrome  Resolved Problems:    * No resolved hospital problems. *      Plan:  Jermaine Clark came to the hospital because of tachycardia. She was found to have decreased hemoglobin. It appears that she has become anemic since April. Earlier in the year she had a hemoglobin as high as 15.9 diminished down to 12.9 in early April. There have been no follow-up CBCs at that point in time in September she presented to the hospital and was found to have a hemoglobin of approximately 9.1. She has a low MCV associated with the GI symptomatology does not reveal black tarry stools or other forms of blood loss. Is possible that she has had some intermittent bleeding from an AVM, polyp or even gastritis.   She will undergo endoscopy

## 2023-09-19 NOTE — CARE COORDINATION
CASE MANAGEMENT. ... Patient has [employer] and has questions regarding her coverage for this hospital admission. I informed her that while she is hospitalized (even if she remains in past Oct 1) her services will still be covered by [place]. Should she discharge and return after Oct 1 then [facility] will not be considered an accepting provider. She voices an understanding. Gi/Cardio on board. Orders in to place eliquis on hold for EGD tomorrow. Off iv cardizem gtt. Started on Amiodarone. Ms Duenas July remains interested in [facility]. Confirmed with Cleo from [facility] that patient is accepted. [facility] orders obtained. Will follow.

## 2023-09-19 NOTE — PROGRESS NOTES
with Dr. Yasmeen Ocampo if ok with cardiology, will need to hold Eliquis if ok with cardiology  Orders placed  NPO after midnight   Protonix 40 mg BID  GERD diet  Supportive care  Continue to monitor       Note: This report was completed utilizing computer voice recognition software. Every effort has been made to ensure accuracy, however; inadvertent computerized transcription errors may be present.      Discussed with Dr. Joni Dela Cruz per Dr. Karlos Cowden APRN-NP-C 9/19/2023  11:45 AM For Dr. Yasmeen Ocampo

## 2023-09-19 NOTE — CONSULTS
CARDIOLOGY CONSULTATION      Patient Name:  Crissy Rust    :  1940    Reason for Consultation:   Atrial fibrillation rapid ventricular response    History of Present Illness:   Crissy Rust presents to [address] [place] [address], following a 2 day history of palpitations and lightheadedness similar to what she has experienced nearly 2 years ago when she was initially found to be in atrial fibrillation with a rapid ventricular response. At that time she was placed on metoprolol and has faired reasonably wellUntil recently when she noted the onset of palpitations despite metoprolol continued to feel an irregular rapid heart rhythm and lightheadedness. She had no chest discomfort but felt profoundly weak as if she was going to lose consciousness. She came to the emergency room for further evaluation and was found to be in atrial fibrillation despite her medical regimen was thus placed on a diltiazem drip with subsequent conversion to sinus rhythm. She has no previous history of myocardial infarction nor congestive heart failure or known significant valvular disease. In fact a two-dimensional echocardiogram was obtained in April of this year demonstrating well-preserved left ventricular systolic function and with only minor valvular regurgitant dysfunction. She is now admitted for further observation and treatment. Past Medical History:   has a past medical history of Breast cancer ([address]), Chronic a-fib ([address]), and Heart palpitations. Surgical History:   has a past surgical history that includes knee surgery and Mastectomy (Left). Social History:   reports that she has been smoking cigarettes. She has been smoking an average of 1 pack per day. She does not have any smokeless tobacco history on file. She reports that she does not drink alcohol and does not use drugs. Family History:  family history is not on file.      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 Sierra should be considered for dofetilide therapy as we should try to avoid beta-blocker therapy with underlying lung disease. Unfortunately, she feels that she has no insurance coverage to cover this hospital stay and wishes to be discharged by the AM.  For this reason I will empirically place her on low-dose amiodarone and follow-up as an outpatient but she should continue her appropriate anticoagulant therapy as initiated by Dr.F Wolf Perkins. Additionally a TSH and free T4 will be obtained in the a.m. to make sure that she is not hyperthyroid presently. finally we will also closely monitor her anemia and underlying source especially consideration for long-term anticoagulant therapy versus a Watchman flex device insertion. I have spent more than 55 minutes face to face with Ana Corbett reviewing notes and laboratory data with greater than 50% of this time instructing and counseling the patient  regarding my findings and recommendations and I have answered all questions as posed to me by Ms. Sierra. Thank you, Alicja Lou DO for allowing me to consult in the care of this patient. Lynn Roca DO , FACP, [facility], [facility]    NOTE:  This report was transcribed using voice recognition software.   Every effort was made to ensure accuracy; however, inadvertent computerized

## 2023-09-20 ENCOUNTER — ANESTHESIA EVENT (OUTPATIENT)
Dept: ENDOSCOPY | Age: 83
End: 2023-09-20
Payer: MEDICARE

## 2023-09-20 ENCOUNTER — ANESTHESIA (OUTPATIENT)
Dept: ENDOSCOPY | Age: 83
End: 2023-09-20
Payer: MEDICARE

## 2023-09-20 LAB
ALBUMIN SERPL-MCNC: 3.2 G/DL (ref 3.5–5.2)
ALP SERPL-CCNC: 67 U/L (ref 35–104)
ALT SERPL-CCNC: 11 U/L (ref 0–32)
ANION GAP SERPL CALCULATED.3IONS-SCNC: 4 MMOL/L (ref 7–16)
AST SERPL-CCNC: 16 U/L (ref 0–31)
BASOPHILS # BLD: 0.02 K/UL (ref 0–0.2)
BASOPHILS NFR BLD: 0 % (ref 0–2)
BILIRUB SERPL-MCNC: 0.2 MG/DL (ref 0–1.2)
BUN SERPL-MCNC: 11 MG/DL (ref 6–23)
CALCIUM SERPL-MCNC: 9.2 MG/DL (ref 8.6–10.2)
CHLORIDE SERPL-SCNC: 105 MMOL/L (ref 98–107)
CO2 SERPL-SCNC: 29 MMOL/L (ref 22–29)
CREAT SERPL-MCNC: 0.9 MG/DL (ref 0.5–1)
EOSINOPHIL # BLD: 0.11 K/UL (ref 0.05–0.5)
EOSINOPHILS RELATIVE PERCENT: 2 % (ref 0–6)
ERYTHROCYTE [DISTWIDTH] IN BLOOD BY AUTOMATED COUNT: 19 % (ref 11.5–15)
GFR SERPL CREATININE-BSD FRML MDRD: >60 ML/MIN/1.73M2
GLUCOSE SERPL-MCNC: 102 MG/DL (ref 74–99)
HCT VFR BLD AUTO: 30.5 % (ref 34–48)
HGB BLD-MCNC: 8.7 G/DL (ref 11.5–15.5)
IMM GRANULOCYTES # BLD AUTO: <0.03 K/UL (ref 0–0.58)
IMM GRANULOCYTES NFR BLD: 0 % (ref 0–5)
INR PPP: 1.2
LYMPHOCYTES NFR BLD: 1.39 K/UL (ref 1.5–4)
LYMPHOCYTES RELATIVE PERCENT: 26 % (ref 20–42)
MCH RBC QN AUTO: 22.4 PG (ref 26–35)
MCHC RBC AUTO-ENTMCNC: 28.5 G/DL (ref 32–34.5)
MCV RBC AUTO: 78.4 FL (ref 80–99.9)
MONOCYTES NFR BLD: 0.62 K/UL (ref 0.1–0.95)
MONOCYTES NFR BLD: 12 % (ref 2–12)
NEUTROPHILS NFR BLD: 59 % (ref 43–80)
NEUTS SEG NFR BLD: 3.12 K/UL (ref 1.8–7.3)
PLATELET # BLD AUTO: 233 K/UL (ref 130–450)
PMV BLD AUTO: 9.6 FL (ref 7–12)
POTASSIUM SERPL-SCNC: 4.1 MMOL/L (ref 3.5–5)
PROT SERPL-MCNC: 5.9 G/DL (ref 6.4–8.3)
PROTHROMBIN TIME: 13.3 SEC (ref 9.3–12.4)
RBC # BLD AUTO: 3.89 M/UL (ref 3.5–5.5)
RBC # BLD: ABNORMAL 10*6/UL
SODIUM SERPL-SCNC: 138 MMOL/L (ref 132–146)
WBC OTHER # BLD: 5.3 K/UL (ref 4.5–11.5)

## 2023-09-20 PROCEDURE — 3700000000 HC ANESTHESIA ATTENDED CARE: Performed by: INTERNAL MEDICINE

## 2023-09-20 PROCEDURE — 6360000002 HC RX W HCPCS: Performed by: INTERNAL MEDICINE

## 2023-09-20 PROCEDURE — 85610 PROTHROMBIN TIME: CPT

## 2023-09-20 PROCEDURE — 2709999900 HC NON-CHARGEABLE SUPPLY: Performed by: INTERNAL MEDICINE

## 2023-09-20 PROCEDURE — 6370000000 HC RX 637 (ALT 250 FOR IP): Performed by: STUDENT IN AN ORGANIZED HEALTH CARE EDUCATION/TRAINING PROGRAM

## 2023-09-20 PROCEDURE — 7100000011 HC PHASE II RECOVERY - ADDTL 15 MIN: Performed by: INTERNAL MEDICINE

## 2023-09-20 PROCEDURE — 3609012400 HC EGD TRANSORAL BIOPSY SINGLE/MULTIPLE: Performed by: INTERNAL MEDICINE

## 2023-09-20 PROCEDURE — 6370000000 HC RX 637 (ALT 250 FOR IP): Performed by: INTERNAL MEDICINE

## 2023-09-20 PROCEDURE — 85025 COMPLETE CBC W/AUTO DIFF WBC: CPT

## 2023-09-20 PROCEDURE — 6360000002 HC RX W HCPCS: Performed by: NURSE ANESTHETIST, CERTIFIED REGISTERED

## 2023-09-20 PROCEDURE — 80053 COMPREHEN METABOLIC PANEL: CPT

## 2023-09-20 PROCEDURE — 99232 SBSQ HOSP IP/OBS MODERATE 35: CPT | Performed by: INTERNAL MEDICINE

## 2023-09-20 PROCEDURE — 0DB68ZX EXCISION OF STOMACH, VIA NATURAL OR ARTIFICIAL OPENING ENDOSCOPIC, DIAGNOSTIC: ICD-10-PCS | Performed by: INTERNAL MEDICINE

## 2023-09-20 PROCEDURE — 2060000000 HC ICU INTERMEDIATE R&B

## 2023-09-20 PROCEDURE — 87077 CULTURE AEROBIC IDENTIFY: CPT

## 2023-09-20 PROCEDURE — 2580000003 HC RX 258: Performed by: STUDENT IN AN ORGANIZED HEALTH CARE EDUCATION/TRAINING PROGRAM

## 2023-09-20 PROCEDURE — 7100000010 HC PHASE II RECOVERY - FIRST 15 MIN: Performed by: INTERNAL MEDICINE

## 2023-09-20 PROCEDURE — 6370000000 HC RX 637 (ALT 250 FOR IP): Performed by: NURSE PRACTITIONER

## 2023-09-20 PROCEDURE — 2580000003 HC RX 258: Performed by: NURSE ANESTHETIST, CERTIFIED REGISTERED

## 2023-09-20 RX ORDER — SODIUM CHLORIDE 9 MG/ML
INJECTION, SOLUTION INTRAVENOUS CONTINUOUS PRN
Status: DISCONTINUED | OUTPATIENT
Start: 2023-09-20 | End: 2023-09-20 | Stop reason: SDUPTHER

## 2023-09-20 RX ORDER — LORAZEPAM 0.5 MG/1
0.5 TABLET ORAL EVERY 6 HOURS PRN
Status: DISCONTINUED | OUTPATIENT
Start: 2023-09-20 | End: 2023-09-24 | Stop reason: HOSPADM

## 2023-09-20 RX ORDER — PROPOFOL 10 MG/ML
INJECTION, EMULSION INTRAVENOUS PRN
Status: DISCONTINUED | OUTPATIENT
Start: 2023-09-20 | End: 2023-09-20 | Stop reason: SDUPTHER

## 2023-09-20 RX ORDER — PANTOPRAZOLE SODIUM 40 MG/1
40 TABLET, DELAYED RELEASE ORAL 2 TIMES DAILY
Status: DISCONTINUED | OUTPATIENT
Start: 2023-09-20 | End: 2023-09-24 | Stop reason: HOSPADM

## 2023-09-20 RX ORDER — LORAZEPAM 2 MG/ML
0.5 INJECTION INTRAMUSCULAR ONCE
Status: COMPLETED | OUTPATIENT
Start: 2023-09-20 | End: 2023-09-20

## 2023-09-20 RX ADMIN — FERROUS SULFATE TAB 325 MG (65 MG ELEMENTAL FE) 325 MG: 325 (65 FE) TAB at 17:37

## 2023-09-20 RX ADMIN — ACETAMINOPHEN 650 MG: 325 TABLET ORAL at 23:28

## 2023-09-20 RX ADMIN — Medication 10 ML: at 20:05

## 2023-09-20 RX ADMIN — PANTOPRAZOLE SODIUM 40 MG: 40 TABLET, DELAYED RELEASE ORAL at 20:05

## 2023-09-20 RX ADMIN — Medication 10 ML: at 08:16

## 2023-09-20 RX ADMIN — MICONAZOLE NITRATE: 20 POWDER TOPICAL at 08:17

## 2023-09-20 RX ADMIN — AMIODARONE HYDROCHLORIDE 200 MG: 200 TABLET ORAL at 08:16

## 2023-09-20 RX ADMIN — MICONAZOLE NITRATE: 20 POWDER TOPICAL at 20:05

## 2023-09-20 RX ADMIN — PROPOFOL 140 MG: 10 INJECTION, EMULSION INTRAVENOUS at 14:02

## 2023-09-20 RX ADMIN — LORAZEPAM 0.5 MG: 2 INJECTION INTRAMUSCULAR; INTRAVENOUS at 13:09

## 2023-09-20 RX ADMIN — SODIUM CHLORIDE: 9 INJECTION, SOLUTION INTRAVENOUS at 13:48

## 2023-09-20 ASSESSMENT — PAIN SCALES - GENERAL
PAINLEVEL_OUTOF10: 9
PAINLEVEL_OUTOF10: 0

## 2023-09-20 ASSESSMENT — PAIN DESCRIPTION - FREQUENCY: FREQUENCY: CONTINUOUS

## 2023-09-20 ASSESSMENT — PAIN - FUNCTIONAL ASSESSMENT
PAIN_FUNCTIONAL_ASSESSMENT: PREVENTS OR INTERFERES SOME ACTIVE ACTIVITIES AND ADLS
PAIN_FUNCTIONAL_ASSESSMENT: 0-10

## 2023-09-20 ASSESSMENT — PAIN DESCRIPTION - LOCATION: LOCATION: BACK

## 2023-09-20 ASSESSMENT — ENCOUNTER SYMPTOMS
GASTROINTESTINAL NEGATIVE: 1
RESPIRATORY NEGATIVE: 1

## 2023-09-20 ASSESSMENT — PAIN DESCRIPTION - ORIENTATION: ORIENTATION: LOWER

## 2023-09-20 ASSESSMENT — LIFESTYLE VARIABLES: SMOKING_STATUS: 1

## 2023-09-20 ASSESSMENT — PAIN DESCRIPTION - ONSET: ONSET: ON-GOING

## 2023-09-20 ASSESSMENT — PAIN DESCRIPTION - PAIN TYPE: TYPE: CHRONIC PAIN

## 2023-09-20 ASSESSMENT — PAIN DESCRIPTION - DESCRIPTORS: DESCRIPTORS: ACHING;DISCOMFORT;SORE

## 2023-09-20 NOTE — PROGRESS NOTES
Progress  Note  Chief Complaint   Patient presents with    Palpitations     Patient states she feels in in afib     Historical Issues:  Current Facility-Administered Medications   Medication Dose Route Frequency Provider Last Rate Last Admin    pantoprazole (PROTONIX) tablet 40 mg  40 mg Oral BID NATALY Mojica - CNP        folic acid-pyridoxine-cyancobalamin 2.5-25-2 mg tablet (FOLTX) 2.5-25-2 MG 2.5-25-2 mg tablet 1 tablet  1 tablet Oral Daily Jeff Mistry MD   1 tablet at 09/19/23 1342    ferrous sulfate (IRON 325) tablet 325 mg  325 mg Oral BID WC Jeff Mistry MD   325 mg at 09/19/23 1637    LORazepam (ATIVAN) tablet 0.5 mg  0.5 mg Oral Nightly PRN Jeff Mistry MD   0.5 mg at 09/19/23 1854    [Held by provider] apixaban (ELIQUIS) tablet 5 mg  5 mg Oral BID Amparo Enciso MD   5 mg at 09/19/23 8424    sodium chloride flush 0.9 % injection 5-40 mL  5-40 mL IntraVENous 2 times per day Amparo Enciso MD   10 mL at 09/20/23 0816    sodium chloride flush 0.9 % injection 5-40 mL  5-40 mL IntraVENous PRN Amparo Enciso MD   10 mL at 09/18/23 0352    0.9 % sodium chloride infusion   IntraVENous PRN Amparo Enciso MD        ondansetron (ZOFRAN-ODT) disintegrating tablet 4 mg  4 mg Oral Q8H PRN Amparo Enciso MD        Or    ondansetron [facility]) injection 4 mg  4 mg IntraVENous Q6H PRN Amparo Enciso MD        polyethylene glycol (GLYCOLAX) packet 17 g  17 g Oral Daily PRN Amparo Enciso MD        acetaminophen (TYLENOL) tablet 650 mg  650 mg Oral Q6H PRN Amparo Enciso MD   650 mg at 09/19/23 2250    Or    acetaminophen (TYLENOL) suppository 650 mg  650 mg Rectal Q6H PRN Amparo Enciso MD        miconazole (MICOTIN) 2 % powder   Topical BID Maribel Jasso DO   Given at 09/20/23 2546    amiodarone (CORDARONE) tablet 200 mg  200 mg Oral Daily Luis Proper, DO   200 mg at 09/20/23 3292     Recent Complaints:  Review of Systems

## 2023-09-20 NOTE — CARE COORDINATION
CASE MANAGEMENT. .. Patient NPO for EGD today. Cardio following for afib. Was started on Amiodarone. Was on Eliquis pta. Discharge plan is home with [facility]. [address] orders in. Anticipate dc later today/tomorrow. Cleo with [facility] notified. Will follow.

## 2023-09-20 NOTE — PROGRESS NOTES
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 Following confirmation with nursing staff her departure can be postponed as she will not lose her insurance coverage until October 1. REVIEW OF SYSTEMS:  General ROS: positive for - fatigue, malaise  Psychological ROS: negative for - anxiety , depression  Ophthalmic ROS: positive for - decreased vision or visual distortion. ENT ROS: negative  Allergy and Immunology ROS: negative  Hematological and Lymphatic ROS: negative  Endocrine: no heat or cold intolerance and no polyphagia, polydipsia, or polyuria  Respiratory ROS: positive for - shortness of breath  Cardiovascular ROS: positive for - dyspnea on exertion, irregular heartbeat, and shortness of breath. Gastrointestinal ROS: no abdominal pain, change in bowel habits, or black or bloody stools  Genito-Urinary ROS: no nocturia, dysuria, trouble voiding, frequency or hematuria  Musculoskeletal ROS: negative for- myalgias, arthralgias, or claudication  Neurological ROS: no TIA or stroke symptoms otherwise no significant change in symptoms or problems since yesterday as documented in previous progress notes.     SCHEDULED reviewing notes and laboratory data, with greater than 50% of this time instructing and counseling the patient  face to face regarding my findings and recommendations and I have answered all questions as posed to me by Ms. Sierra. Elliott Johnson, DO FACP,FACC,FSCAI      NOTE:  This report was transcribed using voice recognition software.   Every effort was made to ensure accuracy; however, inadvertent computerized transcription errors may be present

## 2023-09-20 NOTE — PROGRESS NOTES
Patient for EGD today for anemia occult positive stools. Hemoglobin today 8.7. History of A-fib on Eliquis. Additional questions and concerns addressed. Consent signed. Labs chart reviewed. Okay to proceed.     NATALY Hamm - CNP 9/20/2023. 11:35 AM

## 2023-09-20 NOTE — BRIEF OP NOTE
Brief Postoperative Note    Denis Peterson  YOB: 1940  [id number]    Procedure: EGD with biopsy    Anesthesia: [facility]    Surgeon:  Simone Keith MD    Findings:       Esophagus:  GERD      Stomach:  Small linear gastric ulcer Gastritis bx done to rule out H. Pylori      Duodenum:  Normal         Complications: None      Estimated blood loss: none      Libertad Peace MD

## 2023-09-20 NOTE — PROGRESS NOTES
Dr. Nini Terrazas notified that patient is requesting a one time dose of the ativan that she takes nightly before her EGD, new orders received

## 2023-09-21 ENCOUNTER — ANESTHESIA EVENT (OUTPATIENT)
Dept: ENDOSCOPY | Age: 83
End: 2023-09-21
Payer: MEDICARE

## 2023-09-21 LAB
HELIOBACTER PYLORI ID: NEGATIVE
SOURCE, 60200063: NORMAL

## 2023-09-21 PROCEDURE — 6370000000 HC RX 637 (ALT 250 FOR IP): Performed by: INTERNAL MEDICINE

## 2023-09-21 PROCEDURE — 85014 HEMATOCRIT: CPT

## 2023-09-21 PROCEDURE — 2580000003 HC RX 258: Performed by: STUDENT IN AN ORGANIZED HEALTH CARE EDUCATION/TRAINING PROGRAM

## 2023-09-21 PROCEDURE — 2060000000 HC ICU INTERMEDIATE R&B

## 2023-09-21 PROCEDURE — 85018 HEMOGLOBIN: CPT

## 2023-09-21 PROCEDURE — 6370000000 HC RX 637 (ALT 250 FOR IP): Performed by: NURSE PRACTITIONER

## 2023-09-21 PROCEDURE — 99231 SBSQ HOSP IP/OBS SF/LOW 25: CPT | Performed by: INTERNAL MEDICINE

## 2023-09-21 RX ORDER — BISACODYL 5 MG/1
30 TABLET, DELAYED RELEASE ORAL ONCE
Status: COMPLETED | OUTPATIENT
Start: 2023-09-21 | End: 2023-09-21

## 2023-09-21 RX ADMIN — AMIODARONE HYDROCHLORIDE 200 MG: 200 TABLET ORAL at 07:46

## 2023-09-21 RX ADMIN — PANTOPRAZOLE SODIUM 40 MG: 40 TABLET, DELAYED RELEASE ORAL at 20:14

## 2023-09-21 RX ADMIN — MICONAZOLE NITRATE: 20 POWDER TOPICAL at 07:47

## 2023-09-21 RX ADMIN — MICONAZOLE NITRATE: 20 POWDER TOPICAL at 20:18

## 2023-09-21 RX ADMIN — FERROUS SULFATE TAB 325 MG (65 MG ELEMENTAL FE) 325 MG: 325 (65 FE) TAB at 07:46

## 2023-09-21 RX ADMIN — FERROUS SULFATE TAB 325 MG (65 MG ELEMENTAL FE) 325 MG: 325 (65 FE) TAB at 17:18

## 2023-09-21 RX ADMIN — Medication 10 ML: at 07:47

## 2023-09-21 RX ADMIN — BISACODYL 30 MG: 5 TABLET, COATED ORAL at 17:18

## 2023-09-21 RX ADMIN — POLYETHYLENE GLYCOL-3350 AND ELECTROLYTES 2000 ML: 236; 6.74; 5.86; 2.97; 22.74 POWDER, FOR SOLUTION ORAL at 13:26

## 2023-09-21 RX ADMIN — Medication 10 ML: at 20:14

## 2023-09-21 RX ADMIN — PANTOPRAZOLE SODIUM 40 MG: 40 TABLET, DELAYED RELEASE ORAL at 07:46

## 2023-09-21 RX ADMIN — Medication 1 TABLET: at 07:46

## 2023-09-21 ASSESSMENT — PAIN SCALES - GENERAL: PAINLEVEL_OUTOF10: 0

## 2023-09-21 ASSESSMENT — ENCOUNTER SYMPTOMS: RESPIRATORY NEGATIVE: 1

## 2023-09-21 NOTE — PLAN OF CARE
Problem: Discharge Planning  Goal: Discharge to home or other facility with appropriate resources  9/20/2023 2107 by Erik Morrow RN  Outcome: Progressing     Problem: Pain  Goal: Verbalizes/displays adequate comfort level or baseline comfort level  9/20/2023 2107 by Erik Morrow RN  Outcome: Progressing     Problem: Safety - Adult  Goal: Free from fall injury  9/20/2023 2107 by Erik Morrow RN  Outcome: Progressing     Problem: ABCDS Injury Assessment  Goal: Absence of physical injury  9/20/2023 2107 by Erik Morrow RN  Outcome: Progressing     Problem: Skin/Tissue Integrity  Goal: Absence of new skin breakdown  9/20/2023 2107 by Erik Morrow RN  Outcome: Progressing
Problem: Discharge Planning  Goal: Discharge to home or other facility with appropriate resources  9/20/2023 2107 by Veronika Elaine RN  Outcome: Progressing     Problem: Pain  Goal: Verbalizes/displays adequate comfort level or baseline comfort level  9/20/2023 2107 by Veronika Elaine RN  Outcome: Progressing     Problem: Safety - Adult  Goal: Free from fall injury  9/20/2023 2107 by Veronika Elaine RN  Outcome: Progressing     Problem: ABCDS Injury Assessment  Goal: Absence of physical injury  Outcome: Progressing     Problem: Skin/Tissue Integrity  Goal: Absence of new skin breakdown  Description: 1. Monitor for areas of redness and/or skin breakdown  2. Assess vascular access sites hourly  3. Every 4-6 hours minimum:  Change oxygen saturation probe site  4. Every 4-6 hours:  If on nasal continuous positive airway pressure, respiratory therapy assess nares and determine need for appliance change or resting period.   Outcome: Progressing
Problem: Discharge Planning  Goal: Discharge to home or other facility with appropriate resources  Outcome: Progressing     Problem: Pain  Goal: Verbalizes/displays adequate comfort level or baseline comfort level  Outcome: Progressing     Problem: Safety - Adult  Goal: Free from fall injury  Outcome: Progressing
Problem: Discharge Planning  Goal: Discharge to home or other facility with appropriate resources  Outcome: Progressing     Problem: Pain  Goal: Verbalizes/displays adequate comfort level or baseline comfort level  Outcome: Progressing     Problem: Safety - Adult  Goal: Free from fall injury  Outcome: Progressing     Problem: ABCDS Injury Assessment  Goal: Absence of physical injury  Outcome: Progressing
Yes

## 2023-09-21 NOTE — CARE COORDINATION
CASE MANAGEMENT. ... EGD yesterday. No source of bleeding noted. Will start clear liquids and prep today for c scope tomorrow. Cardio following. Tolerating amiodarone. On Eliquis pta-currently on hold. Discharge plan is home with [facility]. Anticipate tomorrow pending pcp/gi/cardio input. [facility] orders in place. Cleo Gonzalez updated on above. Will follow.

## 2023-09-21 NOTE — ANESTHESIA POSTPROCEDURE EVALUATION
Department of Anesthesiology  Postprocedure Note    Patient: Tim House  MRN: [id number]  YOB: 1940  Date of evaluation: 9/21/2023      Procedure Summary     Date: 09/20/23 Room / Location: [facility] / [facility]    Anesthesia Start: 1355 Anesthesia Stop: 1408    Procedure: EGD BIOPSY Diagnosis:       Anemia, unspecified type      (Anemia, unspecified type [D64.9])    Surgeons: Patricia Lagunas MD Responsible Provider: Dallas Prieto DO    Anesthesia Type: MAC ASA Status: 3          Anesthesia Type: No value filed.     Paul Phase I: Paul Score: 10    Paul Phase II: Paul Score: 10      Anesthesia Post Evaluation    Patient location during evaluation: PACU  Patient participation: complete - patient participated  Level of consciousness: awake and alert  Nausea & Vomiting: no vomiting and no nausea  Complications: no  Cardiovascular status: hemodynamically stable  Respiratory status: acceptable and spontaneous ventilation  Hydration status: stable  Pain management: adequate

## 2023-09-21 NOTE — OP NOTE
[address]                  [address] [facility], [address] [place] [address]                                OPERATIVE REPORT    PATIENT NAME: Nano Sebastian                 :        1940  MED REC NO:   [id number]                            ROOM:       0430  ACCOUNT NO:   [de-identified]                           ADMIT DATE: 2023  PROVIDER:     Tila Sabillon MD    DATE OF PROCEDURE:  2023    PROCEDURE PERFORMED:  Upper endoscopy with biopsy. PREOPERATIVE DIAGNOSES:  Anemia and abdominal pain. POSTOPERATIVE DIAGNOSES:  Grade B LA classification GERD. Gastritis,  biopsied to rule out H. pylori infection. Small linear gastric ulcer at  the body was also seen. Duodenum unremarkable. ANESTHESIA:  LMAC. NOTE:  Prior to the procedure an informed consent was obtained from the  patient after explaining the benefits as well as the risks,  alternatives, and complications of the procedure to the patient, who  understood and agreed. PROCEDURE:  With the patient in the left lateral decubitus position, the  Olympus GIF-100 forward-viewing videoscope was introduced into the  esophagus, the evaluation of which showed grade B LA classification GERD  and no hiatal hernia was seen. The scope was then advanced through the gastroesophageal junction into  the gastric body, along the greater curvature. Evaluation of the body  of the stomach showed gastritis, biopsied to rule out H. pylori  infection. The scope was then advanced through the pylorus into the duodenal bulb  and second portion of the duodenum, both of which appeared to be  unremarkable. The scope was then retrieved and retroflexed in the  prepyloric antrum, with thorough evaluation of the cardiac and fundal  portions of the stomach, which appeared to be within normal limits.     The scope was then straightened, the area deflated, and the procedure  was terminated by withdrawing the scope and conducting a second look on  the way out, which was essentially the same. The patient tolerated the procedure well.         Kaylah Viveros MD    D: 09/20/2023 15:16:14       T: 09/20/2023 15:18:41     [email or web address]  Job#: [id number]     Doc#: [id number]    CC:  Nina Mcclendon MD

## 2023-09-21 NOTE — PROGRESS NOTES
09/20/23 0132   WBC 5.7 5.3   HGB 8.7* 8.7*   HCT 30.8* 30.5*    233     BMP:  Recent Labs     09/19/23  0126 09/20/23 0132    138   K 4.1 4.1    105   CO2 26 29   BUN 12 11   CREATININE 0.9 0.9   LABGLOM >60 >60     ABGs:   Lab Results   Component Value Date/Time    PH 7.363 03/31/2023 05:00 PM    PO2 55.5 03/31/2023 05:00 PM    PCO2 42.3 03/31/2023 05:00 PM     INR:   Recent Labs     09/20/23 0132   INR 1.2     PRO-BNP:   Lab Results   Component Value Date    PROBNP 1,098 (H) 09/17/2023    PROBNP 906 (H) 09/16/2023      TSH:   Lab Results   Component Value Date    TSH 1.71 09/19/2023      Cardiac Injury Profile:   No results for input(s): \"TROPHS\" in the last 72 hours. Lipid Profile:   Lab Results   Component Value Date/Time    TRIG 142 11/15/2022 01:51 PM    HDL 53 11/15/2022 01:51 PM    100 [facility] 197 11/15/2022 01:51 PM    CHOL 278 11/15/2022 01:51 PM      Hemoglobin A1C: No components found for: \"HGBA1C\"      RAD:   US DUP LOWER EXTREMITY LEFT ZANDER    Result Date: 9/18/2023  No evidence of DVT in the left lower extremity.          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 Likewise if no active bleeding should be on chronic anticoagulation and would consider colonoscopy before discharge as I have significant concern and withholding anticoagulation and increasing exposure to potential systemic embolization and less absolutely contraindicated following identification of source of bleeding. I have spent more than 25 minutes face to face with Melody Craig and reviewing notes and laboratory data, with greater than 50% of this time instructing and counseling the patient  face to face regarding my findings and recommendations and I have answered all questions as posed to me by Ms. Sierra. Shantanu Morelos, DO FACP,FACC,[facility]      NOTE:  This report was transcribed using voice recognition software.   Every effort was made to ensure accuracy; however, inadvertent computerized transcription errors may be present

## 2023-09-22 ENCOUNTER — ANESTHESIA (OUTPATIENT)
Dept: ENDOSCOPY | Age: 83
End: 2023-09-22
Payer: MEDICARE

## 2023-09-22 PROBLEM — I48.21 PERMANENT ATRIAL FIBRILLATION (HCC): Status: ACTIVE | Noted: 2023-09-22

## 2023-09-22 PROBLEM — K57.30 DIVERTICULOSIS OF COLON: Status: ACTIVE | Noted: 2023-09-22

## 2023-09-22 LAB
ALBUMIN SERPL-MCNC: 3.9 G/DL (ref 3.5–5.2)
ALP SERPL-CCNC: 81 U/L (ref 35–104)
ALT SERPL-CCNC: 16 U/L (ref 0–32)
ANION GAP SERPL CALCULATED.3IONS-SCNC: 11 MMOL/L (ref 7–16)
AST SERPL-CCNC: 28 U/L (ref 0–31)
ATYPICAL LYMPHOCYTE ABSOLUTE COUNT: 0.23 K/UL (ref 0–0.46)
ATYPICAL LYMPHOCYTES: 4 % (ref 0–4)
BASOPHILS # BLD: 0.06 K/UL (ref 0–0.2)
BASOPHILS NFR BLD: 1 % (ref 0–2)
BILIRUB SERPL-MCNC: 0.4 MG/DL (ref 0–1.2)
BUN SERPL-MCNC: 10 MG/DL (ref 6–23)
CALCIUM SERPL-MCNC: 10.1 MG/DL (ref 8.6–10.2)
CHLORIDE SERPL-SCNC: 103 MMOL/L (ref 98–107)
CO2 SERPL-SCNC: 26 MMOL/L (ref 22–29)
CREAT SERPL-MCNC: 0.9 MG/DL (ref 0.5–1)
EOSINOPHIL # BLD: 0.06 K/UL (ref 0.05–0.5)
EOSINOPHILS RELATIVE PERCENT: 1 % (ref 0–6)
ERYTHROCYTE [DISTWIDTH] IN BLOOD BY AUTOMATED COUNT: 20.1 % (ref 11.5–15)
GFR SERPL CREATININE-BSD FRML MDRD: >60 ML/MIN/1.73M2
GLUCOSE SERPL-MCNC: 101 MG/DL (ref 74–99)
HCT VFR BLD AUTO: 37.4 % (ref 34–48)
HGB BLD-MCNC: 10.7 G/DL (ref 11.5–15.5)
INR PPP: 1.1
LYMPHOCYTES NFR BLD: 0.86 K/UL (ref 1.5–4)
LYMPHOCYTES RELATIVE PERCENT: 13 % (ref 20–42)
MCH RBC QN AUTO: 22.5 PG (ref 26–35)
MCHC RBC AUTO-ENTMCNC: 28.6 G/DL (ref 32–34.5)
MCV RBC AUTO: 78.7 FL (ref 80–99.9)
MONOCYTES NFR BLD: 0.52 K/UL (ref 0.1–0.95)
MONOCYTES NFR BLD: 8 % (ref 2–12)
NEUTROPHILS NFR BLD: 74 % (ref 43–80)
NEUTS SEG NFR BLD: 4.88 K/UL (ref 1.8–7.3)
PLATELET # BLD AUTO: 300 K/UL (ref 130–450)
PMV BLD AUTO: 10.1 FL (ref 7–12)
POTASSIUM SERPL-SCNC: 3.7 MMOL/L (ref 3.5–5)
PROT SERPL-MCNC: 7.2 G/DL (ref 6.4–8.3)
PROTHROMBIN TIME: 11.9 SEC (ref 9.3–12.4)
RBC # BLD AUTO: 4.75 M/UL (ref 3.5–5.5)
RBC # BLD: ABNORMAL 10*6/UL
SODIUM SERPL-SCNC: 140 MMOL/L (ref 132–146)
WBC OTHER # BLD: 6.6 K/UL (ref 4.5–11.5)

## 2023-09-22 PROCEDURE — 7100000011 HC PHASE II RECOVERY - ADDTL 15 MIN: Performed by: INTERNAL MEDICINE

## 2023-09-22 PROCEDURE — 80053 COMPREHEN METABOLIC PANEL: CPT

## 2023-09-22 PROCEDURE — 3700000001 HC ADD 15 MINUTES (ANESTHESIA): Performed by: INTERNAL MEDICINE

## 2023-09-22 PROCEDURE — 2060000000 HC ICU INTERMEDIATE R&B

## 2023-09-22 PROCEDURE — 2709999900 HC NON-CHARGEABLE SUPPLY: Performed by: INTERNAL MEDICINE

## 2023-09-22 PROCEDURE — 6360000002 HC RX W HCPCS: Performed by: NURSE ANESTHETIST, CERTIFIED REGISTERED

## 2023-09-22 PROCEDURE — 85610 PROTHROMBIN TIME: CPT

## 2023-09-22 PROCEDURE — 6370000000 HC RX 637 (ALT 250 FOR IP): Performed by: INTERNAL MEDICINE

## 2023-09-22 PROCEDURE — 88305 TISSUE EXAM BY PATHOLOGIST: CPT

## 2023-09-22 PROCEDURE — 7100000010 HC PHASE II RECOVERY - FIRST 15 MIN: Performed by: INTERNAL MEDICINE

## 2023-09-22 PROCEDURE — 2580000003 HC RX 258: Performed by: NURSE ANESTHETIST, CERTIFIED REGISTERED

## 2023-09-22 PROCEDURE — 99232 SBSQ HOSP IP/OBS MODERATE 35: CPT | Performed by: INTERNAL MEDICINE

## 2023-09-22 PROCEDURE — 85025 COMPLETE CBC W/AUTO DIFF WBC: CPT

## 2023-09-22 PROCEDURE — 0DBK8ZZ EXCISION OF ASCENDING COLON, VIA NATURAL OR ARTIFICIAL OPENING ENDOSCOPIC: ICD-10-PCS | Performed by: INTERNAL MEDICINE

## 2023-09-22 PROCEDURE — 2580000003 HC RX 258: Performed by: INTERNAL MEDICINE

## 2023-09-22 PROCEDURE — 36415 COLL VENOUS BLD VENIPUNCTURE: CPT

## 2023-09-22 PROCEDURE — 2580000003 HC RX 258: Performed by: STUDENT IN AN ORGANIZED HEALTH CARE EDUCATION/TRAINING PROGRAM

## 2023-09-22 PROCEDURE — 3700000000 HC ANESTHESIA ATTENDED CARE: Performed by: INTERNAL MEDICINE

## 2023-09-22 PROCEDURE — 3609010600 HC COLONOSCOPY POLYPECTOMY SNARE/COLD BIOPSY: Performed by: INTERNAL MEDICINE

## 2023-09-22 RX ORDER — SODIUM CHLORIDE 9 MG/ML
INJECTION, SOLUTION INTRAVENOUS CONTINUOUS PRN
Status: DISCONTINUED | OUTPATIENT
Start: 2023-09-22 | End: 2023-09-22 | Stop reason: SDUPTHER

## 2023-09-22 RX ORDER — PROPOFOL 10 MG/ML
INJECTION, EMULSION INTRAVENOUS PRN
Status: DISCONTINUED | OUTPATIENT
Start: 2023-09-22 | End: 2023-09-22 | Stop reason: SDUPTHER

## 2023-09-22 RX ADMIN — FERROUS SULFATE TAB 325 MG (65 MG ELEMENTAL FE) 325 MG: 325 (65 FE) TAB at 15:30

## 2023-09-22 RX ADMIN — AMIODARONE HYDROCHLORIDE 200 MG: 200 TABLET ORAL at 08:28

## 2023-09-22 RX ADMIN — SODIUM CHLORIDE: 9 INJECTION, SOLUTION INTRAVENOUS at 12:37

## 2023-09-22 RX ADMIN — GLUCAGON 1 MG: KIT at 12:59

## 2023-09-22 RX ADMIN — MICONAZOLE NITRATE: 20 POWDER TOPICAL at 21:03

## 2023-09-22 RX ADMIN — MICONAZOLE NITRATE: 20 POWDER TOPICAL at 08:55

## 2023-09-22 RX ADMIN — PANTOPRAZOLE SODIUM 40 MG: 40 TABLET, DELAYED RELEASE ORAL at 21:03

## 2023-09-22 RX ADMIN — PROPOFOL 220 MG: 10 INJECTION, EMULSION INTRAVENOUS at 13:17

## 2023-09-22 RX ADMIN — Medication 10 ML: at 21:03

## 2023-09-22 RX ADMIN — Medication 10 ML: at 08:28

## 2023-09-22 ASSESSMENT — PAIN SCALES - GENERAL
PAINLEVEL_OUTOF10: 0

## 2023-09-22 ASSESSMENT — LIFESTYLE VARIABLES: SMOKING_STATUS: 1

## 2023-09-22 NOTE — CARE COORDINATION
CASE MANAGEMENT. ... Patient for c scope this afternoon. Per conversation with Dr Angel De Santiago, Ms Americo Castillo will be ready after procedure today, barring any issues. Met with patient and she is aware of the above. Encouraged her to arrange transport home for later today. [facility] on board-notified of dc plans. Orders in place. Will follow. Per conversation with Dr Karmen Gregory, patient is stable from cardiac pov.  Order obtained

## 2023-09-22 NOTE — BRIEF OP NOTE
Brief Postoperative Note    Akanksha Marrufo  YOB: 1940  [id number]    Procedure: Colonoscopy    Anesthesia: [facility]    Surgeon:  Airam Godfrey MD    Findings:   SEVERE AND DIFFUSE DIVERTICULOSIS WITH POOR PREP. POLYP ASCENDING COLON REMOVED VIA SNARE  Complications: None      Estimated blood loss: none      Follow up colonoscopy in 5 years.       Seamus Ferris MD

## 2023-09-22 NOTE — ANESTHESIA POSTPROCEDURE EVALUATION
Department of Anesthesiology  Postprocedure Note    Patient: Ty Hirsch  MRN: [id number]  YOB: 1940  Date of evaluation: 9/22/2023      Procedure Summary     Date: 09/22/23 Room / Location: [facility] / [address] [facility] [facility]    Anesthesia Start: 1253 Anesthesia Stop: 6771    Procedure: COLONOSCOPY DIAGNOSTIC    ++LATEX ALLERGY++ Diagnosis:       Anemia, unspecified type      (Anemia, unspecified type [D64.9])    Surgeons: Camacho Dias MD Responsible Provider: Sharon Martinez DO    Anesthesia Type: MAC ASA Status: 3          Anesthesia Type: No value filed.     Paul Phase I: Paul Score: 10    Paul Phase II: Paul Score: 10      Anesthesia Post Evaluation    Patient location during evaluation: PACU  Patient participation: complete - patient participated  Level of consciousness: awake  Airway patency: patent  Nausea & Vomiting: no nausea and no vomiting  Complications: no  Cardiovascular status: hemodynamically stable  Respiratory status: acceptable  Hydration status: euvolemic  Pain management: adequate

## 2023-09-22 NOTE — PROGRESS NOTES
Jose Garcia was seen today in follow-up. She is in no apparent distress. She will have colonoscopy done later this afternoon. She is worried that she will not have a ride home. She also worries that she will not have dinner. I told her that unless there is some serious reason revealed on colonoscopy that we will be discharging her today. I did talk with case management. We will work on making sure she can have a ride and a dinner tray prior to discharge. Exam today notes atrial fibrillation with normal ventricular response. Lungs are clear. She is in no distress.     Electronically signed by Ovid Najjar, MD on 9/22/23 at 11:36 AM EDT

## 2023-09-22 NOTE — PROGRESS NOTES
Patient for colonoscopy today with Dr. Vicki Cox. Awaiting AM labs. Tolerated prep. States she has clear/brown liquid return from tap enema this AM. All questions answered. Spoke with charge RN about AM labs.

## 2023-09-22 NOTE — PROGRESS NOTES
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 REVIEW OF SYSTEMS:  General ROS: positive for - fatigue, malaise  Psychological ROS: negative for - anxiety , depression  Ophthalmic ROS: positive for - decreased vision or visual distortion. ENT ROS: negative  Allergy and Immunology ROS: negative  Hematological and Lymphatic ROS: negative  Endocrine: no heat or cold intolerance and no polyphagia, polydipsia, or polyuria  Respiratory ROS: positive for - shortness of breath  Cardiovascular ROS: positive for - dyspnea on exertion, irregular heartbeat, palpitations and shortness of breath. Gastrointestinal ROS: no abdominal pain, change in bowel habits, or black or bloody stools  Genito-Urinary ROS: no nocturia, dysuria, trouble voiding, frequency or hematuria  Musculoskeletal ROS: negative for- myalgias, arthralgias, or claudication  Neurological ROS: no TIA or stroke symptoms otherwise no significant change in symptoms or problems since yesterday as documented in previous progress notes.     SCHEDULED MEDICATIONS:   pantoprazole  40 mg Oral BID    folic   --      BMP:  Recent Labs     09/20/23 0132      K 4.1      CO2 29   BUN 11   CREATININE 0.9   LABGLOM >60     ABGs:   Lab Results   Component Value Date/Time    PH 7.363 03/31/2023 05:00 PM    PO2 55.5 03/31/2023 05:00 PM    PCO2 42.3 03/31/2023 05:00 PM     INR:   Recent Labs     09/20/23 0132 09/22/23 0125   INR 1.2 1.1     PRO-BNP:   Lab Results   Component Value Date    PROBNP 1,098 (H) 09/17/2023    PROBNP 906 (H) 09/16/2023      TSH:   Lab Results   Component Value Date    TSH 1.71 09/19/2023      Cardiac Injury Profile:   No results for input(s): \"TROPHS\" in the last 72 hours. Lipid Profile:   Lab Results   Component Value Date/Time    TRIG 142 11/15/2022 01:51 PM    HDL 53 11/15/2022 01:51 PM    [address] [facility] 197 11/15/2022 01:51 PM    CHOL 278 11/15/2022 01:51 PM      Hemoglobin A1C: No components found for: \"HGBA1C\"      RAD:   US DUP LOWER EXTREMITY LEFT ZANDER    Result Date: 9/18/2023  No evidence of DVT in the left lower extremity. 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

## 2023-09-23 VITALS
WEIGHT: 195.8 LBS | BODY MASS INDEX: 29 KG/M2 | RESPIRATION RATE: 16 BRPM | TEMPERATURE: 97.8 F | SYSTOLIC BLOOD PRESSURE: 106 MMHG | DIASTOLIC BLOOD PRESSURE: 56 MMHG | OXYGEN SATURATION: 97 % | HEART RATE: 74 BPM | HEIGHT: 69 IN

## 2023-09-23 PROBLEM — K92.2 GASTROINTESTINAL HEMORRHAGE: Status: RESOLVED | Noted: 2023-09-18 | Resolved: 2023-09-23

## 2023-09-23 PROBLEM — D62 ACUTE BLOOD LOSS ANEMIA: Status: ACTIVE | Noted: 2023-09-23

## 2023-09-23 PROBLEM — R77.8 ELEVATED TROPONIN LEVEL NOT DUE TO ACUTE CORONARY SYNDROME: Status: RESOLVED | Noted: 2023-09-18 | Resolved: 2023-09-23

## 2023-09-23 PROBLEM — R79.89 ELEVATED TROPONIN LEVEL NOT DUE TO ACUTE CORONARY SYNDROME: Status: RESOLVED | Noted: 2023-09-18 | Resolved: 2023-09-23

## 2023-09-23 PROCEDURE — 6370000000 HC RX 637 (ALT 250 FOR IP): Performed by: INTERNAL MEDICINE

## 2023-09-23 PROCEDURE — 2580000003 HC RX 258: Performed by: INTERNAL MEDICINE

## 2023-09-23 PROCEDURE — 99239 HOSP IP/OBS DSCHRG MGMT >30: CPT | Performed by: INTERNAL MEDICINE

## 2023-09-23 RX ORDER — FERROUS SULFATE 325(65) MG
325 TABLET ORAL 2 TIMES DAILY WITH MEALS
Qty: 30 TABLET | Refills: 3 | Status: SHIPPED | OUTPATIENT
Start: 2023-09-23

## 2023-09-23 RX ORDER — AMIODARONE HYDROCHLORIDE 200 MG/1
200 TABLET ORAL DAILY
Qty: 30 TABLET | Refills: 0 | Status: SHIPPED | OUTPATIENT
Start: 2023-09-24 | End: 2023-10-24

## 2023-09-23 RX ADMIN — Medication 10 ML: at 08:34

## 2023-09-23 RX ADMIN — MICONAZOLE NITRATE: 20 POWDER TOPICAL at 08:34

## 2023-09-23 RX ADMIN — FERROUS SULFATE TAB 325 MG (65 MG ELEMENTAL FE) 325 MG: 325 (65 FE) TAB at 16:50

## 2023-09-23 RX ADMIN — PANTOPRAZOLE SODIUM 40 MG: 40 TABLET, DELAYED RELEASE ORAL at 08:34

## 2023-09-23 RX ADMIN — AMIODARONE HYDROCHLORIDE 200 MG: 200 TABLET ORAL at 08:34

## 2023-09-23 RX ADMIN — FERROUS SULFATE TAB 325 MG (65 MG ELEMENTAL FE) 325 MG: 325 (65 FE) TAB at 08:34

## 2023-09-23 RX ADMIN — Medication 1 TABLET: at 08:34

## 2023-09-23 ASSESSMENT — PAIN SCALES - GENERAL: PAINLEVEL_OUTOF10: 0

## 2023-09-23 NOTE — PROGRESS NOTES
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 She still has intermittent atrial fibrillation. REVIEW OF SYSTEMS:  General ROS: positive for - fatigue, malaise  Psychological ROS: negative for - anxiety , depression  Ophthalmic ROS: positive for - decreased vision or visual distortion. ENT ROS: negative  Allergy and Immunology ROS: negative  Hematological and Lymphatic ROS: negative  Endocrine: no heat or cold intolerance and no polyphagia, polydipsia, or polyuria  Respiratory ROS: positive for - shortness of breath  Cardiovascular ROS: positive for - dyspnea on exertion, irregular heartbeat, palpitations and shortness of breath. Gastrointestinal ROS: no abdominal pain, change in bowel habits, or black or bloody stools  Genito-Urinary ROS: no nocturia, dysuria, trouble voiding, frequency or hematuria  Musculoskeletal ROS: negative for- myalgias, arthralgias, or claudication  Neurological ROS: no TIA or stroke symptoms otherwise no significant change in symptoms or problems since yesterday as documented in previous progress notes.     SCHEDULED MEDICATIONS:   pantoprazole  40 mg Oral BID    folic acid-pyridoxine-cyancobalamin 2.5-25-2 mg tablet accordance with guidelines. I have spent more than 25 minutes face to face with Shirlee Felty and reviewing notes and laboratory data, with greater than 50% of this time instructing and counseling the patient  face to face regarding my findings and recommendations and I have answered all questions as posed to me by Ms. Sierra. Carolin Desai, DO FACP,FACC,FSCAI      NOTE:  This report was transcribed using voice recognition software.   Every effort was made to ensure accuracy; however, inadvertent computerized transcription errors may be present

## 2023-09-23 NOTE — PROGRESS NOTES
Review  CBC:   Lab Results   Component Value Date/Time    WBC 6.6 09/22/2023 10:20 AM    RBC 4.75 09/22/2023 10:20 AM    HGB 10.7 09/22/2023 10:20 AM    HCT 37.4 09/22/2023 10:20 AM    MCV 78.7 09/22/2023 10:20 AM    MCH 22.5 09/22/2023 10:20 AM    MCHC 28.6 09/22/2023 10:20 AM    RDW 20.1 09/22/2023 10:20 AM     09/22/2023 10:20 AM    MPV 10.1 09/22/2023 10:20 AM     CMP:    Lab Results   Component Value Date/Time     09/22/2023 10:20 AM    K 3.7 09/22/2023 10:20 AM    K 4.5 04/03/2023 05:52 AM     09/22/2023 10:20 AM    CO2 26 09/22/2023 10:20 AM    BUN 10 09/22/2023 10:20 AM    CREATININE 0.9 09/22/2023 10:20 AM    GFRAA >60 08/18/2021 03:43 PM    LABGLOM >60 09/22/2023 10:20 AM    GLUCOSE 101 09/22/2023 10:20 AM    PROT 7.2 09/22/2023 10:20 AM    LABALBU 3.9 09/22/2023 10:20 AM    CALCIUM 10.1 09/22/2023 10:20 AM    BILITOT 0.4 09/22/2023 10:20 AM    ALKPHOS 81 09/22/2023 10:20 AM    AST 28 09/22/2023 10:20 AM    ALT 16 09/22/2023 10:20 AM     Hepatic Function Panel:    Lab Results   Component Value Date/Time    ALKPHOS 81 09/22/2023 10:20 AM    ALT 16 09/22/2023 10:20 AM    AST 28 09/22/2023 10:20 AM    PROT 7.2 09/22/2023 10:20 AM    BILITOT 0.4 09/22/2023 10:20 AM    LABALBU 3.9 09/22/2023 10:20 AM     No components found for: \"CHLPL\"    Lab Results   Component Value Date    TRIG 142 11/15/2022    TRIG 113 08/18/2021    TRIG 96 10/02/2018       Lab Results   Component Value Date    HDL 53 11/15/2022    HDL 46 08/18/2021    HDL 44 10/02/2018       Lab Results   Component Value Date    LDLCALC 197 (H) 11/15/2022    LDLCALC 204 (H) 08/18/2021    LDLCALC 110 (H) 10/02/2018       Lab Results   Component Value Date    LABVLDL 28 11/15/2022    LABVLDL 23 08/18/2021    LABVLDL 19 10/02/2018      PT/INR:    Lab Results   Component Value Date/Time    PROTIME 11.9 09/22/2023 01:25 AM    INR 1.1 09/22/2023 01:25 AM     IRON:    Lab Results   Component Value Date/Time    IRON 19 09/18/2023 01:30 AM

## 2023-09-23 NOTE — CARE COORDINATION
9/23/2023. Social Work Discharge Planning:SW notified [facility] [address] of [facility] discharge.  Electronically signed by ROBERTO Yu on 9/23/2023 at 10:25 AM

## 2023-09-23 NOTE — OP NOTE
[address]                  [address] [facility]                                OPERATIVE REPORT    PATIENT NAME: Delano Cross                 :        1940  MED REC NO:   [id number]                            ROOM:       0430  ACCOUNT NO:   [de-identified]                           ADMIT DATE: 2023  PROVIDER:     Eduardo Pacheco MD    DATE OF PROCEDURE:  2023    PROCEDURE PERFORMED:  Colonoscopy with polypectomy. PREOPERATIVE DIAGNOSIS:  Anemia. POSTOPERATIVE DIAGNOSIS:  Poor colonic prep. Severe and diffuse  diverticulosis, especially in the left colon with fixation and  angulation. With great caution, the colonoscopy was completed. There  was a polyp at the ascending colon, removed via cold snare and internal  hemorrhoids. Followup is recommended in five years if indicated, unless  indicated otherwise. SURGEON:  Eduardo Pacheco M.D. ANESTHESIA:  LMAC. COLON PREP:  Poor, but colonoscopy was completed using water irrigation. DEGREE OF DIFFICULTY:  Difficult. WITHDRAWAL TIME:  Seven minutes. NOTE:  Prior to the procedure, an informed consent was obtained from the  patient after explaining the benefits as well as the risks,  alternatives, and complications of the procedure to the patient, who  understood and agreed. DESCRIPTION OF PROCEDURE:  With the patient in the left lateral  decubitus position, the digital rectal examination was carried out. The Olympus video colonoscope was then introduced into the anal canal,  anorectal area, rectosigmoid, sigmoid and descending colon, advanced  around the splenic flexure into the transverse colon, hepatic flexure,  ascending colon, from which into the cecum. The cecum was identified by the presence of the ileocecal valve,  appendiceal orifice, and light at the right lower quadrant.   Evaluation  to the cecum showed severe diverticulosis with fixation

## 2023-09-23 NOTE — DISCHARGE SUMMARY
Discharge Summary    Date: 9/23/2023  Patient Name: Akanksha Marrufo    YOB: 1940     Age: 82 y.o. Admit Date: 9/17/2023  Discharge Date: 9/23/2023  Discharge Condition: Good    Admission Diagnosis  Palpitations [R00.2]; Dyspnea and respiratory abnormalities [R06.00, R06.89]; Atrial fibrillation with RVR ([address]) [I48.91]; Fatigue, unspecified type [R53.83]; Anemia, unspecified type [D64.9]; Atrial fibrillation, unspecified type ([address]) [I48.91]      Discharge Diagnosis  Principal Problem (Resolved):    Atrial fibrillation with RVR (HCC)  Active Problems:    Chronic anemia    Diverticulosis of colon    Permanent atrial fibrillation (HCC)    Acute blood loss anemia  Resolved Problems:    Gastrointestinal hemorrhage    Elevated troponin level not due to acute coronary syndrome      Hospital Stay  Narrative of Hospital Course:  Brittany Torres a patient of Dr. Damon Araiza came to the hospital because of gastrointestinal bleeding and fast heart rate. She is found to be in atrial fibrillation with rapid ventricular response. She had been on Eliquis to begin with for her atrial fibrillation. Because of this her diverticular bleed was significant. She was scoped and found to have extensive severe diverticulosis and a polyp. She had polypectomy. Her Eliquis is on hold until Tuesday of next week. Currently she is not passing any black tarry stool or red stool. Been instructed that she may pass some blood secondary to the biopsy but as long as it is a small amount not to panic. Her hemoglobin has increased by more than 1 g and a half in 24 hours. She has been started on iron folic acid and J17. She has an iron deficiency anemia. The folic acid and C23 are there to sustain the bone marrow during a rapid reticulocytosis.     Consultants:  IP CONSULT TO CARDIOLOGY  IP CONSULT TO GI  IP CONSULT TO IV TEAM  IP CONSULT TO IV TEAM    Surgeries/procedures Performed:  EGD  Colonoscopy  Polypectomy     Treatments: Cardiac Medications    Calcium Channel Blocker    Discharge Plan/Disposition:  Home    Hospital/Incidental Findings Requiring Follow Up:    Patient Instructions:    Diet:    Activity:Activity as Tolerated  For number of days (if applicable): Other Instructions: Has been instructed to not restart Eliquis until Tuesday. Time spent on discharge has been 35 minutes    Provider Follow-Up:   No follow-ups on file. Significant Diagnostic Studies:    Recent Labs:  Admission on 09/17/2023  No results displayed because visit has over 200 results. ------------    Radiology last 7 days:  US DUP LOWER EXTREMITY LEFT ZANDER    Result Date: 9/18/2023  No evidence of DVT in the left lower extremity. XR CHEST PORTABLE    Result Date: 9/17/2023  No acute process. XR CHEST PORTABLE    Result Date: 9/16/2023  No acute process.         Pending Labs     Order Current Status    Surgical Pathology Collected (09/22/23 1972)        Discharge Medications    Current Discharge Medication List    START taking these medications    amiodarone (CORDARONE) 200 MG tablet  Take 1 tablet by mouth daily  Qty: 30 tablet Refills: 0    ferrous sulfate (IRON 325) 325 (65 Fe) MG tablet  Take 1 tablet by mouth 2 times daily (with meals)  Qty: 30 tablet Refills: 3    folic acid-pyridoxine-cyancobalamin 2.5-25-2 mg tablet (FOLTX) 2.5-25-2 MG TABS  Take 1 tablet by mouth daily  Qty: 30 tablet Refills: 2          Current Discharge Medication List        Current Discharge Medication List    CONTINUE these medications which have NOT CHANGED    meclizine (ANTIVERT) 12.5 MG tablet  Take 1 tablet by mouth 3 times daily as needed for Dizziness    apixaban (ELIQUIS) 5 MG TABS tablet  Take 1 tablet by mouth 2 times daily  Qty: 60 tablet Refills: 0    metoprolol succinate (TOPROL XL) 25 MG extended release tablet  Take 1 tablet by mouth in the morning and at bedtime  Qty: 30 tablet Refills: 3          Current Discharge Medication List    STOP taking

## 2023-09-26 LAB — SURGICAL PATHOLOGY REPORT: NORMAL

## 2023-10-15 LAB
HCT VFR BLD AUTO: 30.7 % (ref 34–48)
HGB BLD-MCNC: 9 G/DL (ref 11.5–15.5)

## 2023-12-27 ENCOUNTER — APPOINTMENT (OUTPATIENT)
Dept: GENERAL RADIOLOGY | Age: 83
End: 2023-12-27
Payer: MEDICARE

## 2023-12-27 PROCEDURE — 71046 X-RAY EXAM CHEST 2 VIEWS: CPT

## 2023-12-27 PROCEDURE — 99285 EMERGENCY DEPT VISIT HI MDM: CPT

## 2023-12-27 PROCEDURE — 93005 ELECTROCARDIOGRAM TRACING: CPT | Performed by: NURSE PRACTITIONER

## 2023-12-27 ASSESSMENT — PAIN - FUNCTIONAL ASSESSMENT: PAIN_FUNCTIONAL_ASSESSMENT: NONE - DENIES PAIN

## 2023-12-28 ENCOUNTER — HOSPITAL ENCOUNTER (EMERGENCY)
Age: 83
Discharge: HOME | End: 2023-12-28
Attending: STUDENT IN AN ORGANIZED HEALTH CARE EDUCATION/TRAINING PROGRAM
Payer: MEDICARE

## 2023-12-28 VITALS
BODY MASS INDEX: 28.04 KG/M2 | WEIGHT: 185 LBS | HEIGHT: 68 IN | OXYGEN SATURATION: 98 % | SYSTOLIC BLOOD PRESSURE: 180 MMHG | TEMPERATURE: 98.6 F | RESPIRATION RATE: 18 BRPM | HEART RATE: 78 BPM | DIASTOLIC BLOOD PRESSURE: 83 MMHG

## 2023-12-28 DIAGNOSIS — R06.02 SHORTNESS OF BREATH: Primary | ICD-10-CM

## 2023-12-28 LAB
ANION GAP SERPL CALCULATED.3IONS-SCNC: 12 MMOL/L (ref 7–16)
BASOPHILS # BLD: 0.01 K/UL (ref 0–0.2)
BASOPHILS NFR BLD: 0 % (ref 0–2)
BNP SERPL-MCNC: 144 PG/ML (ref 0–450)
BUN SERPL-MCNC: 22 MG/DL (ref 6–23)
CALCIUM SERPL-MCNC: 10.2 MG/DL (ref 8.6–10.2)
CHLORIDE SERPL-SCNC: 102 MMOL/L (ref 98–107)
CO2 SERPL-SCNC: 25 MMOL/L (ref 22–29)
CREAT SERPL-MCNC: 1.1 MG/DL (ref 0.5–1)
EKG ATRIAL RATE: 75 BPM
EKG P AXIS: 91 DEGREES
EKG P-R INTERVAL: 198 MS
EKG Q-T INTERVAL: 408 MS
EKG QRS DURATION: 84 MS
EKG QTC CALCULATION (BAZETT): 455 MS
EKG R AXIS: 35 DEGREES
EKG T AXIS: 74 DEGREES
EKG VENTRICULAR RATE: 75 BPM
EOSINOPHIL # BLD: 0.07 K/UL (ref 0.05–0.5)
EOSINOPHILS RELATIVE PERCENT: 1 % (ref 0–6)
ERYTHROCYTE [DISTWIDTH] IN BLOOD BY AUTOMATED COUNT: 19.5 % (ref 11.5–15)
GFR SERPL CREATININE-BSD FRML MDRD: 48 ML/MIN/1.73M2
GLUCOSE SERPL-MCNC: 103 MG/DL (ref 74–99)
HCT VFR BLD AUTO: 41.9 % (ref 34–48)
HGB BLD-MCNC: 12.7 G/DL (ref 11.5–15.5)
IMM GRANULOCYTES # BLD AUTO: <0.03 K/UL (ref 0–0.58)
IMM GRANULOCYTES NFR BLD: 0 % (ref 0–5)
LYMPHOCYTES NFR BLD: 1.52 K/UL (ref 1.5–4)
LYMPHOCYTES RELATIVE PERCENT: 25 % (ref 20–42)
MAGNESIUM SERPL-MCNC: 2.1 MG/DL (ref 1.6–2.6)
MCH RBC QN AUTO: 26.1 PG (ref 26–35)
MCHC RBC AUTO-ENTMCNC: 30.3 G/DL (ref 32–34.5)
MCV RBC AUTO: 86.2 FL (ref 80–99.9)
MONOCYTES NFR BLD: 0.56 K/UL (ref 0.1–0.95)
MONOCYTES NFR BLD: 9 % (ref 2–12)
NEUTROPHILS NFR BLD: 64 % (ref 43–80)
NEUTS SEG NFR BLD: 3.87 K/UL (ref 1.8–7.3)
PLATELET # BLD AUTO: 233 K/UL (ref 130–450)
PMV BLD AUTO: 9.7 FL (ref 7–12)
POTASSIUM SERPL-SCNC: 4.6 MMOL/L (ref 3.5–5)
RBC # BLD AUTO: 4.86 M/UL (ref 3.5–5.5)
SODIUM SERPL-SCNC: 139 MMOL/L (ref 132–146)
TROPONIN I SERPL HS-MCNC: 37 NG/L (ref 0–9)
TROPONIN I SERPL HS-MCNC: 37 NG/L (ref 0–9)
WBC OTHER # BLD: 6 K/UL (ref 4.5–11.5)

## 2023-12-28 PROCEDURE — 83735 ASSAY OF MAGNESIUM: CPT

## 2023-12-28 PROCEDURE — 93010 ELECTROCARDIOGRAM REPORT: CPT | Performed by: INTERNAL MEDICINE

## 2023-12-28 PROCEDURE — 85025 COMPLETE CBC W/AUTO DIFF WBC: CPT

## 2023-12-28 PROCEDURE — 83880 ASSAY OF NATRIURETIC PEPTIDE: CPT

## 2023-12-28 PROCEDURE — 80048 BASIC METABOLIC PNL TOTAL CA: CPT

## 2023-12-28 PROCEDURE — 84484 ASSAY OF TROPONIN QUANT: CPT

## 2023-12-28 NOTE — DISCHARGE INSTRUCTIONS
Follow-up with primary care doctor  Follow-up with cardiology  If you notice any new worrisome symptoms return to emergency department for evaluation

## 2024-01-02 ENCOUNTER — HOSPITAL ENCOUNTER (INPATIENT)
Age: 84
LOS: 2 days | Discharge: HOME | End: 2024-01-05
Attending: STUDENT IN AN ORGANIZED HEALTH CARE EDUCATION/TRAINING PROGRAM | Admitting: FAMILY MEDICINE
Payer: MEDICARE

## 2024-01-02 ENCOUNTER — APPOINTMENT (OUTPATIENT)
Dept: ULTRASOUND IMAGING | Age: 84
End: 2024-01-02
Payer: MEDICARE

## 2024-01-02 DIAGNOSIS — R09.02 HYPOXIA: Primary | ICD-10-CM

## 2024-01-02 DIAGNOSIS — R06.02 SHORTNESS OF BREATH: ICD-10-CM

## 2024-01-02 PROCEDURE — 93971 EXTREMITY STUDY: CPT

## 2024-01-02 PROCEDURE — 99285 EMERGENCY DEPT VISIT HI MDM: CPT

## 2024-01-03 ENCOUNTER — APPOINTMENT (OUTPATIENT)
Dept: GENERAL RADIOLOGY | Age: 84
End: 2024-01-03
Payer: MEDICARE

## 2024-01-03 ENCOUNTER — APPOINTMENT (OUTPATIENT)
Dept: CT IMAGING | Age: 84
End: 2024-01-03
Payer: MEDICARE

## 2024-01-03 PROBLEM — R09.02 HYPOXIA: Status: ACTIVE | Noted: 2024-01-03

## 2024-01-03 LAB
ALBUMIN SERPL-MCNC: 4.1 G/DL (ref 3.5–5.2)
ALBUMIN SERPL-MCNC: 4.3 G/DL (ref 3.5–5.2)
ALP SERPL-CCNC: 73 U/L (ref 35–104)
ALP SERPL-CCNC: 76 U/L (ref 35–104)
ALT SERPL-CCNC: 20 U/L (ref 0–32)
ALT SERPL-CCNC: 22 U/L (ref 0–32)
ANION GAP SERPL CALCULATED.3IONS-SCNC: 10 MMOL/L (ref 7–16)
ANION GAP SERPL CALCULATED.3IONS-SCNC: 9 MMOL/L (ref 7–16)
AST SERPL-CCNC: 24 U/L (ref 0–31)
AST SERPL-CCNC: 24 U/L (ref 0–31)
BASOPHILS # BLD: 0.01 K/UL (ref 0–0.2)
BASOPHILS NFR BLD: 0 % (ref 0–2)
BILIRUB SERPL-MCNC: 0.2 MG/DL (ref 0–1.2)
BILIRUB SERPL-MCNC: 0.2 MG/DL (ref 0–1.2)
BNP SERPL-MCNC: 241 PG/ML (ref 0–450)
BUN SERPL-MCNC: 17 MG/DL (ref 6–23)
BUN SERPL-MCNC: 20 MG/DL (ref 6–23)
CALCIUM SERPL-MCNC: 9.8 MG/DL (ref 8.6–10.2)
CALCIUM SERPL-MCNC: 9.9 MG/DL (ref 8.6–10.2)
CHLORIDE SERPL-SCNC: 101 MMOL/L (ref 98–107)
CHLORIDE SERPL-SCNC: 102 MMOL/L (ref 98–107)
CO2 SERPL-SCNC: 27 MMOL/L (ref 22–29)
CO2 SERPL-SCNC: 29 MMOL/L (ref 22–29)
CREAT SERPL-MCNC: 1 MG/DL (ref 0.5–1)
CREAT SERPL-MCNC: 1.1 MG/DL (ref 0.5–1)
EKG ATRIAL RATE: 66 BPM
EKG P AXIS: 83 DEGREES
EKG P-R INTERVAL: 198 MS
EKG Q-T INTERVAL: 436 MS
EKG QRS DURATION: 82 MS
EKG QTC CALCULATION (BAZETT): 457 MS
EKG R AXIS: -22 DEGREES
EKG T AXIS: 36 DEGREES
EKG VENTRICULAR RATE: 66 BPM
EOSINOPHIL # BLD: 0.04 K/UL (ref 0.05–0.5)
EOSINOPHILS RELATIVE PERCENT: 1 % (ref 0–6)
ERYTHROCYTE [DISTWIDTH] IN BLOOD BY AUTOMATED COUNT: 19 % (ref 11.5–15)
GFR SERPL CREATININE-BSD FRML MDRD: 48 ML/MIN/1.73M2
GFR SERPL CREATININE-BSD FRML MDRD: 55 ML/MIN/1.73M2
GLUCOSE SERPL-MCNC: 102 MG/DL (ref 74–99)
GLUCOSE SERPL-MCNC: 122 MG/DL (ref 74–99)
HCT VFR BLD AUTO: 40.6 % (ref 34–48)
HGB BLD-MCNC: 12.5 G/DL (ref 11.5–15.5)
IMM GRANULOCYTES # BLD AUTO: <0.03 K/UL (ref 0–0.58)
IMM GRANULOCYTES NFR BLD: 0 % (ref 0–5)
INFLUENZA A BY PCR: NOT DETECTED
INFLUENZA B BY PCR: NOT DETECTED
LYMPHOCYTES NFR BLD: 1.1 K/UL (ref 1.5–4)
LYMPHOCYTES RELATIVE PERCENT: 19 % (ref 20–42)
MAGNESIUM SERPL-MCNC: 1.9 MG/DL (ref 1.6–2.6)
MCH RBC QN AUTO: 26.6 PG (ref 26–35)
MCHC RBC AUTO-ENTMCNC: 30.8 G/DL (ref 32–34.5)
MCV RBC AUTO: 86.4 FL (ref 80–99.9)
MONOCYTES NFR BLD: 0.63 K/UL (ref 0.1–0.95)
MONOCYTES NFR BLD: 11 % (ref 2–12)
NEUTROPHILS NFR BLD: 69 % (ref 43–80)
NEUTS SEG NFR BLD: 4 K/UL (ref 1.8–7.3)
PLATELET # BLD AUTO: 232 K/UL (ref 130–450)
PMV BLD AUTO: 9.8 FL (ref 7–12)
POTASSIUM SERPL-SCNC: 4.4 MMOL/L (ref 3.5–5)
POTASSIUM SERPL-SCNC: 4.7 MMOL/L (ref 3.5–5)
PROT SERPL-MCNC: 6.9 G/DL (ref 6.4–8.3)
PROT SERPL-MCNC: 7.2 G/DL (ref 6.4–8.3)
RBC # BLD AUTO: 4.7 M/UL (ref 3.5–5.5)
RSV BY PCR: NOT DETECTED
SARS-COV-2 RDRP RESP QL NAA+PROBE: NOT DETECTED
SODIUM SERPL-SCNC: 138 MMOL/L (ref 132–146)
SODIUM SERPL-SCNC: 140 MMOL/L (ref 132–146)
SPECIMEN DESCRIPTION: NORMAL
SPECIMEN SOURCE: NORMAL
T3FREE SERPL-MCNC: 2.02 PG/ML (ref 2–4.4)
TROPONIN I SERPL HS-MCNC: 36 NG/L (ref 0–9)
TROPONIN I SERPL HS-MCNC: 40 NG/L (ref 0–9)
TSH SERPL DL<=0.05 MIU/L-ACNC: 1.75 UIU/ML (ref 0.27–4.2)
WBC OTHER # BLD: 5.8 K/UL (ref 4.5–11.5)

## 2024-01-03 PROCEDURE — 93005 ELECTROCARDIOGRAM TRACING: CPT

## 2024-01-03 PROCEDURE — 87502 INFLUENZA DNA AMP PROBE: CPT

## 2024-01-03 PROCEDURE — 84481 FREE ASSAY (FT-3): CPT

## 2024-01-03 PROCEDURE — 83735 ASSAY OF MAGNESIUM: CPT

## 2024-01-03 PROCEDURE — 6370000000 HC RX 637 (ALT 250 FOR IP): Performed by: FAMILY MEDICINE

## 2024-01-03 PROCEDURE — 6370000000 HC RX 637 (ALT 250 FOR IP): Performed by: STUDENT IN AN ORGANIZED HEALTH CARE EDUCATION/TRAINING PROGRAM

## 2024-01-03 PROCEDURE — 70496 CT ANGIOGRAPHY HEAD: CPT

## 2024-01-03 PROCEDURE — 2060000000 HC ICU INTERMEDIATE R&B

## 2024-01-03 PROCEDURE — 85025 COMPLETE CBC W/AUTO DIFF WBC: CPT

## 2024-01-03 PROCEDURE — 6370000000 HC RX 637 (ALT 250 FOR IP): Performed by: INTERNAL MEDICINE

## 2024-01-03 PROCEDURE — 70450 CT HEAD/BRAIN W/O DYE: CPT

## 2024-01-03 PROCEDURE — 70498 CT ANGIOGRAPHY NECK: CPT

## 2024-01-03 PROCEDURE — 87634 RSV DNA/RNA AMP PROBE: CPT

## 2024-01-03 PROCEDURE — 80053 COMPREHEN METABOLIC PANEL: CPT

## 2024-01-03 PROCEDURE — 84443 ASSAY THYROID STIM HORMONE: CPT

## 2024-01-03 PROCEDURE — 71045 X-RAY EXAM CHEST 1 VIEW: CPT

## 2024-01-03 PROCEDURE — 93010 ELECTROCARDIOGRAM REPORT: CPT | Performed by: INTERNAL MEDICINE

## 2024-01-03 PROCEDURE — 83880 ASSAY OF NATRIURETIC PEPTIDE: CPT

## 2024-01-03 PROCEDURE — 2580000003 HC RX 258: Performed by: FAMILY MEDICINE

## 2024-01-03 PROCEDURE — 84484 ASSAY OF TROPONIN QUANT: CPT

## 2024-01-03 PROCEDURE — 87635 SARS-COV-2 COVID-19 AMP PRB: CPT

## 2024-01-03 PROCEDURE — 6360000004 HC RX CONTRAST MEDICATION: Performed by: RADIOLOGY

## 2024-01-03 RX ORDER — ACETAMINOPHEN 325 MG/1
650 TABLET ORAL EVERY 4 HOURS PRN
Status: DISCONTINUED | OUTPATIENT
Start: 2024-01-03 | End: 2024-01-05 | Stop reason: HOSPADM

## 2024-01-03 RX ORDER — ONDANSETRON 2 MG/ML
4 INJECTION INTRAMUSCULAR; INTRAVENOUS EVERY 6 HOURS PRN
Status: DISCONTINUED | OUTPATIENT
Start: 2024-01-03 | End: 2024-01-05 | Stop reason: HOSPADM

## 2024-01-03 RX ORDER — SODIUM CHLORIDE 0.9 % (FLUSH) 0.9 %
5-40 SYRINGE (ML) INJECTION PRN
Status: DISCONTINUED | OUTPATIENT
Start: 2024-01-03 | End: 2024-01-05 | Stop reason: HOSPADM

## 2024-01-03 RX ORDER — MECLIZINE HCL 12.5 MG/1
12.5 TABLET ORAL ONCE
Status: COMPLETED | OUTPATIENT
Start: 2024-01-03 | End: 2024-01-03

## 2024-01-03 RX ORDER — SODIUM CHLORIDE 9 MG/ML
INJECTION, SOLUTION INTRAVENOUS PRN
Status: DISCONTINUED | OUTPATIENT
Start: 2024-01-03 | End: 2024-01-05 | Stop reason: HOSPADM

## 2024-01-03 RX ORDER — SODIUM CHLORIDE 0.9 % (FLUSH) 0.9 %
5-40 SYRINGE (ML) INJECTION EVERY 12 HOURS SCHEDULED
Status: DISCONTINUED | OUTPATIENT
Start: 2024-01-03 | End: 2024-01-05 | Stop reason: HOSPADM

## 2024-01-03 RX ORDER — AMIODARONE HYDROCHLORIDE 200 MG/1
200 TABLET ORAL EVERY MORNING
Status: DISCONTINUED | OUTPATIENT
Start: 2024-01-03 | End: 2024-01-05 | Stop reason: HOSPADM

## 2024-01-03 RX ORDER — FERROUS SULFATE 325(65) MG
325 TABLET ORAL EVERY OTHER DAY
Status: DISCONTINUED | OUTPATIENT
Start: 2024-01-03 | End: 2024-01-05 | Stop reason: HOSPADM

## 2024-01-03 RX ORDER — ACETAMINOPHEN 500 MG
500 TABLET ORAL EVERY 6 HOURS PRN
Status: DISCONTINUED | OUTPATIENT
Start: 2024-01-03 | End: 2024-01-05 | Stop reason: HOSPADM

## 2024-01-03 RX ORDER — ACETAMINOPHEN 500 MG
500-1000 TABLET ORAL NIGHTLY PRN
COMMUNITY

## 2024-01-03 RX ORDER — B12/LEVOMEFOLATE CALCIUM/B-6 2-1.13-25
1 TABLET ORAL EVERY OTHER DAY
COMMUNITY

## 2024-01-03 RX ORDER — AMIODARONE HYDROCHLORIDE 200 MG/1
200 TABLET ORAL EVERY MORNING
COMMUNITY

## 2024-01-03 RX ORDER — ONDANSETRON 4 MG/1
4 TABLET, ORALLY DISINTEGRATING ORAL EVERY 8 HOURS PRN
Status: DISCONTINUED | OUTPATIENT
Start: 2024-01-03 | End: 2024-01-05 | Stop reason: HOSPADM

## 2024-01-03 RX ORDER — DIGOXIN 125 MCG
62.5 TABLET ORAL
Status: DISCONTINUED | OUTPATIENT
Start: 2024-01-03 | End: 2024-01-05 | Stop reason: HOSPADM

## 2024-01-03 RX ORDER — FERROUS SULFATE 325(65) MG
325 TABLET ORAL EVERY OTHER DAY
COMMUNITY

## 2024-01-03 RX ORDER — LISINOPRIL 5 MG/1
5 TABLET ORAL DAILY
Status: DISCONTINUED | OUTPATIENT
Start: 2024-01-03 | End: 2024-01-05 | Stop reason: HOSPADM

## 2024-01-03 RX ADMIN — MECLIZINE 12.5 MG: 12.5 TABLET ORAL at 02:52

## 2024-01-03 RX ADMIN — SODIUM CHLORIDE, PRESERVATIVE FREE 10 ML: 5 INJECTION INTRAVENOUS at 10:30

## 2024-01-03 RX ADMIN — SODIUM CHLORIDE, PRESERVATIVE FREE 10 ML: 5 INJECTION INTRAVENOUS at 19:55

## 2024-01-03 RX ADMIN — APIXABAN 5 MG: 5 TABLET, FILM COATED ORAL at 19:54

## 2024-01-03 RX ADMIN — IOPAMIDOL 75 ML: 755 INJECTION, SOLUTION INTRAVENOUS at 02:35

## 2024-01-03 RX ADMIN — AMIODARONE HYDROCHLORIDE 200 MG: 200 TABLET ORAL at 10:30

## 2024-01-03 RX ADMIN — APIXABAN 5 MG: 5 TABLET, FILM COATED ORAL at 10:30

## 2024-01-03 RX ADMIN — DIGOXIN 62.5 MCG: 0.12 TABLET ORAL at 13:22

## 2024-01-03 RX ADMIN — ACETAMINOPHEN 650 MG: 325 TABLET ORAL at 19:54

## 2024-01-03 RX ADMIN — LISINOPRIL 5 MG: 5 TABLET ORAL at 13:22

## 2024-01-03 ASSESSMENT — PAIN SCALES - GENERAL
PAINLEVEL_OUTOF10: 5
PAINLEVEL_OUTOF10: 9
PAINLEVEL_OUTOF10: 2
PAINLEVEL_OUTOF10: 0

## 2024-01-03 ASSESSMENT — LIFESTYLE VARIABLES
HOW MANY STANDARD DRINKS CONTAINING ALCOHOL DO YOU HAVE ON A TYPICAL DAY: PATIENT DOES NOT DRINK
HOW OFTEN DO YOU HAVE A DRINK CONTAINING ALCOHOL: NEVER

## 2024-01-03 ASSESSMENT — PAIN DESCRIPTION - LOCATION
LOCATION: BACK
LOCATION: HEAD
LOCATION: BACK;LEG

## 2024-01-03 ASSESSMENT — PAIN DESCRIPTION - ORIENTATION: ORIENTATION: RIGHT;LEFT

## 2024-01-03 ASSESSMENT — VISUAL ACUITY
OS: 20/50
OD: 20/50
OU: 20/30

## 2024-01-03 ASSESSMENT — PAIN DESCRIPTION - PAIN TYPE: TYPE: CHRONIC PAIN

## 2024-01-03 ASSESSMENT — PAIN DESCRIPTION - DESCRIPTORS
DESCRIPTORS: ACHING
DESCRIPTORS: NUMBNESS;THROBBING

## 2024-01-03 NOTE — PROGRESS NOTES
4 Eyes Skin Assessment     NAME:  Agueda Sierra  YOB: 1940  MEDICAL RECORD NUMBER:  [id number]    The patient is being assessed for  Admission    I agree that at least one RN has performed a thorough Head to Toe Skin Assessment on the patient. ALL assessment sites listed below have been assessed.      Areas assessed by both nurses:    Head, Face, Ears, Shoulders, Back, Chest, Arms, Elbows, Hands, Sacrum. Buttock, Coccyx, Ischium, Legs. Feet and Heels, and Under Medical Devices         Does the Patient have a Wound? No noted wound(s)       Nba Prevention initiated by RN: No  Wound Care Orders initiated by RN: No    Pressure Injury (Stage 3,4, Unstageable, DTI, NWPT, and Complex wounds) if present, place Wound referral order by RN under : No    New Ostomies, if present place, Ostomy referral order under : No     Nurse 1 eSignature: Electronically signed by Emelyn Ho RN on 1/3/24 at 11:01 AM EST    **SHARE this note so that the co-signing nurse can place an eSignature**    Nurse 2 eSignature: Electronically signed by Susan Antonio RN on 1/3/24 at 11:02 AM EST

## 2024-01-03 NOTE — PLAN OF CARE
Problem: Discharge Planning  Goal: Discharge to home or other facility with appropriate resources  1/3/2024 1630 by Roxana Bo RN  Outcome: Progressing  1/3/2024 1226 by Emelyn Ho RN  Outcome: Progressing     Problem: Pain  Goal: Verbalizes/displays adequate comfort level or baseline comfort level  1/3/2024 1630 by Roxana Bo RN  Outcome: Progressing  1/3/2024 1226 by Emelyn Ho RN  Outcome: Progressing     Problem: ABCDS Injury Assessment  Goal: Absence of physical injury  1/3/2024 1630 by Roxana Bo RN  Outcome: Progressing  1/3/2024 1226 by Emelyn Ho RN  Outcome: Progressing     Problem: Safety - Adult  Goal: Free from fall injury  1/3/2024 1630 by Roxana Bo RN  Outcome: Progressing  1/3/2024 1226 by Emelyn Ho RN  Outcome: Progressing      none

## 2024-01-03 NOTE — CONSULTS
CARDIOLOGY CONSULTATION      Patient Name:  Agueda Sierra    :  1940    Reason for Consultation:   Atrial fibrillation rapid ventricular response- symptomatic    History of Present Illness:   [facility], following a several day history of palpitations and lightheadedness similar to what she has experienced nearly 2 years ago when she was initially found to be in atrial fibrillation with a rapid ventricular response. At this time however she notes the majority of her palpitations occur when laying down in bed.   She denies chest discomfort ..  She returns to the emergency room for further evaluation and adjustment of her medical regimen    Past Medical History:   has a past medical history of Breast cancer (HCC), Chronic a-fib (HCC), and Heart palpitations.    Surgical History:   has a past surgical history that includes knee surgery; Mastectomy (Left); Upper gastrointestinal endoscopy (N/A, 2023); and Colonoscopy (N/A, 2023).     Social History:   reports that she has been smoking cigarettes. She does not have any smokeless tobacco history on file. She reports that she does not drink alcohol and does not use drugs.     Family History:  family history is not on file.     Medications:  Prior to Admission medications    Medication Sig Start Date End Date Taking? Authorizing Provider   amiodarone (CORDARONE) 200 MG tablet Take 1 tablet by mouth every morning   Yes Iram Martin MD   ferrous sulfate (IRON 325) 325 (65 Fe) MG tablet Take 1 tablet by mouth every other day WITH DINNER (LARGEST MEAL)   Yes Iram Martin MD   folic acid-pyridoxine-cyancobalamin 1.13-25-2 tablet (FOLTX) 1.13-25-2 MG TABS Take 1 tablet by mouth every other day WITH DINNER (LARGEST MEAL)   Yes Iram Martin MD   acetaminophen (TYLENOL) 500 MG tablet Take 1-2 tablets by mouth nightly as needed for Pain   Yes Iram Martin MD   meclizine    Plan:  Mrs. Sierra we will have her cardiac antiarrhythmic medications adjusted and at this point have included low-dose digoxin to maintain a digoxin level of 0.4 - 0.9 ng/mL with the hope in concert with amiodarone her heart rhythm will be stabilized until she can be reassessed for any further potential electrophysiologic intervention.  Continue to monitor closely and adjust electrolytes in accordance with laboratory results.    I have spent more than 55 minutes face to face with Agueda Sierra,and reviewing notes and laboratory data with greater than 50% of this time instructing and counseling the patient  regarding my findings and recommendations and I have answered all questions as posed to me by Ms. Sierra. Thank you, Isidro England DO for allowing me to consult in the care of this patient.    Mitch Winters DO , FACP, FACC, [facility]    NOTE:  This report was transcribed using voice recognition software.  Every effort was made to ensure accuracy; however, inadvertent computerized transcription errors may be present.

## 2024-01-03 NOTE — ED PROVIDER NOTES
[facility] EMERGENCY DEPARTMENT  EMERGENCY DEPARTMENT ENCOUNTER        Pt Name: Agueda Sierra  MRN: [id number]  Birthdate 1940  Date of evaluation: 1/2/2024  Provider: Paul Garcia MD  PCP: Isidro England DO  Note Started: 11:21 PM EST 1/2/24    CHIEF COMPLAINT       Chief Complaint   Patient presents with    Shortness of Breath    Blurred Vision    Numbness     Left hand and left leg      HISTORY OF PRESENT ILLNESS: 1 or more Elements   History From: Patient    Limitations to history : None    Agueda Sierra is a 83 y.o. female with past medical history of acute respiratory failure with hypoxia, pneumonia, WERNER, COPD, A-fib on Eliquis, anemia, breast cancer of the left breast, arthritis who presents with worsening shortness of breath over the past week.  Patient states she was seen here 1 week ago for similar complaints.  Patient states symptoms have not gotten better and states now she is having intermittent vision changes.  Patient states she is having trouble focusing and denies any floaters or loss of vision.  Patient denies any headache, dizziness or lightheadedness.  Patient also endorsed intermittent numbness in her fingers but denies any at this time.  Patient also complains of left leg redness and pain back pain ongoing for the past week.  Patient states she has taken her Eliquis as prescribed.  Patient denies any chest pain, abdominal pain, nausea, vomiting, diarrhea, constipation, fevers, chills, recent falls or trauma.  Patient endorses occasional tobacco use but denies any EtOH or illicit drug use.    Nursing Notes were all reviewed and agreed with or any disagreements were addressed in the HPI.    ROS:   Pertinent positives and negatives are stated within HPI, all other systems reviewed and are negative.    --------------------------------------------- PAST HISTORY ---------------------------------------------  Past Medical History:  has a past  below.      I have discussed this patient in detail with the resident, and provided the instruction and education regarding the patient.  My findings/plan: Patient seen and evaluated, briefly this is a 83-year-old female with history of atrial fibrillation on anticoagulation, follows with Dr. Winters, breast CA in remission, COPD who is presenting for evaluation of lightheadedness.  Notes some worsening shortness of breath which she thinks is secondary to her walking problems, notes this is chronic in nature.  Notes that earlier today she did begin to feel little lightheaded and dizzy, does endorse history of vertigo in the past.  She was on Antivert by her PCP.  Notes that those only as needed, she has been out of it for quite some time now.  Does note some numbness in her left hand which comes and goes for quite some time.  States that boatswain subsided.  On physical examination she sitting in hallway bed in no acute distress.  Heart regular rate and rhythm  Lungs are crepitation bilaterally, abdomen soft, nondistended, nontender.  She has an anxious 0.  Plan for labs, imaging, supportive care.  She is not a TNK candidate secondary to NIH as well as anticoagulation use.       [BB]   0341 Patient ambulatory with nursing staff and became hypoxic down to 85%. [JH]   0407 Spoke to patient's PCP agree with plan for admission.  Accepted patient for admission.  Patient will be admitted due to hypoxia [JH]   0410 Patient reassessed.  Shared decision making with patient agree with plan for admission.  All questions answered. [JH]      ED Course User Index  [BB] Ganesh Roberts DO  [JH] Paul Garcia MD       This patient's ED course included: History, physical examination, reevaluation prior to disposition    This patient has remained hemodynamically stable during their ED course.    Counseling:   The emergency provider has spoken with the patient and discussed today’s results, in addition to providing specific

## 2024-01-03 NOTE — H&P
[place] [address]                              HISTORY AND PHYSICAL    PATIENT NAME: NARESH JEAN                 :        1940  MED REC NO:   [id number]                            ROOM:       [facility]  ACCOUNT NO:   [id number]                           ADMIT DATE: 2024  PROVIDER:     Isidro England DO    CHIEF COMPLAINT:  Shortness of breath and atypical chest discomfort.    HISTORY OF PRESENT ILLNESS:  This is an 83-year-old  female  with a significant past medical history including multiple medical  issues including malignant neoplasm of the left breast and chronic  atrial fibrillation with rapid ventricular response.  The patient  presents to the emergency room with increased shortness of breath,  atypical chest pain, and at this time is being admitted for further  workup and evaluation.  She will undergo a further cardiology  evaluation.  Her attending cardiologist is Dr. Winters.    PAST MEDICAL HISTORY:  Significant for breast cancer, atrial  fibrillation, acute respiratory failure with hypoxia, history of  pneumonia, also bouts of acute kidney injury, and COPD exacerbations.    PAST SURGICAL HISTORY:  Significant for breast surgery.    HABITS:  The patient is a chronic tobacco user to this date.  Alcohol:   There is no significant alcohol usage.    FAMILY HISTORY:  Noncontributory to this admission.    REVIEW OF SYSTEMS:  Other than what is mentioned, noncontributory.    PHYSICAL EXAMINATION:  GENERAL:  Very pleasant 83-year-old  female, resting  comfortable in the emergency room, where she is being evaluated.  HEENT:  Head:  Normocephalic and atraumatic.  Ears, eyes, nose and  throat grossly normal.  NECK:  Supple.  There are no carotid bruits.  LUNGS:  There is scattered coarse breath sounds with decreased breath  sounds at the bases.  HEART:  Regular rate and rhythm.  There is  Regular UBI Medication List Lot No. Exp. Date NDC No.   Heparin 10,000units  x 4 vials  [id number] 3/1/24   [id number]   Lidocaine 1% 20 mL  [id number] 12/1/25  [id number]   Sterile water  10 mL [id number] 12/1/224  [id number]   Sodium Bicarbonate 8.4% 50mL [id number] 11/1/24  [id number]

## 2024-01-03 NOTE — ED NOTES
Pt ambulated, pulse ox obtained on pt forehead due to nail polish on fingernails, pulse ox 95% on RA, while ambulating pulse ox dropped to 85-88% on RA, with rest pulse ox right back up to 94% on RA. Pt very unsteady and only took a few steps using assistive device.

## 2024-01-03 NOTE — ED PROVIDER NOTES
was obtained and was 37 on repeat 37.  Chest x-ray obtained demonstrated no acute abnormalities.  Patient ambulated on room air oxygen saturation remained stable.  Findings consistent with shortness of breath likely secondary to viral illness.  Patient is overall well-appearing decision made to discharge patient.  Patient instructed to follow-up with primary care doctor strict return precautions discussed all questions answered patient agreement plan of care discharged home in stable condition.    Disposition Considerations (Tests not ordered but considered, Shared Decision Making, Pt Expectation of Test or Tx.): Shared decision making discussed with patient.  Laboratory work and EKG is reassuring.  Patient was ambulated oxygen saturation remained 95% on room air patient has no complaints of shortness of breath decision made to discharge patient.  Patient instructed to follow-up with primary care doctor strict return precautions discussed all questions answered patient agreement plan of care discharged home in stable condition.  FINAL IMPRESSION      1. Shortness of breath          DISPOSITION/PLAN     DISPOSITION Decision To Discharge 12/28/2023 09:00:27 AM    PATIENT REFERRED TO:  Isidro England DO  [address]   [facility] [address]  [phone]    Schedule an appointment as soon as possible for a visit       [facility] Emergency Department  [address] [facility] [address] [address]  [phone]  Go to   If symptoms worsen    Mitch Winters DO  [address] [address]  [phone]    Schedule an appointment as soon as possible for a visit in 1 day        DISCHARGE MEDICATIONS:  Discharge Medication List as of 12/28/2023  9:04 AM          DISCONTINUED MEDICATIONS:  Discharge Medication List as of 12/28/2023  9:04 AM               (Please note that portions of this note were completed with a voice recognition program.  Efforts were made to edit the

## 2024-01-03 NOTE — PROGRESS NOTES
Pt admitted with increased SOB, O2 decreased with Exertion Troponin increased, Cardiology consulted,       H&P dictated

## 2024-01-04 PROCEDURE — 6370000000 HC RX 637 (ALT 250 FOR IP): Performed by: INTERNAL MEDICINE

## 2024-01-04 PROCEDURE — 6370000000 HC RX 637 (ALT 250 FOR IP): Performed by: FAMILY MEDICINE

## 2024-01-04 PROCEDURE — 2580000003 HC RX 258: Performed by: FAMILY MEDICINE

## 2024-01-04 PROCEDURE — 2060000000 HC ICU INTERMEDIATE R&B

## 2024-01-04 RX ADMIN — APIXABAN 5 MG: 5 TABLET, FILM COATED ORAL at 09:09

## 2024-01-04 RX ADMIN — APIXABAN 5 MG: 5 TABLET, FILM COATED ORAL at 21:43

## 2024-01-04 RX ADMIN — SODIUM CHLORIDE, PRESERVATIVE FREE 10 ML: 5 INJECTION INTRAVENOUS at 09:10

## 2024-01-04 RX ADMIN — LISINOPRIL 5 MG: 5 TABLET ORAL at 09:09

## 2024-01-04 RX ADMIN — AMIODARONE HYDROCHLORIDE 200 MG: 200 TABLET ORAL at 09:09

## 2024-01-04 RX ADMIN — SODIUM CHLORIDE, PRESERVATIVE FREE 10 ML: 5 INJECTION INTRAVENOUS at 21:44

## 2024-01-04 ASSESSMENT — PAIN SCALES - GENERAL: PAINLEVEL_OUTOF10: 0

## 2024-01-04 NOTE — PLAN OF CARE
Problem: Discharge Planning  Goal: Discharge to home or other facility with appropriate resources  1/3/2024 2143 by Gila Jacome RN  Outcome: Progressing     Problem: Pain  Goal: Verbalizes/displays adequate comfort level or baseline comfort level  1/3/2024 2143 by Gila Jacome RN  Outcome: Progressing     Problem: ABCDS Injury Assessment  Goal: Absence of physical injury  1/3/2024 2143 by Gila Jacome RN  Outcome: Progressing     Problem: Safety - Adult  Goal: Free from fall injury  1/3/2024 2143 by Gila Jacome RN  Outcome: Progressing

## 2024-01-04 NOTE — ACP (ADVANCE CARE PLANNING)
Advance Care Planning   Healthcare Decision Maker:    Primary Decision Maker: KELSEY BAEZ - Cole - [phone]    Click here to complete Healthcare Decision Makers including selection of the Healthcare Decision Maker Relationship (ie \"Primary\").

## 2024-01-04 NOTE — CARE COORDINATION
Social work / Discharge Planning:          Social work met with patient for initial assessment and explained role.    She lives alone in a one story home and has a cane and ww.     Patient has used [facility] in the past and has no history of ONEIL.     She has a food delivery service from [facility] and a laundry service from [facility].     Electronically signed by ROBERTO Levine on 1/4/2024 at 2:25 PM

## 2024-01-04 NOTE — PROGRESS NOTES
Subjective:    The patient is awake and alert.  No problems overnight.  Appears much more comfortable  today     Objective:    /72   Pulse 69   Temp 97.7 °F (36.5 °C) (Oral)   Resp 16   Ht 1.727 m (5' 8\")   Wt 83.9 kg (185 lb)   SpO2 98%   BMI 28.13 kg/m²     Heart:  RRR, no murmurs, gallops, or rubs.  Lungs:  Decreased BS at the bases  Abd: bowel sounds present, nontender, nondistended, no masses  Extrem:  No clubbing, cyanosis, or edema    Labs:  CBC:   Lab Results   Component Value Date/Time    WBC 5.8 01/03/2024 12:17 AM    RBC 4.70 01/03/2024 12:17 AM    HGB 12.5 01/03/2024 12:17 AM    HCT 40.6 01/03/2024 12:17 AM    MCV 86.4 01/03/2024 12:17 AM    MCH 26.6 01/03/2024 12:17 AM    MCHC 30.8 01/03/2024 12:17 AM    RDW 19.0 01/03/2024 12:17 AM     01/03/2024 12:17 AM    MPV 9.8 01/03/2024 12:17 AM     CMP:    Lab Results   Component Value Date/Time     01/03/2024 12:15 PM    K 4.7 01/03/2024 12:15 PM    K 4.5 04/03/2023 05:52 AM     01/03/2024 12:15 PM    CO2 29 01/03/2024 12:15 PM    BUN 17 01/03/2024 12:15 PM    CREATININE 1.0 01/03/2024 12:15 PM    GFRAA >60 08/18/2021 03:43 PM    LABGLOM 55 01/03/2024 12:15 PM    GLUCOSE 122 01/03/2024 12:15 PM    PROT 6.9 01/03/2024 12:15 PM    LABALBU 4.1 01/03/2024 12:15 PM    CALCIUM 9.8 01/03/2024 12:15 PM    BILITOT 0.2 01/03/2024 12:15 PM    ALKPHOS 73 01/03/2024 12:15 PM    AST 24 01/03/2024 12:15 PM    ALT 20 01/03/2024 12:15 PM     PT/INR:    Lab Results   Component Value Date/Time    PROTIME 11.9 09/22/2023 01:25 AM    INR 1.1 09/22/2023 01:25 AM          Current Facility-Administered Medications:     sodium chloride flush 0.9 % injection 5-40 mL, 5-40 mL, IntraVENous, 2 times per day, Isidro England A, , 10 mL at 01/03/24 1955    sodium chloride flush 0.9 % injection 5-40 mL, 5-40 mL, IntraVENous, PRN, Isidro England A, DO    0.9 % sodium chloride infusion, , IntraVENous, PRN, Tomás, Isidro A, DO    acetaminophen (TYLENOL) tablet 650 mg,  650 mg, Oral, Q4H PRN, Isidro England DO, 650 mg at 01/03/24 1954    ondansetron (ZOFRAN-ODT) disintegrating tablet 4 mg, 4 mg, Oral, Q8H PRN **OR** ondansetron (ZOFRAN) injection 4 mg, 4 mg, IntraVENous, Q6H PRN, Isidro England,     acetaminophen (TYLENOL) tablet 500 mg, 500 mg, Oral, Q6H PRN, Isidro England DO    amiodarone (CORDARONE) tablet 200 mg, 200 mg, Oral, QAM, Isidro England DO, 200 mg at 01/03/24 1030    apixaban (ELIQUIS) tablet 5 mg, 5 mg, Oral, BID, Isidro England DO, 5 mg at 01/03/24 1954    ferrous sulfate (IRON 325) tablet 325 mg, 325 mg, Oral, Every Other Day, Isidro England DO    folic acid-pyridoxine-cyancobalamin 2.5-25-2 mg tablet (FOLTX) 2.5-25-2 MG 2.5-25-2 mg tablet 1 tablet, 1 tablet, Oral, Every Other Day, Isidro England DO    lisinopril (PRINIVIL;ZESTRIL) tablet 5 mg, 5 mg, Oral, Daily, Mitch Winters DO, 5 mg at 01/03/24 1322    digoxin (LANOXIN) tablet 62.5 mcg, 62.5 mcg, Oral, Once per day on Mon Wed Fri, Mitch Winters DO, 62.5 mcg at 01/03/24 1322    Assessment: See below, see below    Patient Active Problem List   Diagnosis    Malignant neoplasm of overlapping sites of left female breast (HCC)    Arthritis    Acute respiratory failure with hypoxia (HCC)    Pneumonia due to infectious organism    WERNER (acute kidney injury) (HCC)    Lactic acidosis    COPD exacerbation (HCC)    Idiopathic progressive polyneuropathy    Disability of walking    Elevated lactic acid level    Persistent atrial fibrillation (HCC)    Chronic anemia    Diverticulosis of colon    Permanent atrial fibrillation (HCC)    Acute blood loss anemia    Hypoxia       Plan:  See orders  Labs,meds noted  Lanoxin/lisinopril started   Attempt to increase activity          Isidro England DO  8:42 AM  1/4/2024

## 2024-01-05 VITALS
HEIGHT: 68 IN | WEIGHT: 185 LBS | SYSTOLIC BLOOD PRESSURE: 128 MMHG | HEART RATE: 77 BPM | BODY MASS INDEX: 28.04 KG/M2 | RESPIRATION RATE: 20 BRPM | TEMPERATURE: 98.7 F | OXYGEN SATURATION: 93 % | DIASTOLIC BLOOD PRESSURE: 63 MMHG

## 2024-01-05 PROBLEM — I48.0 PAROXYSMAL ATRIAL FIBRILLATION (HCC): Status: ACTIVE | Noted: 2024-01-05

## 2024-01-05 PROCEDURE — 2580000003 HC RX 258: Performed by: FAMILY MEDICINE

## 2024-01-05 PROCEDURE — 6370000000 HC RX 637 (ALT 250 FOR IP): Performed by: INTERNAL MEDICINE

## 2024-01-05 PROCEDURE — 6370000000 HC RX 637 (ALT 250 FOR IP): Performed by: FAMILY MEDICINE

## 2024-01-05 RX ORDER — DIGOXIN 125 MCG
62.5 TABLET ORAL
Qty: 30 TABLET | Refills: 3 | Status: SHIPPED | OUTPATIENT
Start: 2024-01-05

## 2024-01-05 RX ORDER — LISINOPRIL 5 MG/1
5 TABLET ORAL DAILY
Qty: 30 TABLET | Refills: 3 | Status: SHIPPED | OUTPATIENT
Start: 2024-01-06

## 2024-01-05 RX ADMIN — LISINOPRIL 5 MG: 5 TABLET ORAL at 08:23

## 2024-01-05 RX ADMIN — SODIUM CHLORIDE, PRESERVATIVE FREE 10 ML: 5 INJECTION INTRAVENOUS at 08:24

## 2024-01-05 RX ADMIN — AMIODARONE HYDROCHLORIDE 200 MG: 200 TABLET ORAL at 08:23

## 2024-01-05 RX ADMIN — APIXABAN 5 MG: 5 TABLET, FILM COATED ORAL at 08:24

## 2024-01-05 NOTE — PROGRESS NOTES
Updated Dr. England in regards to cardiology okay for discharge. Final approval given to discharge.    Electronically signed by Anni Valle RN on 1/5/2024 at 12:13 PM

## 2024-01-05 NOTE — PLAN OF CARE
Problem: Discharge Planning  Goal: Discharge to home or other facility with appropriate resources  1/5/2024 1350 by Swathi Cordero RN  Outcome: Adequate for Discharge  1/4/2024 2358 by Gila Jacome RN  Outcome: Progressing     Problem: Pain  Goal: Verbalizes/displays adequate comfort level or baseline comfort level  1/5/2024 1350 by Swathi Cordero RN  Outcome: Adequate for Discharge  1/4/2024 2358 by Gila Jacome RN  Outcome: Progressing     Problem: ABCDS Injury Assessment  Goal: Absence of physical injury  1/5/2024 1350 by Swathi Cordero RN  Outcome: Adequate for Discharge  1/4/2024 2358 by Gila Jacome RN  Outcome: Progressing     Problem: Safety - Adult  Goal: Free from fall injury  1/5/2024 1350 by Swathi Cordero RN  Outcome: Adequate for Discharge  1/4/2024 2358 by Gila Jacome RN  Outcome: Progressing

## 2024-01-05 NOTE — PROGRESS NOTES
Subjective:    The patient is awake and alert.  No problems overnight. Feeling good this AM    Objective:    /85   Pulse 68   Temp 97.7 °F (36.5 °C) (Oral)   Resp 20   Ht 1.727 m (5' 8\")   Wt 83.9 kg (185 lb)   SpO2 93%   BMI 28.13 kg/m²     Heart:  RRR, no murmurs, gallops, or rubs.  Lungs:  CTA bilaterally, no wheeze, rales or rhonchi  Abd: bowel sounds present, nontender, nondistended, no masses  Extrem:  No clubbing, cyanosis, or edema    Labs:  CBC:   Lab Results   Component Value Date/Time    WBC 5.8 01/03/2024 12:17 AM    RBC 4.70 01/03/2024 12:17 AM    HGB 12.5 01/03/2024 12:17 AM    HCT 40.6 01/03/2024 12:17 AM    MCV 86.4 01/03/2024 12:17 AM    MCH 26.6 01/03/2024 12:17 AM    MCHC 30.8 01/03/2024 12:17 AM    RDW 19.0 01/03/2024 12:17 AM     01/03/2024 12:17 AM    MPV 9.8 01/03/2024 12:17 AM     CMP:    Lab Results   Component Value Date/Time     01/03/2024 12:15 PM    K 4.7 01/03/2024 12:15 PM    K 4.5 04/03/2023 05:52 AM     01/03/2024 12:15 PM    CO2 29 01/03/2024 12:15 PM    BUN 17 01/03/2024 12:15 PM    CREATININE 1.0 01/03/2024 12:15 PM    GFRAA >60 08/18/2021 03:43 PM    LABGLOM 55 01/03/2024 12:15 PM    GLUCOSE 122 01/03/2024 12:15 PM    PROT 6.9 01/03/2024 12:15 PM    LABALBU 4.1 01/03/2024 12:15 PM    CALCIUM 9.8 01/03/2024 12:15 PM    BILITOT 0.2 01/03/2024 12:15 PM    ALKPHOS 73 01/03/2024 12:15 PM    AST 24 01/03/2024 12:15 PM    ALT 20 01/03/2024 12:15 PM     PT/INR:    Lab Results   Component Value Date/Time    PROTIME 11.9 09/22/2023 01:25 AM    INR 1.1 09/22/2023 01:25 AM          Current Facility-Administered Medications:     sodium chloride flush 0.9 % injection 5-40 mL, 5-40 mL, IntraVENous, 2 times per day, Isidro England A, , 10 mL at 01/05/24 0824    sodium chloride flush 0.9 % injection 5-40 mL, 5-40 mL, IntraVENous, PRN, Isidro England A, DO    0.9 % sodium chloride infusion, , IntraVENous, PRN, Tomás, Isidro A, DO    acetaminophen (TYLENOL) tablet 650 mg,

## 2024-01-05 NOTE — PROGRESS NOTES
PROGRESS NOTE       PATIENT PROBLEM LIST:  Patient Active Problem List   Diagnosis Code    Malignant neoplasm of overlapping sites of left female breast ([facility]) C50.812    Arthritis M19.90    Acute respiratory failure with hypoxia ([facility]) J96.01    Pneumonia due to infectious organism J18.9    WERNER (acute kidney injury) ([facility]) N17.9    Lactic acidosis E87.20    COPD exacerbation ([facility]) J44.1    Idiopathic progressive polyneuropathy G60.3    Disability of walking R26.2    Elevated lactic acid level R79.89    Persistent atrial fibrillation ([facility]) I48.19    Chronic anemia D64.9    Diverticulosis of colon K57.30    Permanent atrial fibrillation ([facility]) I48.21    Acute blood loss anemia D62    Hypoxia R09.02       SUBJECTIVE:  Agueda Sierra states she feels better and slept well last evening without awakening to palpitations.  She notes that her change in medical regimen seems to have helped already.  She denies any chest discomfort.    REVIEW OF SYSTEMS:  General ROS: negative for - fatigue, malaise,  weight gain or weight loss  Psychological ROS: negative for - anxiety , depression  Ophthalmic ROS: negative for - decreased vision or visual distortion.  ENT ROS: negative  Allergy and Immunology ROS: negative  Hematological and Lymphatic ROS: negative  Endocrine: no heat or cold intolerance and no polyphagia, polydipsia, or polyuria  Respiratory ROS: positive for - shortness of breath- improving  Cardiovascular ROS: positive for - dyspnea on exertion, palpitations, and rapid heart rate.  Gastrointestinal ROS: no abdominal pain, change in bowel habits, or black or bloody stools  Genito-Urinary ROS: no nocturia, dysuria, trouble voiding, frequency or hematuria  Musculoskeletal ROS: negative for- myalgias, arthralgias, or claudication  Neurological ROS: no TIA or stroke symptoms otherwise no significant change in symptoms or problems since yesterday as documented in previous progress notes.    SCHEDULED MEDICATIONS:   sodium    Component Value Date/Time    PH 7.363 03/31/2023 05:00 PM    PO2 55.5 03/31/2023 05:00 PM    PCO2 42.3 03/31/2023 05:00 PM     INR: No results for input(s): \"INR\" in the last 72 hours.  PRO-BNP:   Lab Results   Component Value Date    PROBNP 241 01/03/2024    PROBNP 144 12/28/2023      TSH:   Lab Results   Component Value Date    TSH 1.75 01/03/2024      Cardiac Injury Profile:   Recent Labs     01/03/24  0017 01/03/24  0134   TROPHS 40* 36*      Lipid Profile:   Lab Results   Component Value Date/Time    TRIG 142 11/15/2022 01:51 PM    HDL 53 11/15/2022 01:51 PM    LDLCALC 197 11/15/2022 01:51 PM    CHOL 278 11/15/2022 01:51 PM      Hemoglobin A1C: No components found for: \"HGBA1C\"      RAD:   CTA NECK W CONTRAST    Result Date: 1/3/2024  Unremarkable CTA of the neck.     CTA HEAD W CONTRAST    Result Date: 1/3/2024  Unremarkable CTA of the head.     CT HEAD WO CONTRAST    Result Date: 1/3/2024  No acute intracranial abnormality.     XR CHEST PORTABLE    Result Date: 1/3/2024  Cardiomediastinal silhouette is within normal limits for size. No focal consolidation, sizeable pleural effusion, or gross pneumothorax. Metallic object overlies the left chest.     Vascular duplex lower extremity venous left    Result Date: 1/3/2024  No evidence of DVT in the left lower extremity.         EKG: See Report  Echo: See Report      IMPRESSIONS:  Patient Active Problem List   Diagnosis Code    Malignant neoplasm of overlapping sites of left female breast ([facility]) C50.812    Arthritis M19.90    Acute respiratory failure with hypoxia ([facility]) J96.01    Pneumonia due to infectious organism J18.9    WERNER (acute kidney injury) ([facility]) N17.9    Lactic acidosis E87.20    COPD exacerbation ([facility]) J44.1    Idiopathic progressive polyneuropathy G60.3    Disability of walking R26.2    Elevated lactic acid level R79.89    Persistent atrial fibrillation ([facility]) I48.19    Chronic anemia D64.9    Diverticulosis of colon K57.30    Permanent atrial

## 2024-01-05 NOTE — PROGRESS NOTES
PROGRESS NOTE       PATIENT PROBLEM LIST:  Patient Active Problem List   Diagnosis Code    Malignant neoplasm of overlapping sites of left female breast (HCC) C50.812    Arthritis M19.90    Acute respiratory failure with hypoxia (HCC) J96.01    Pneumonia due to infectious organism J18.9    WERNER (acute kidney injury) (HCC) N17.9    Lactic acidosis E87.20    COPD exacerbation ([facility]) J44.1    Idiopathic progressive polyneuropathy G60.3    Disability of walking R26.2    Elevated lactic acid level R79.89    Persistent atrial fibrillation (HCC) I48.19    Chronic anemia D64.9    Diverticulosis of colon K57.30    Permanent atrial fibrillation (HCC) I48.21    Acute blood loss anemia D62    Hypoxia R09.02    Paroxysmal atrial fibrillation (HCC) I48.0       SUBJECTIVE:  Agueda Sierra states she feels better and slept well last evening without awakening to palpitations.  She notes that her change in medical regimen seems to have helped already.  She denies any chest discomfort.    REVIEW OF SYSTEMS:  General ROS: negative for - fatigue, malaise,  weight gain or weight loss  Psychological ROS: negative for - anxiety , depression  Ophthalmic ROS: negative for - decreased vision or visual distortion.  ENT ROS: negative  Allergy and Immunology ROS: negative  Hematological and Lymphatic ROS: negative  Endocrine: no heat or cold intolerance and no polyphagia, polydipsia, or polyuria  Respiratory ROS: positive for - shortness of breath- improving  Cardiovascular ROS: positive for - dyspnea on exertion, palpitations, and rapid heart rate.  Gastrointestinal ROS: no abdominal pain, change in bowel habits, or black or bloody stools  Genito-Urinary ROS: no nocturia, dysuria, trouble voiding, frequency or hematuria  Musculoskeletal ROS: negative for- myalgias, arthralgias, or claudication  Neurological ROS: no TIA or stroke symptoms otherwise no significant change in symptoms or problems since yesterday as documented in previous progress  colon K57.30    Permanent atrial fibrillation (HCC) I48.21    Acute blood loss anemia D62    Hypoxia R09.02    Paroxysmal atrial fibrillation (HCC) I48.0       RECOMMENDATIONS:  We will maintain present dosage of antiarrhythmic and if she remains in sinus rhythm without breakthrough arrhythmia will be discharged tomorrow and follow-up as an outpatient.monitor and maintain electrolytes with appropriate supplementation.    I have spent more than 25 minutes face to face with Agueda Sierra and reviewing notes and laboratory data, with greater than 50% of this time instructing and counseling the patient  face to face regarding my findings and recommendations and I have answered all questions as posed to me by Ms. Sierra.    Mitch Winters, DO FACP,FACC,[facility]      NOTE:  This report was transcribed using voice recognition software.  Every effort was made to ensure accuracy; however, inadvertent computerized transcription errors may be present

## 2024-01-05 NOTE — CARE COORDINATION
Social work / Discharge Planning:          Initial assessment completed yesterday.   No discharge needs identified.     Patient has home food delivery and laundry services.     She anticipates discharge home with no needs.    Electronically signed by ROBERTO Levine on 1/5/2024 at 9:22 AM

## 2024-01-08 ENCOUNTER — APPOINTMENT (OUTPATIENT)
Dept: GENERAL RADIOLOGY | Age: 84
DRG: 880 | End: 2024-01-08
Payer: MEDICARE

## 2024-01-08 ENCOUNTER — CARE COORDINATION (OUTPATIENT)
Dept: CARE COORDINATION | Age: 84
End: 2024-01-08

## 2024-01-08 PROCEDURE — 71046 X-RAY EXAM CHEST 2 VIEWS: CPT

## 2024-01-08 PROCEDURE — 99285 EMERGENCY DEPT VISIT HI MDM: CPT

## 2024-01-08 PROCEDURE — 93005 ELECTROCARDIOGRAM TRACING: CPT

## 2024-01-08 ASSESSMENT — PAIN - FUNCTIONAL ASSESSMENT: PAIN_FUNCTIONAL_ASSESSMENT: 0-10

## 2024-01-08 ASSESSMENT — PAIN DESCRIPTION - FREQUENCY: FREQUENCY: INTERMITTENT

## 2024-01-08 ASSESSMENT — PAIN SCALES - GENERAL: PAINLEVEL_OUTOF10: 8

## 2024-01-08 NOTE — CARE COORDINATION
Care Transitions Initial Follow Up Call    Call within 2 business days of discharge: Yes    Patient Current Location:  Home: [address] [place], [place] [id number]    Care Transition Nurse contacted the patient by telephone to perform post hospital discharge assessment. Verified name and  with patient as identifiers. Provided introduction to self, and explanation of the Care Transition Nurse role.     Patient: Agueda Sierra Patient : 1940   MRN: [id number]  Reason for Admission: hypoxia, paroxysmal AFib  Discharge Date: 24 RARS: Readmission Risk Score: 13.9      Last Discharge Facility       Date Complaint Diagnosis Description Type Department Provider    24 Shortness of Breath; Blurred Vision; Numbness Hypoxia ... ED to Hosp-Admission (Discharged) (ADMITTED) [facility] Isidro England DO; Ganesh Roberts...            Was this an external facility discharge? No Discharge Facility: [facility]    Challenges to be reviewed by the provider   Additional needs identified to be addressed with provider: No  none               Method of communication with provider: none.      -Spoke with patient for Non [facility] PCP care transition call post hospital discharge.   -Patient admitted to [facility] on 24 with symptoms of increasing SOB and atypical chest pain.    -Patient reports she \"feels better,\" breathing is improved and having no chest pain.  -Patient denies any needs at this time.   -CTN to sign off.       Care Transition Nurse reviewed discharge instructions, medical action plan, and red flags with patient who verbalized understanding. The patient was given an opportunity to ask questions and does not have any further questions or concerns at this time. Were discharge instructions available to patient? Yes. Reviewed appropriate site of care based on symptoms and resources available to patient including: PCP  Specialist. The patient agrees to contact the PCP office for questions related to their healthcare.     Advance

## 2024-01-09 ENCOUNTER — HOSPITAL ENCOUNTER (INPATIENT)
Age: 84
LOS: 1 days | Discharge: HOME | DRG: 880 | End: 2024-01-11
Attending: EMERGENCY MEDICINE | Admitting: FAMILY MEDICINE
Payer: MEDICARE

## 2024-01-09 DIAGNOSIS — R07.9 CHEST PAIN, UNSPECIFIED TYPE: Primary | ICD-10-CM

## 2024-01-09 LAB
ALBUMIN SERPL-MCNC: 4.4 G/DL (ref 3.5–5.2)
ALP SERPL-CCNC: 71 U/L (ref 35–104)
ALT SERPL-CCNC: 21 U/L (ref 0–32)
ANION GAP SERPL CALCULATED.3IONS-SCNC: 10 MMOL/L (ref 7–16)
AST SERPL-CCNC: 21 U/L (ref 0–31)
BASOPHILS # BLD: 0.02 K/UL (ref 0–0.2)
BASOPHILS NFR BLD: 0 % (ref 0–2)
BILIRUB SERPL-MCNC: 0.3 MG/DL (ref 0–1.2)
BNP SERPL-MCNC: 116 PG/ML (ref 0–450)
BUN SERPL-MCNC: 22 MG/DL (ref 6–23)
CALCIUM SERPL-MCNC: 10.1 MG/DL (ref 8.6–10.2)
CHLORIDE SERPL-SCNC: 102 MMOL/L (ref 98–107)
CO2 SERPL-SCNC: 28 MMOL/L (ref 22–29)
CREAT SERPL-MCNC: 1.2 MG/DL (ref 0.5–1)
DIGOXIN SERPL-MCNC: 0.7 NG/ML (ref 0.8–2)
EOSINOPHIL # BLD: 0.08 K/UL (ref 0.05–0.5)
EOSINOPHILS RELATIVE PERCENT: 2 % (ref 0–6)
ERYTHROCYTE [DISTWIDTH] IN BLOOD BY AUTOMATED COUNT: 18.7 % (ref 11.5–15)
GFR SERPL CREATININE-BSD FRML MDRD: 48 ML/MIN/1.73M2
GLUCOSE SERPL-MCNC: 95 MG/DL (ref 74–99)
HCT VFR BLD AUTO: 40.4 % (ref 34–48)
HGB BLD-MCNC: 12.4 G/DL (ref 11.5–15.5)
IMM GRANULOCYTES # BLD AUTO: <0.03 K/UL (ref 0–0.58)
IMM GRANULOCYTES NFR BLD: 0 % (ref 0–5)
LYMPHOCYTES NFR BLD: 1.32 K/UL (ref 1.5–4)
LYMPHOCYTES RELATIVE PERCENT: 29 % (ref 20–42)
MAGNESIUM SERPL-MCNC: 2 MG/DL (ref 1.6–2.6)
MCH RBC QN AUTO: 26.9 PG (ref 26–35)
MCHC RBC AUTO-ENTMCNC: 30.7 G/DL (ref 32–34.5)
MCV RBC AUTO: 87.6 FL (ref 80–99.9)
MONOCYTES NFR BLD: 0.59 K/UL (ref 0.1–0.95)
MONOCYTES NFR BLD: 13 % (ref 2–12)
NEUTROPHILS NFR BLD: 56 % (ref 43–80)
NEUTS SEG NFR BLD: 2.51 K/UL (ref 1.8–7.3)
PLATELET # BLD AUTO: 233 K/UL (ref 130–450)
PMV BLD AUTO: 9.8 FL (ref 7–12)
POTASSIUM SERPL-SCNC: 4.1 MMOL/L (ref 3.5–5)
PROT SERPL-MCNC: 7.3 G/DL (ref 6.4–8.3)
RBC # BLD AUTO: 4.61 M/UL (ref 3.5–5.5)
SODIUM SERPL-SCNC: 140 MMOL/L (ref 132–146)
TROPONIN I SERPL HS-MCNC: 35 NG/L (ref 0–9)
TROPONIN I SERPL HS-MCNC: 37 NG/L (ref 0–9)
WBC OTHER # BLD: 4.5 K/UL (ref 4.5–11.5)

## 2024-01-09 PROCEDURE — G0378 HOSPITAL OBSERVATION PER HR: HCPCS

## 2024-01-09 PROCEDURE — 6370000000 HC RX 637 (ALT 250 FOR IP): Performed by: EMERGENCY MEDICINE

## 2024-01-09 PROCEDURE — 84484 ASSAY OF TROPONIN QUANT: CPT

## 2024-01-09 PROCEDURE — 2580000003 HC RX 258: Performed by: EMERGENCY MEDICINE

## 2024-01-09 PROCEDURE — 83735 ASSAY OF MAGNESIUM: CPT

## 2024-01-09 PROCEDURE — 80053 COMPREHEN METABOLIC PANEL: CPT

## 2024-01-09 PROCEDURE — 85025 COMPLETE CBC W/AUTO DIFF WBC: CPT

## 2024-01-09 PROCEDURE — 99222 1ST HOSP IP/OBS MODERATE 55: CPT | Performed by: STUDENT IN AN ORGANIZED HEALTH CARE EDUCATION/TRAINING PROGRAM

## 2024-01-09 PROCEDURE — 6370000000 HC RX 637 (ALT 250 FOR IP): Performed by: INTERNAL MEDICINE

## 2024-01-09 PROCEDURE — 2580000003 HC RX 258: Performed by: INTERNAL MEDICINE

## 2024-01-09 PROCEDURE — 36415 COLL VENOUS BLD VENIPUNCTURE: CPT

## 2024-01-09 PROCEDURE — 80162 ASSAY OF DIGOXIN TOTAL: CPT

## 2024-01-09 PROCEDURE — 83880 ASSAY OF NATRIURETIC PEPTIDE: CPT

## 2024-01-09 RX ORDER — LISINOPRIL 5 MG/1
5 TABLET ORAL EVERY MORNING
COMMUNITY

## 2024-01-09 RX ORDER — 0.9 % SODIUM CHLORIDE 0.9 %
1000 INTRAVENOUS SOLUTION INTRAVENOUS ONCE
Status: COMPLETED | OUTPATIENT
Start: 2024-01-09 | End: 2024-01-09

## 2024-01-09 RX ORDER — DIGOXIN 125 MCG
62.5 TABLET ORAL
Status: DISCONTINUED | OUTPATIENT
Start: 2024-01-10 | End: 2024-01-11 | Stop reason: HOSPADM

## 2024-01-09 RX ORDER — SODIUM CHLORIDE 0.9 % (FLUSH) 0.9 %
5-40 SYRINGE (ML) INJECTION PRN
Status: DISCONTINUED | OUTPATIENT
Start: 2024-01-09 | End: 2024-01-11 | Stop reason: HOSPADM

## 2024-01-09 RX ORDER — ONDANSETRON 4 MG/1
4 TABLET, ORALLY DISINTEGRATING ORAL EVERY 8 HOURS PRN
Status: DISCONTINUED | OUTPATIENT
Start: 2024-01-09 | End: 2024-01-11 | Stop reason: HOSPADM

## 2024-01-09 RX ORDER — SODIUM CHLORIDE 0.9 % (FLUSH) 0.9 %
5-40 SYRINGE (ML) INJECTION EVERY 12 HOURS SCHEDULED
Status: DISCONTINUED | OUTPATIENT
Start: 2024-01-09 | End: 2024-01-11 | Stop reason: HOSPADM

## 2024-01-09 RX ORDER — POLYETHYLENE GLYCOL 3350 17 G/17G
17 POWDER, FOR SOLUTION ORAL DAILY PRN
Status: DISCONTINUED | OUTPATIENT
Start: 2024-01-09 | End: 2024-01-11 | Stop reason: HOSPADM

## 2024-01-09 RX ORDER — MECLIZINE HCL 12.5 MG/1
12.5 TABLET ORAL 3 TIMES DAILY PRN
Status: DISCONTINUED | OUTPATIENT
Start: 2024-01-09 | End: 2024-01-11 | Stop reason: HOSPADM

## 2024-01-09 RX ORDER — ONDANSETRON 4 MG/1
4 TABLET, ORALLY DISINTEGRATING ORAL EVERY 8 HOURS PRN
Status: DISCONTINUED | OUTPATIENT
Start: 2024-01-09 | End: 2024-01-09 | Stop reason: SDUPTHER

## 2024-01-09 RX ORDER — ONDANSETRON 2 MG/ML
4 INJECTION INTRAMUSCULAR; INTRAVENOUS EVERY 6 HOURS PRN
Status: DISCONTINUED | OUTPATIENT
Start: 2024-01-09 | End: 2024-01-11 | Stop reason: HOSPADM

## 2024-01-09 RX ORDER — LISINOPRIL 5 MG/1
5 TABLET ORAL EVERY MORNING
Status: DISCONTINUED | OUTPATIENT
Start: 2024-01-09 | End: 2024-01-11 | Stop reason: HOSPADM

## 2024-01-09 RX ORDER — ENOXAPARIN SODIUM 100 MG/ML
40 INJECTION SUBCUTANEOUS DAILY
Status: DISCONTINUED | OUTPATIENT
Start: 2024-01-09 | End: 2024-01-09 | Stop reason: SDUPTHER

## 2024-01-09 RX ORDER — METOPROLOL TARTRATE 50 MG/1
50 TABLET, FILM COATED ORAL
Status: ACTIVE | OUTPATIENT
Start: 2024-01-09 | End: 2024-01-10

## 2024-01-09 RX ORDER — POTASSIUM CHLORIDE 20 MEQ/1
40 TABLET, EXTENDED RELEASE ORAL PRN
Status: DISCONTINUED | OUTPATIENT
Start: 2024-01-09 | End: 2024-01-11 | Stop reason: HOSPADM

## 2024-01-09 RX ORDER — ACETAMINOPHEN 650 MG/1
650 SUPPOSITORY RECTAL EVERY 6 HOURS PRN
Status: DISCONTINUED | OUTPATIENT
Start: 2024-01-09 | End: 2024-01-11 | Stop reason: HOSPADM

## 2024-01-09 RX ORDER — DIGOXIN 125 MCG
62.5 TABLET ORAL
COMMUNITY

## 2024-01-09 RX ORDER — ONDANSETRON 2 MG/ML
4 INJECTION INTRAMUSCULAR; INTRAVENOUS EVERY 6 HOURS PRN
Status: DISCONTINUED | OUTPATIENT
Start: 2024-01-09 | End: 2024-01-09 | Stop reason: SDUPTHER

## 2024-01-09 RX ORDER — AMIODARONE HYDROCHLORIDE 200 MG/1
200 TABLET ORAL EVERY MORNING
Status: DISCONTINUED | OUTPATIENT
Start: 2024-01-09 | End: 2024-01-11 | Stop reason: HOSPADM

## 2024-01-09 RX ORDER — POTASSIUM CHLORIDE 7.45 MG/ML
10 INJECTION INTRAVENOUS PRN
Status: DISCONTINUED | OUTPATIENT
Start: 2024-01-09 | End: 2024-01-11 | Stop reason: HOSPADM

## 2024-01-09 RX ORDER — FERROUS SULFATE 325(65) MG
325 TABLET ORAL EVERY OTHER DAY
Status: DISCONTINUED | OUTPATIENT
Start: 2024-01-10 | End: 2024-01-11 | Stop reason: HOSPADM

## 2024-01-09 RX ORDER — ACETAMINOPHEN 325 MG/1
650 TABLET ORAL EVERY 4 HOURS PRN
Status: DISCONTINUED | OUTPATIENT
Start: 2024-01-09 | End: 2024-01-09 | Stop reason: SDUPTHER

## 2024-01-09 RX ORDER — SODIUM CHLORIDE 9 MG/ML
INJECTION, SOLUTION INTRAVENOUS PRN
Status: DISCONTINUED | OUTPATIENT
Start: 2024-01-09 | End: 2024-01-11 | Stop reason: HOSPADM

## 2024-01-09 RX ORDER — SODIUM CHLORIDE 9 MG/ML
INJECTION, SOLUTION INTRAVENOUS PRN
Status: DISCONTINUED | OUTPATIENT
Start: 2024-01-09 | End: 2024-01-09 | Stop reason: SDUPTHER

## 2024-01-09 RX ORDER — MAGNESIUM SULFATE IN WATER 40 MG/ML
2000 INJECTION, SOLUTION INTRAVENOUS PRN
Status: DISCONTINUED | OUTPATIENT
Start: 2024-01-09 | End: 2024-01-11 | Stop reason: HOSPADM

## 2024-01-09 RX ORDER — ASPIRIN 81 MG/1
324 TABLET, CHEWABLE ORAL ONCE
Status: COMPLETED | OUTPATIENT
Start: 2024-01-09 | End: 2024-01-09

## 2024-01-09 RX ORDER — ACETAMINOPHEN 325 MG/1
650 TABLET ORAL EVERY 6 HOURS PRN
Status: DISCONTINUED | OUTPATIENT
Start: 2024-01-09 | End: 2024-01-11 | Stop reason: HOSPADM

## 2024-01-09 RX ADMIN — APIXABAN 5 MG: 5 TABLET, FILM COATED ORAL at 11:48

## 2024-01-09 RX ADMIN — Medication 10 ML: at 21:00

## 2024-01-09 RX ADMIN — APIXABAN 5 MG: 5 TABLET, FILM COATED ORAL at 21:00

## 2024-01-09 RX ADMIN — AMIODARONE HYDROCHLORIDE 200 MG: 200 TABLET ORAL at 11:48

## 2024-01-09 RX ADMIN — SODIUM CHLORIDE 1000 ML: 9 INJECTION, SOLUTION INTRAVENOUS at 04:45

## 2024-01-09 RX ADMIN — LISINOPRIL 5 MG: 5 TABLET ORAL at 11:48

## 2024-01-09 RX ADMIN — ASPIRIN 81 MG CHEWABLE TABLET 324 MG: 81 TABLET CHEWABLE at 11:47

## 2024-01-09 ASSESSMENT — PAIN SCALES - GENERAL
PAINLEVEL_OUTOF10: 0

## 2024-01-09 ASSESSMENT — PAIN - FUNCTIONAL ASSESSMENT
PAIN_FUNCTIONAL_ASSESSMENT: 0-10
PAIN_FUNCTIONAL_ASSESSMENT: NONE - DENIES PAIN

## 2024-01-09 NOTE — H&P
[facility] Hospitalist Group History and Physical    CHIEF COMPLAINT:  chest pain and SOB     History of Present Illness:      This is an 83-year-old  female with a significant past medical history of malignant neoplasm of the left breast and chronic atrial fibrillation with rapid ventricular response. She presents to ER with chest pain, tightness, heart palpitations and numbness in left hand. Of note, she was admitted for the same 1/2-1/5 with cardiology consulted. Patient relates that she may be getting panic attacks which are increasing palpitations. Hypertensive on arrival. Labs remarkable for Cr 1.2, Troponin 37 < 35, Digoxin 0.7. CXR negative. Decision to admit with cardiology consult.     Informant(s) for H&P: patient     REVIEW OF SYSTEMS:  A comprehensive review of systems was negative except for: what is in the HPI      PMH:  Past Medical History:   Diagnosis Date    Breast cancer (HCC)     Chronic a-fib (HCC)     Heart palpitations     a fib       Surgical History:  Past Surgical History:   Procedure Laterality Date    COLONOSCOPY N/A 9/22/2023    COLONOSCOPY POLYPECTOMY SNARE/COLD BIOPSY performed by Rob Deutsch MD at [facility] ENDOSCOPY    KNEE SURGERY      MASTECTOMY Left     UPPER GASTROINTESTINAL ENDOSCOPY N/A 9/20/2023    EGD BIOPSY performed by Rob Deutsch MD at [facility] ENDOSCOPY       Medications Prior to Admission:    Prior to Admission medications    Medication Sig Start Date End Date Taking? Authorizing Provider   lisinopril (PRINIVIL;ZESTRIL) 5 MG tablet Take 1 tablet by mouth daily 1/6/24   Mitch Winters DO   digoxin (LANOXIN) 125 MCG tablet Take 0.5 tablets by mouth Every Monday, Wednesday, and Friday 1/5/24   Mitch Winters DO   amiodarone (CORDARONE) 200 MG tablet Take 1 tablet by mouth every morning    Iram Martin MD   ferrous sulfate (IRON 325) 325 (65 Fe) MG tablet Take 1 tablet by mouth every other day WITH DINNER (LARGEST MEAL)    Iram Martin

## 2024-01-09 NOTE — PROGRESS NOTES
Contacted Dr. Mitch Winters regarding NPO status per request of patient, to see if any testing will be done today. Orders to follow.

## 2024-01-09 NOTE — PROGRESS NOTES
Database initiated. Patient is A&O comes in from home alone. States she uses  cane and a walker and is RA at baseline. She is a NO LEFT ARM d/t mastectomy.

## 2024-01-09 NOTE — ED NOTES
Nurse to nurse called to 6S. Accepting RN updated on pt plan and condition. Pt to be transported to accepting unit with this RN. Will continue to closely monitor pt.

## 2024-01-09 NOTE — PROGRESS NOTES
4 Eyes Skin Assessment     NAME:  Agueda Sierra  YOB: 1940  MEDICAL RECORD NUMBER:  [id number]    The patient is being assessed for  Admission    I agree that at least one RN has performed a thorough Head to Toe Skin Assessment on the patient. ALL assessment sites listed below have been assessed.      Areas assessed by both nurses:    Head, Face, Ears, Shoulders, Back, Chest, Arms, Elbows, Hands, Sacrum. Buttock, Coccyx, Ischium, Legs. Feet and Heels, Under Medical Devices , and Other none        Does the Patient have a Wound? No noted wound(s)       Nba Prevention initiated by RN: No  Wound Care Orders initiated by RN: No    Pressure Injury (Stage 3,4, Unstageable, DTI, NWPT, and Complex wounds) if present, place Wound referral order by RN under : No    New Ostomies, if present place, Ostomy referral order under : No     Nurse 1 eSignature: Electronically signed by Awilda Patterson RN on 1/9/24 at 1:26 PM EST    **SHARE this note so that the co-signing nurse can place an eSignature**    Nurse 2 eSignature: Electronically signed by Emelyn Morin RN on 1/9/24 at 2:54 PM EST

## 2024-01-09 NOTE — ED NOTES
Department of Emergency Medicine  FIRST PROVIDER TRIAGE NOTE             Independent MLP           1/8/24  8:28 PM EST    Date of Encounter: 1/8/24   MRN: [id number]      HPI: Agueda Sierra is a 83 y.o. female who presents to the ED for Chest Pain (Beginning 3 hours ago.  Shortness of breath Discharged 1 week ago) and Numbness  Patient states that about 3 hours prior to arrival she began to have shortness of breath as well as tightness in her chest.  States she gets intermittent tingling in her left hand.  Denies fevers or chills.    ROS: Negative for abd pain or back pain.    PE: Gen Appearance/Constitutional: alert  CV: regular rate     Initial Plan of Care: All treatment areas with department are currently occupied. Plan to order/Initiate the following while awaiting opening in ED: EKG and imaging studies.  Initiate Treatment-Testing, Proceed toTreatment Area When Bed Available for ED Attending/MLP to Continue Care    Electronically signed by NATALY Mckenzie CNP   DD: 1/8/24       Celeste Spencer APRN - CNP  01/08/24 2028

## 2024-01-09 NOTE — CARE COORDINATION
Social Work / Discharge PLanning : Patient admitted OBSERVATION with Dx of Chest pain. Patient resides alone in a one story home and has a cane and ww. Patient has hx of [facility] in the past and has no history of ONEIL.  Patient has food delivery service from [facility] and a laundry service from [facility]. Per cardiology : Coronary CTA for 1-10-24 . Patient on RA. AWait plan. Goal HOME at discharge. SW to follow. Electronically signed by ROBERTO Crandall on 1/9/24 at 5:01 PM EST

## 2024-01-09 NOTE — PROGRESS NOTES
Per Dr. Mitch Winters schedule a Coronary CTA for 1-10-24. Patient to be NPO after midnight. Per Dr. Akers call him in am for Coronary CTA orders.   There are no Wet Read(s) to document. There is 1 Wet Read(s) to document.

## 2024-01-09 NOTE — ED PROVIDER NOTES
acute abnormality. SINUSES: The visualized paranasal sinuses and mastoid air cells demonstrate no acute abnormality. SOFT TISSUES/SKULL: No acute abnormality of the visualized skull or soft tissues.     No acute intracranial abnormality.     XR CHEST PORTABLE    Result Date: 1/3/2024  EXAMINATION: ONE XRAY VIEW OF THE CHEST 1/3/2024 12:58 am COMPARISON: None. HISTORY: ORDERING SYSTEM PROVIDED HISTORY: shortness of breath TECHNOLOGIST PROVIDED HISTORY: Reason for exam:->shortness of breath     Cardiomediastinal silhouette is within normal limits for size. No focal consolidation, sizeable pleural effusion, or gross pneumothorax. Metallic object overlies the left chest.     Vascular duplex lower extremity venous left    Result Date: 1/3/2024  EXAMINATION: DUPLEX VENOUS ULTRASOUND OF THE LEFT LOWER EXTREMITY 1/2/2024 11:50 pm TECHNIQUE: Duplex ultrasound using B-mode/gray scaled imaging and Doppler spectral analysis and color flow was obtained of the deep venous structures of the left extremity. COMPARISON: None. HISTORY: ORDERING SYSTEM PROVIDED HISTORY: Edema left calf FINDINGS: The visualized veins of the left lower extremity are patent and free of echogenic thrombus. The veins demonstrate good compressibility with normal color flow study and spectral analysis.     No evidence of DVT in the left lower extremity.       No results found.    Is this patient to be included in the SEP-1 core measure due to severe sepsis or septic shock? No Exclusion criteria - the patient is NOT to be included for SEP-1 Core Measure due to: Infection is not suspected    PROCEDURES   Unless otherwise noted below, none    PAST MEDICAL HISTORY/Chronic Conditions Affecting Care      has a past medical history of Breast cancer (HCC), Chronic a-fib (HCC), and Heart palpitations.     EMERGENCY DEPARTMENT COURSE    Vitals:    Vitals:    01/08/24 2026 01/09/24 0445   BP: (!) 153/57 (!) 150/51   Pulse: 74 61   Resp: 22 18   Temp: 98.1 °F (36.7 °C)

## 2024-01-09 NOTE — ACP (ADVANCE CARE PLANNING)
Advance Care Planning   Healthcare Decision Maker:    Primary Decision Maker: KELSEY BAEZ - [phone]    Click here to complete Healthcare Decision Makers including selection of the Healthcare Decision Maker Relationship (ie \"Primary\").  Today we documented Decision Maker(s) consistent with ACP documents on file.

## 2024-01-10 ENCOUNTER — APPOINTMENT (OUTPATIENT)
Dept: CT IMAGING | Age: 84
DRG: 880 | End: 2024-01-10
Payer: MEDICARE

## 2024-01-10 LAB
ALBUMIN SERPL-MCNC: 3.6 G/DL (ref 3.5–5.2)
ALP SERPL-CCNC: 58 U/L (ref 35–104)
ALT SERPL-CCNC: 19 U/L (ref 0–32)
ANION GAP SERPL CALCULATED.3IONS-SCNC: 7 MMOL/L (ref 7–16)
AST SERPL-CCNC: 21 U/L (ref 0–31)
BASOPHILS # BLD: 0.01 K/UL (ref 0–0.2)
BASOPHILS NFR BLD: 0 % (ref 0–2)
BILIRUB SERPL-MCNC: 0.2 MG/DL (ref 0–1.2)
BUN SERPL-MCNC: 15 MG/DL (ref 6–23)
CALCIUM SERPL-MCNC: 9.3 MG/DL (ref 8.6–10.2)
CHLORIDE SERPL-SCNC: 105 MMOL/L (ref 98–107)
CO2 SERPL-SCNC: 28 MMOL/L (ref 22–29)
CREAT SERPL-MCNC: 0.9 MG/DL (ref 0.5–1)
EOSINOPHIL # BLD: 0.09 K/UL (ref 0.05–0.5)
EOSINOPHILS RELATIVE PERCENT: 3 % (ref 0–6)
ERYTHROCYTE [DISTWIDTH] IN BLOOD BY AUTOMATED COUNT: 18.7 % (ref 11.5–15)
GFR SERPL CREATININE-BSD FRML MDRD: >60 ML/MIN/1.73M2
GLUCOSE SERPL-MCNC: 75 MG/DL (ref 74–99)
HCT VFR BLD AUTO: 37.5 % (ref 34–48)
HGB BLD-MCNC: 11.5 G/DL (ref 11.5–15.5)
IMM GRANULOCYTES # BLD AUTO: <0.03 K/UL (ref 0–0.58)
IMM GRANULOCYTES NFR BLD: 0 % (ref 0–5)
LYMPHOCYTES NFR BLD: 1.06 K/UL (ref 1.5–4)
LYMPHOCYTES RELATIVE PERCENT: 30 % (ref 20–42)
MCH RBC QN AUTO: 27 PG (ref 26–35)
MCHC RBC AUTO-ENTMCNC: 30.7 G/DL (ref 32–34.5)
MCV RBC AUTO: 88 FL (ref 80–99.9)
MONOCYTES NFR BLD: 0.47 K/UL (ref 0.1–0.95)
MONOCYTES NFR BLD: 13 % (ref 2–12)
NEUTROPHILS NFR BLD: 54 % (ref 43–80)
NEUTS SEG NFR BLD: 1.92 K/UL (ref 1.8–7.3)
PLATELET # BLD AUTO: 209 K/UL (ref 130–450)
PMV BLD AUTO: 10.5 FL (ref 7–12)
POTASSIUM SERPL-SCNC: 4 MMOL/L (ref 3.5–5)
PROT SERPL-MCNC: 6 G/DL (ref 6.4–8.3)
RBC # BLD AUTO: 4.26 M/UL (ref 3.5–5.5)
SODIUM SERPL-SCNC: 140 MMOL/L (ref 132–146)
WBC OTHER # BLD: 3.6 K/UL (ref 4.5–11.5)

## 2024-01-10 PROCEDURE — 2580000003 HC RX 258: Performed by: INTERNAL MEDICINE

## 2024-01-10 PROCEDURE — 80053 COMPREHEN METABOLIC PANEL: CPT

## 2024-01-10 PROCEDURE — 6370000000 HC RX 637 (ALT 250 FOR IP): Performed by: INTERNAL MEDICINE

## 2024-01-10 PROCEDURE — 75574 CT ANGIO HRT W/3D IMAGE: CPT | Performed by: INTERNAL MEDICINE

## 2024-01-10 PROCEDURE — 36415 COLL VENOUS BLD VENIPUNCTURE: CPT

## 2024-01-10 PROCEDURE — 6360000004 HC RX CONTRAST MEDICATION: Performed by: RADIOLOGY

## 2024-01-10 PROCEDURE — 75574 CT ANGIO HRT W/3D IMAGE: CPT

## 2024-01-10 PROCEDURE — G0378 HOSPITAL OBSERVATION PER HR: HCPCS

## 2024-01-10 PROCEDURE — 99239 HOSP IP/OBS DSCHRG MGMT >30: CPT | Performed by: STUDENT IN AN ORGANIZED HEALTH CARE EDUCATION/TRAINING PROGRAM

## 2024-01-10 PROCEDURE — 85025 COMPLETE CBC W/AUTO DIFF WBC: CPT

## 2024-01-10 PROCEDURE — 2060000000 HC ICU INTERMEDIATE R&B

## 2024-01-10 RX ORDER — NITROGLYCERIN 0.4 MG/1
0.8 TABLET SUBLINGUAL ONCE
Status: COMPLETED | OUTPATIENT
Start: 2024-01-10 | End: 2024-01-10

## 2024-01-10 RX ORDER — 0.9 % SODIUM CHLORIDE 0.9 %
1000 INTRAVENOUS SOLUTION INTRAVENOUS ONCE
Status: COMPLETED | OUTPATIENT
Start: 2024-01-10 | End: 2024-01-10

## 2024-01-10 RX ORDER — METOPROLOL TARTRATE 50 MG/1
100 TABLET, FILM COATED ORAL ONCE
Status: COMPLETED | OUTPATIENT
Start: 2024-01-10 | End: 2024-01-10

## 2024-01-10 RX ADMIN — LISINOPRIL 5 MG: 5 TABLET ORAL at 12:12

## 2024-01-10 RX ADMIN — NITROGLYCERIN 0.8 MG: 0.4 TABLET, ORALLY DISINTEGRATING SUBLINGUAL at 11:06

## 2024-01-10 RX ADMIN — FERROUS SULFATE TAB 325 MG (65 MG ELEMENTAL FE) 325 MG: 325 (65 FE) TAB at 18:06

## 2024-01-10 RX ADMIN — IOPAMIDOL 80 ML: 755 INJECTION, SOLUTION INTRAVENOUS at 11:16

## 2024-01-10 RX ADMIN — SODIUM CHLORIDE 1000 ML: 9 INJECTION, SOLUTION INTRAVENOUS at 08:22

## 2024-01-10 RX ADMIN — APIXABAN 5 MG: 5 TABLET, FILM COATED ORAL at 21:02

## 2024-01-10 RX ADMIN — ACETAMINOPHEN 650 MG: 325 TABLET ORAL at 23:20

## 2024-01-10 RX ADMIN — AMIODARONE HYDROCHLORIDE 200 MG: 200 TABLET ORAL at 12:12

## 2024-01-10 RX ADMIN — APIXABAN 5 MG: 5 TABLET, FILM COATED ORAL at 12:12

## 2024-01-10 RX ADMIN — DIGOXIN 62.5 MCG: 0.12 TABLET ORAL at 21:02

## 2024-01-10 RX ADMIN — Medication 10 ML: at 21:04

## 2024-01-10 RX ADMIN — METOPROLOL TARTRATE 100 MG: 50 TABLET, FILM COATED ORAL at 08:13

## 2024-01-10 NOTE — CONSULTS
CARDIOLOGY CONSULTATION      Patient Name:  Agueda Sierra    :  1940    Reason for Consultation:   Atrial fibrillation rapid ventricular response- symptomatic; chest discomfort    History of Present Illness:   Agueda Sierra returns to [facility], following a history of feeling anxious and mild chest discomfort as feeling her bra is on too tight both at rest and with exertion lasting for minutes at a time.  She was just released from the hospital having recurrent episodes of palpitations especially in the evening which have markedly improved since increasing her dosage of amiodarone presently.  She now returns to the hospital for further observation and diagnostic testing and adjustment of her medical regimen.  During her last hospitalization only a week ago, she had no complaints of chest discomfort at that time.  She noted that her evening palpitations have improved considerably with her change in medications but she remains anxious and somewhat depressed and wishes to be treated for the above.  Past Medical History:   has a past medical history of Breast cancer (HCC), Chronic a-fib (HCC), and Heart palpitations.    Surgical History:   has a past surgical history that includes knee surgery; Mastectomy (Left); Upper gastrointestinal endoscopy (N/A, 2023); and Colonoscopy (N/A, 2023).     Social History:   reports that she has been smoking cigarettes. She does not have any smokeless tobacco history on file. She reports that she does not drink alcohol and does not use drugs.     Family History:  family history is not on file.     Medications:  Prior to Admission medications    Medication Sig Start Date End Date Taking? Authorizing Provider   digoxin (LANOXIN) 125 MCG tablet Take 0.5 tablets by mouth three times a week *[id number]-*   Yes Provider, MD Iram   lisinopril (PRINIVIL;ZESTRIL) 5 MG tablet Take 1 tablet by mouth every morning   Yes ProviderIram,

## 2024-01-10 NOTE — PROGRESS NOTES
Patient arrived via for CTA coronary arteries. Allergies reviewed, instructions given to  include protocol for heart rate, b/p, necessary medications that will be given, IV site and proper breath hold during scan. Questions answered and expressed understanding confirmed. Placed on monitoring devices for heart rate and rhythm, B/P taken,  IV flushed.    Nitroglycerin given per order and patient scanned. Vitals signs obtained post procedure and patient report given to floor RN. Patient transported without difficulty.

## 2024-01-10 NOTE — PROGRESS NOTES
Pt states she does not have anyone to pick her up tonight until after midnight. Pt is not comfortable going home tonight alone, taxi was offered by charge nurse. Pt states she has steps to get into house and uses cane. States she would not feel safe to go home alone this late at night being that it is dark. States she also lives in an unsafe neighborhood and does not even have neighbors that could help her tonight.

## 2024-01-10 NOTE — PROGRESS NOTES
Pt states the  Was around and said something about discharging her. Pt states her test isn't back yet so she did not understand this. Pt states she also does not drive, does not have a ride tonight as her granddaughter is working tonight. States that if she did have a ride she is not comfortable going home at night alone as she lives in an unsafe area.

## 2024-01-10 NOTE — DISCHARGE SUMMARY
aorta, main pulmonary artery, left and right pulm arteries of normal caliber. NON-CARDIAC FINDINGS -all noncardiac findings will be reported separately. NON-CARDIAC FINDINGS: The thoracic aorta is normal caliber.  There is no pericardial effusion.  No mediastinal or hilar lymphadenopathy is identified. Segments of the lungs were included on the examination.  There are fibrotic changes seen along the anterior aspect of the left chest wall likely secondary to post radiation changes in a patient who has undergone previous left mastectomy and radiation therapy.  No chest wall masses noted.     The left main coronary artery has no significant stenosis. The LAD has no significant stenosis but the first diagonal artery has calcified plaques in the midsegment with 30 to 40% stenosis followed by large calcified plaque with significant blooming artifact and underlying hemodynamically significant stenosis cannot be ruled out at this location. The LCx has 10 to 20% stenosis in the proximal segment The RCA has no significant stenosis. Normal LV size. Total Calcium score of 66 with mild plaque burden.  This places the patient at the 39 percentile for age, sex and race. CAD-RADS: 2/P1.  Maximal stenosis 25-49 %, hemodynamically significant stenosis involving the mid first diagonal artery cannot be ruled out. Interpretation, mild nonobstructive CAD except at the mid segment of first diagonal artery and hemodynamically significant stenosis at this level cannot be ruled out. Noncardiac impression: 1.  There are no clinically significant extracardiac findings 2.  Pleural based fibrotic changes seen within the anterior aspect of the left upper lobe likely secondary to post radiation changes in a patient who has undergone previous mastectomy and post radiation therapy.. Interpreted by: Brown Akers MD Signed by: Brown Akers MD 1/10/24 Final result       Patient Instructions:      Medication List        CONTINUE taking these

## 2024-01-11 VITALS
RESPIRATION RATE: 16 BRPM | WEIGHT: 185.38 LBS | HEIGHT: 68 IN | DIASTOLIC BLOOD PRESSURE: 54 MMHG | HEART RATE: 52 BPM | SYSTOLIC BLOOD PRESSURE: 115 MMHG | TEMPERATURE: 97.8 F | OXYGEN SATURATION: 93 % | BODY MASS INDEX: 28.1 KG/M2

## 2024-01-11 LAB
EKG ATRIAL RATE: 72 BPM
EKG P AXIS: 91 DEGREES
EKG P-R INTERVAL: 202 MS
EKG Q-T INTERVAL: 418 MS
EKG QRS DURATION: 82 MS
EKG QTC CALCULATION (BAZETT): 457 MS
EKG R AXIS: -35 DEGREES
EKG T AXIS: 36 DEGREES
EKG VENTRICULAR RATE: 72 BPM

## 2024-01-11 PROCEDURE — 93010 ELECTROCARDIOGRAM REPORT: CPT | Performed by: INTERNAL MEDICINE

## 2024-01-11 PROCEDURE — 6370000000 HC RX 637 (ALT 250 FOR IP): Performed by: INTERNAL MEDICINE

## 2024-01-11 PROCEDURE — 2580000003 HC RX 258: Performed by: INTERNAL MEDICINE

## 2024-01-11 RX ADMIN — LISINOPRIL 5 MG: 5 TABLET ORAL at 10:31

## 2024-01-11 RX ADMIN — APIXABAN 5 MG: 5 TABLET, FILM COATED ORAL at 10:09

## 2024-01-11 RX ADMIN — AMIODARONE HYDROCHLORIDE 200 MG: 200 TABLET ORAL at 10:31

## 2024-01-11 RX ADMIN — Medication 10 ML: at 10:10

## 2024-01-11 NOTE — PROGRESS NOTES
Subjective:    The patient is awake and alert.  No problems overnight.  Denies chest pain, angina, and dyspnea.  Denies abdominal pain.  Tolerating diet.  No nausea or vomiting.    Objective:    BP (!) 148/67   Pulse (!) 47   Temp 97.4 °F (36.3 °C) (Oral)   Resp 18   Ht 1.727 m (5' 8\")   Wt 84.1 kg (185 lb 6 oz)   SpO2 98%   BMI 28.19 kg/m²     Heart:  RRR, no murmurs, gallops, or rubs.  Lungs:  CTA bilaterally, no wheeze, rales or rhonchi  Abd: bowel sounds present, nontender, nondistended, no masses  Extrem:  No clubbing, cyanosis, or edema    Labs:  CBC:   Lab Results   Component Value Date/Time    WBC 3.6 01/10/2024 06:43 AM    RBC 4.26 01/10/2024 06:43 AM    HGB 11.5 01/10/2024 06:43 AM    HCT 37.5 01/10/2024 06:43 AM    MCV 88.0 01/10/2024 06:43 AM    MCH 27.0 01/10/2024 06:43 AM    MCHC 30.7 01/10/2024 06:43 AM    RDW 18.7 01/10/2024 06:43 AM     01/10/2024 06:43 AM    MPV 10.5 01/10/2024 06:43 AM     CMP:    Lab Results   Component Value Date/Time     01/10/2024 06:43 AM    K 4.0 01/10/2024 06:43 AM    K 4.5 04/03/2023 05:52 AM     01/10/2024 06:43 AM    CO2 28 01/10/2024 06:43 AM    BUN 15 01/10/2024 06:43 AM    CREATININE 0.9 01/10/2024 06:43 AM    GFRAA >60 08/18/2021 03:43 PM    LABGLOM >60 01/10/2024 06:43 AM    GLUCOSE 75 01/10/2024 06:43 AM    PROT 6.0 01/10/2024 06:43 AM    LABALBU 3.6 01/10/2024 06:43 AM    CALCIUM 9.3 01/10/2024 06:43 AM    BILITOT 0.2 01/10/2024 06:43 AM    ALKPHOS 58 01/10/2024 06:43 AM    AST 21 01/10/2024 06:43 AM    ALT 19 01/10/2024 06:43 AM     PT/INR:    Lab Results   Component Value Date/Time    PROTIME 11.9 09/22/2023 01:25 AM    INR 1.1 09/22/2023 01:25 AM          Current Facility-Administered Medications:     sodium chloride flush 0.9 % injection 5-40 mL, 5-40 mL, IntraVENous, 2 times per day, Hric, Sundar, DO    sodium chloride flush 0.9 % injection 5-40 mL, 5-40 mL, IntraVENous, PRN, Hric, Sundar, DO    0.9 % sodium chloride infusion, ,

## 2024-01-12 ENCOUNTER — CARE COORDINATION (OUTPATIENT)
Dept: CARE COORDINATION | Age: 84
End: 2024-01-12

## 2024-01-12 NOTE — CARE COORDINATION
Non MH PCP Care Transitions Initial Follow Up Call    Call within 2 business days of discharge: Yes    Patient Current Location:  Home: [address] [place] [address]    Care Transition Nurse contacted the patient by telephone to perform post hospital discharge assessment.  Provided introduction to self, and explanation of the Care Transition Nurse role.     Patient: Agueda Sierra Patient : 1940   MRN: [id number]  Reason for Admission: CP  Discharge Date: 24 RARS: Readmission Risk Score: 15.9      Last Discharge Facility       Date Complaint Diagnosis Description Type Department Provider    24 Chest Pain; Numbness Chest pain, unspecified type ED to Hosp-Admission (Discharged) (ADMITTED) ASHISH LoyolaS Isidro England, ; Sheila Dunham ...            Was this an external facility discharge? No     Challenges to be reviewed by the provider   Additional needs identified to be addressed with provider: No  none               Method of communication with provider: none.    CTN called and spoke with the pt for an initial non MH PCP care transition call post hospital discharge. Pt admitted on 24 dx CP. Presented to ED with c/o CP and feeling anxious. Pt recently admitted from -24 with +A-fib, palpitations, and c/o lightheadedness. Discharged with antiarrhythmic medication adjustments.    Pt states that she is doing \"good\" and offered no complaints today. Pt denies any recurrent CP/chest discomfort. She denies any sob, palpitations, or lightheadedness/dizziness.    CTN reviewed the pt's medications in full. 1111F not entered, as established with non MH pcp. No new medications ordered at discharge.    CTN reviewed HFU appt information. Pt states that she has a HFU with her pcp on 1/15/24 and with Dr. Winters at the end of this month. She sets up transportation with her insurance and will have rides to her appts.     Pt did not voice any needs/concerns today. CTN signing off at this time.     Care

## 2024-01-12 NOTE — PROGRESS NOTES
CLINICAL PHARMACY NOTE: MEDS TO BEDS    Total # of Prescriptions Filled: 2   The following medications were delivered to the patient:  Lisinopril 5 mg  Digoxin 125 mcg     Additional Documentation:

## 2024-01-12 NOTE — PROGRESS NOTES
Physician Progress Note      PATIENT:               NARESH JEAN  [facility] #:                  [id number]  :                       1940  ADMIT DATE:       2024 11:15 PM  DISCH DATE:        2024 3:59 PM  RESPONDING  PROVIDER #:        Isidro England DO          QUERY TEXT:    Pt admitted with Dyspnea and atypical chest pain.  Pt noted to have Atrial   Fibrillation by history. If possible, please document in progress notes and   discharge summary if you are evaluating and/or treating any of the following:    The medical record reflects the following:  Risk Factors: Chronic A-fib history  Clinical Indicators:  24: Per H&P: Dyspnea, worse on exertion, Atypical chest pain, Increased   troponin  1/3/24: Cardiology: will have her cardiac antiarrhythmic medications adjusted   and include low dose digoxin with hope in concert with amiodarone her heart   rhythm will be stabilized until she can be reassessed for any further   potential EP intervention.  1/3/24:  EKG: Sinus rhythm with premature atrial complexes.  Otherwise normal   ECG.  When compared with ECG of 27-DEC-2023  Questionable change in QRS axis  Treatment: Cardiology consult; Digoxin PO; Amiodarone PO; telemetry; Imaging;   EKG; Prinivil PO;    Thank you,  Leah Dexter BSN, R.N.  Clinical Documentation Improvement  [phone]  Options provided:  -- Chest pain/Dyspnea due to Atrial Fibrillation  -- Chest/Dyspnea pain due to other, please specify etiology.  -- Other - I will add my own diagnosis  -- Disagree - Not applicable / Not valid  -- Disagree - Clinically unable to determine / Unknown  -- Refer to Clinical Documentation Reviewer    PROVIDER RESPONSE TEXT:    This patient has Chest pain/Dyspnea due to Atrial Fibrillation.    Query created by: Leah Dexter on 2024 12:47 PM      Electronically signed by:  Isidro England DO 2024 4:25 PM

## 2024-01-16 NOTE — PROGRESS NOTES
Physician Progress Note      PATIENT:               NARESH JEAN  [facility] #:                  [id number]  :                       1940  ADMIT DATE:       2024 11:15 PM  DISCH DATE:        2024 3:59 PM  RESPONDING  PROVIDER #:        Isidro England DO          QUERY TEXT:    Pt admitted with Atypical chest pain and dyspnea. Pt noted to have Increased   Troponin If possible, please document in the progress notes and discharge   summary if you are evaluating and/or treating any of the following:    The medical record reflects the following:  Risk Factors: A-fib;  Clinical Indicators:  Per H&P: Increased troponin.  Dyspnea on exertion, Atypical chest pain  1/3/24: Troponin high sensitivity: 40 then 36  1/3/25: EKG: Sinus rhythm with premature atrial complexes.  Otherwise normal   ECG.  When compared with ECG of 27-DEC-2023 Questionable change in QRS axis  Treatment: Cardiology consult; Troponin monitoring; Amiodarone PO; digoxin PO;   Lisinopril PO; Telemetry    Thank you,  Leah Dexter BSN, R.N.  Clinical Documentation Improvement  [phone]  Options provided:  -- Non-ischemic myocardial injury due to Atrial fibrillation  -- Non-ischemic myocardial injury due to other, Please specify cause.  -- Non-ischemic myocardial injury, unspecified cause  -- Elevated troponin without myocardial injury  -- Other - I will add my own diagnosis  -- Disagree - Not applicable / Not valid  -- Disagree - Clinically unable to determine / Unknown  -- Refer to Clinical Documentation Reviewer    PROVIDER RESPONSE TEXT:    This patient has elevated troponin without myocardial injury.    Query created by: Leah Dexter on 2024 12:48 PM      Electronically signed by:  Isidro England DO 2024 10:01 AM

## 2024-03-14 ENCOUNTER — APPOINTMENT (OUTPATIENT)
Dept: GENERAL RADIOLOGY | Age: 84
End: 2024-03-14
Payer: MEDICARE

## 2024-03-14 ENCOUNTER — APPOINTMENT (OUTPATIENT)
Age: 84
End: 2024-03-14
Attending: INTERNAL MEDICINE
Payer: MEDICARE

## 2024-03-14 ENCOUNTER — HOSPITAL ENCOUNTER (OUTPATIENT)
Age: 84
Setting detail: OBSERVATION
Discharge: HOME | End: 2024-03-15
Attending: STUDENT IN AN ORGANIZED HEALTH CARE EDUCATION/TRAINING PROGRAM | Admitting: STUDENT IN AN ORGANIZED HEALTH CARE EDUCATION/TRAINING PROGRAM
Payer: MEDICARE

## 2024-03-14 ENCOUNTER — APPOINTMENT (OUTPATIENT)
Dept: NUCLEAR MEDICINE | Age: 84
End: 2024-03-14
Attending: INTERNAL MEDICINE
Payer: MEDICARE

## 2024-03-14 DIAGNOSIS — R07.9 CHEST PAIN, UNSPECIFIED TYPE: Primary | ICD-10-CM

## 2024-03-14 PROBLEM — F41.9 ANXIETY: Status: ACTIVE | Noted: 2024-03-14

## 2024-03-14 PROBLEM — Z72.0 TOBACCO ABUSE: Status: ACTIVE | Noted: 2024-03-14

## 2024-03-14 LAB
ALBUMIN SERPL-MCNC: 3.9 G/DL (ref 3.5–5.2)
ALP SERPL-CCNC: 76 U/L (ref 35–104)
ALT SERPL-CCNC: 18 U/L (ref 0–32)
ANION GAP SERPL CALCULATED.3IONS-SCNC: 9 MMOL/L (ref 7–16)
ANION GAP SERPL CALCULATED.3IONS-SCNC: 9 MMOL/L (ref 7–16)
AST SERPL-CCNC: 25 U/L (ref 0–31)
BASOPHILS # BLD: 0.02 K/UL (ref 0–0.2)
BASOPHILS NFR BLD: 0 % (ref 0–2)
BILIRUB SERPL-MCNC: 0.2 MG/DL (ref 0–1.2)
BNP SERPL-MCNC: 285 PG/ML (ref 0–450)
BUN SERPL-MCNC: 12 MG/DL (ref 6–23)
BUN SERPL-MCNC: 13 MG/DL (ref 6–23)
CALCIUM SERPL-MCNC: 9.2 MG/DL (ref 8.6–10.2)
CALCIUM SERPL-MCNC: 9.6 MG/DL (ref 8.6–10.2)
CHLORIDE SERPL-SCNC: 104 MMOL/L (ref 98–107)
CHLORIDE SERPL-SCNC: 104 MMOL/L (ref 98–107)
CO2 SERPL-SCNC: 27 MMOL/L (ref 22–29)
CO2 SERPL-SCNC: 28 MMOL/L (ref 22–29)
CREAT SERPL-MCNC: 1 MG/DL (ref 0.5–1)
CREAT SERPL-MCNC: 1 MG/DL (ref 0.5–1)
DIGOXIN SERPL-MCNC: 0.4 NG/ML (ref 0.8–2)
EKG ATRIAL RATE: 59 BPM
EKG ATRIAL RATE: 60 BPM
EKG P AXIS: 88 DEGREES
EKG P AXIS: 89 DEGREES
EKG P-R INTERVAL: 192 MS
EKG P-R INTERVAL: 216 MS
EKG Q-T INTERVAL: 464 MS
EKG Q-T INTERVAL: 464 MS
EKG QRS DURATION: 112 MS
EKG QRS DURATION: 114 MS
EKG QTC CALCULATION (BAZETT): 459 MS
EKG QTC CALCULATION (BAZETT): 464 MS
EKG R AXIS: -43 DEGREES
EKG R AXIS: -6 DEGREES
EKG T AXIS: 17 DEGREES
EKG T AXIS: 23 DEGREES
EKG VENTRICULAR RATE: 59 BPM
EKG VENTRICULAR RATE: 60 BPM
EOSINOPHIL # BLD: 0.05 K/UL (ref 0.05–0.5)
EOSINOPHILS RELATIVE PERCENT: 1 % (ref 0–6)
ERYTHROCYTE [DISTWIDTH] IN BLOOD BY AUTOMATED COUNT: 17.2 % (ref 11.5–15)
ERYTHROCYTE [DISTWIDTH] IN BLOOD BY AUTOMATED COUNT: 17.3 % (ref 11.5–15)
GFR SERPL CREATININE-BSD FRML MDRD: 57 ML/MIN/1.73M2
GFR SERPL CREATININE-BSD FRML MDRD: 58 ML/MIN/1.73M2
GLUCOSE SERPL-MCNC: 102 MG/DL (ref 74–99)
GLUCOSE SERPL-MCNC: 90 MG/DL (ref 74–99)
HCT VFR BLD AUTO: 40.4 % (ref 34–48)
HCT VFR BLD AUTO: 41 % (ref 34–48)
HGB BLD-MCNC: 12.2 G/DL (ref 11.5–15.5)
HGB BLD-MCNC: 12.5 G/DL (ref 11.5–15.5)
IMM GRANULOCYTES # BLD AUTO: <0.03 K/UL (ref 0–0.58)
IMM GRANULOCYTES NFR BLD: 0 % (ref 0–5)
LYMPHOCYTES NFR BLD: 1.06 K/UL (ref 1.5–4)
LYMPHOCYTES RELATIVE PERCENT: 18 % (ref 20–42)
MAGNESIUM SERPL-MCNC: 2 MG/DL (ref 1.6–2.6)
MCH RBC QN AUTO: 28.5 PG (ref 26–35)
MCH RBC QN AUTO: 28.5 PG (ref 26–35)
MCHC RBC AUTO-ENTMCNC: 30.2 G/DL (ref 32–34.5)
MCHC RBC AUTO-ENTMCNC: 30.5 G/DL (ref 32–34.5)
MCV RBC AUTO: 93.6 FL (ref 80–99.9)
MCV RBC AUTO: 94.4 FL (ref 80–99.9)
MONOCYTES NFR BLD: 0.61 K/UL (ref 0.1–0.95)
MONOCYTES NFR BLD: 10 % (ref 2–12)
NEUTROPHILS NFR BLD: 70 % (ref 43–80)
NEUTS SEG NFR BLD: 4.1 K/UL (ref 1.8–7.3)
PLATELET # BLD AUTO: 209 K/UL (ref 130–450)
PLATELET # BLD AUTO: 224 K/UL (ref 130–450)
PMV BLD AUTO: 10.4 FL (ref 7–12)
PMV BLD AUTO: 10.6 FL (ref 7–12)
POTASSIUM SERPL-SCNC: 3.8 MMOL/L (ref 3.5–5)
POTASSIUM SERPL-SCNC: 4.4 MMOL/L (ref 3.5–5)
PROT SERPL-MCNC: 6.6 G/DL (ref 6.4–8.3)
RBC # BLD AUTO: 4.28 M/UL (ref 3.5–5.5)
RBC # BLD AUTO: 4.38 M/UL (ref 3.5–5.5)
SODIUM SERPL-SCNC: 140 MMOL/L (ref 132–146)
SODIUM SERPL-SCNC: 141 MMOL/L (ref 132–146)
TROPONIN I SERPL HS-MCNC: 33 NG/L (ref 0–9)
TROPONIN I SERPL HS-MCNC: 35 NG/L (ref 0–9)
TROPONIN I SERPL HS-MCNC: 37 NG/L (ref 0–9)
WBC OTHER # BLD: 4.9 K/UL (ref 4.5–11.5)
WBC OTHER # BLD: 5.9 K/UL (ref 4.5–11.5)

## 2024-03-14 PROCEDURE — 36415 COLL VENOUS BLD VENIPUNCTURE: CPT

## 2024-03-14 PROCEDURE — 85027 COMPLETE CBC AUTOMATED: CPT

## 2024-03-14 PROCEDURE — 80053 COMPREHEN METABOLIC PANEL: CPT

## 2024-03-14 PROCEDURE — 2580000003 HC RX 258: Performed by: NURSE PRACTITIONER

## 2024-03-14 PROCEDURE — G0378 HOSPITAL OBSERVATION PER HR: HCPCS

## 2024-03-14 PROCEDURE — 6370000000 HC RX 637 (ALT 250 FOR IP): Performed by: INTERNAL MEDICINE

## 2024-03-14 PROCEDURE — 80162 ASSAY OF DIGOXIN TOTAL: CPT

## 2024-03-14 PROCEDURE — APPSS45 APP SPLIT SHARED TIME 31-45 MINUTES: Performed by: NURSE PRACTITIONER

## 2024-03-14 PROCEDURE — 84484 ASSAY OF TROPONIN QUANT: CPT

## 2024-03-14 PROCEDURE — 6370000000 HC RX 637 (ALT 250 FOR IP)

## 2024-03-14 PROCEDURE — 99222 1ST HOSP IP/OBS MODERATE 55: CPT | Performed by: STUDENT IN AN ORGANIZED HEALTH CARE EDUCATION/TRAINING PROGRAM

## 2024-03-14 PROCEDURE — 3430000000 HC RX DIAGNOSTIC RADIOPHARMACEUTICAL: Performed by: RADIOLOGY

## 2024-03-14 PROCEDURE — 78452 HT MUSCLE IMAGE SPECT MULT: CPT

## 2024-03-14 PROCEDURE — 6370000000 HC RX 637 (ALT 250 FOR IP): Performed by: NURSE PRACTITIONER

## 2024-03-14 PROCEDURE — 85025 COMPLETE CBC W/AUTO DIFF WBC: CPT

## 2024-03-14 PROCEDURE — 93017 CV STRESS TEST TRACING ONLY: CPT

## 2024-03-14 PROCEDURE — 83880 ASSAY OF NATRIURETIC PEPTIDE: CPT

## 2024-03-14 PROCEDURE — 71045 X-RAY EXAM CHEST 1 VIEW: CPT

## 2024-03-14 PROCEDURE — 80048 BASIC METABOLIC PNL TOTAL CA: CPT

## 2024-03-14 PROCEDURE — A9500 TC99M SESTAMIBI: HCPCS | Performed by: RADIOLOGY

## 2024-03-14 PROCEDURE — 99285 EMERGENCY DEPT VISIT HI MDM: CPT

## 2024-03-14 PROCEDURE — 6360000002 HC RX W HCPCS: Performed by: INTERNAL MEDICINE

## 2024-03-14 PROCEDURE — 83735 ASSAY OF MAGNESIUM: CPT

## 2024-03-14 RX ORDER — AMIODARONE HYDROCHLORIDE 200 MG/1
200 TABLET ORAL EVERY MORNING
Status: DISCONTINUED | OUTPATIENT
Start: 2024-03-14 | End: 2024-03-15 | Stop reason: HOSPADM

## 2024-03-14 RX ORDER — SODIUM CHLORIDE 0.9 % (FLUSH) 0.9 %
5-40 SYRINGE (ML) INJECTION PRN
Status: DISCONTINUED | OUTPATIENT
Start: 2024-03-14 | End: 2024-03-15 | Stop reason: HOSPADM

## 2024-03-14 RX ORDER — ONDANSETRON 4 MG/1
4 TABLET, ORALLY DISINTEGRATING ORAL EVERY 8 HOURS PRN
Status: DISCONTINUED | OUTPATIENT
Start: 2024-03-14 | End: 2024-03-15 | Stop reason: HOSPADM

## 2024-03-14 RX ORDER — SODIUM CHLORIDE 9 MG/ML
INJECTION, SOLUTION INTRAVENOUS CONTINUOUS
Status: DISCONTINUED | OUTPATIENT
Start: 2024-03-14 | End: 2024-03-15 | Stop reason: HOSPADM

## 2024-03-14 RX ORDER — POLYETHYLENE GLYCOL 3350 17 G/17G
17 POWDER, FOR SOLUTION ORAL DAILY PRN
Status: DISCONTINUED | OUTPATIENT
Start: 2024-03-14 | End: 2024-03-15 | Stop reason: HOSPADM

## 2024-03-14 RX ORDER — SODIUM CHLORIDE 9 MG/ML
INJECTION, SOLUTION INTRAVENOUS PRN
Status: DISCONTINUED | OUTPATIENT
Start: 2024-03-14 | End: 2024-03-15 | Stop reason: HOSPADM

## 2024-03-14 RX ORDER — BUSPIRONE HYDROCHLORIDE 10 MG/1
10 TABLET ORAL 2 TIMES DAILY
Status: DISCONTINUED | OUTPATIENT
Start: 2024-03-14 | End: 2024-03-15 | Stop reason: HOSPADM

## 2024-03-14 RX ORDER — POTASSIUM CHLORIDE 7.45 MG/ML
10 INJECTION INTRAVENOUS PRN
Status: DISCONTINUED | OUTPATIENT
Start: 2024-03-14 | End: 2024-03-15 | Stop reason: HOSPADM

## 2024-03-14 RX ORDER — BUSPIRONE HYDROCHLORIDE 10 MG/1
TABLET ORAL
COMMUNITY
Start: 2024-02-20

## 2024-03-14 RX ORDER — ONDANSETRON 2 MG/ML
4 INJECTION INTRAMUSCULAR; INTRAVENOUS EVERY 6 HOURS PRN
Status: DISCONTINUED | OUTPATIENT
Start: 2024-03-14 | End: 2024-03-15 | Stop reason: HOSPADM

## 2024-03-14 RX ORDER — ASPIRIN 81 MG/1
81 TABLET, CHEWABLE ORAL DAILY
Status: DISCONTINUED | OUTPATIENT
Start: 2024-03-15 | End: 2024-03-15 | Stop reason: HOSPADM

## 2024-03-14 RX ORDER — MAGNESIUM SULFATE IN WATER 40 MG/ML
2000 INJECTION, SOLUTION INTRAVENOUS PRN
Status: DISCONTINUED | OUTPATIENT
Start: 2024-03-14 | End: 2024-03-15 | Stop reason: HOSPADM

## 2024-03-14 RX ORDER — NITROGLYCERIN 0.4 MG/1
0.4 TABLET SUBLINGUAL EVERY 5 MIN PRN
Status: DISCONTINUED | OUTPATIENT
Start: 2024-03-14 | End: 2024-03-15 | Stop reason: HOSPADM

## 2024-03-14 RX ORDER — LISINOPRIL 5 MG/1
5 TABLET ORAL EVERY MORNING
Status: DISCONTINUED | OUTPATIENT
Start: 2024-03-14 | End: 2024-03-15 | Stop reason: HOSPADM

## 2024-03-14 RX ORDER — SODIUM CHLORIDE 0.9 % (FLUSH) 0.9 %
5-40 SYRINGE (ML) INJECTION EVERY 12 HOURS SCHEDULED
Status: DISCONTINUED | OUTPATIENT
Start: 2024-03-14 | End: 2024-03-15 | Stop reason: HOSPADM

## 2024-03-14 RX ORDER — FERROUS SULFATE 325(65) MG
325 TABLET ORAL EVERY OTHER DAY
Status: DISCONTINUED | OUTPATIENT
Start: 2024-03-14 | End: 2024-03-15 | Stop reason: HOSPADM

## 2024-03-14 RX ORDER — ACETAMINOPHEN 650 MG/1
650 SUPPOSITORY RECTAL EVERY 6 HOURS PRN
Status: DISCONTINUED | OUTPATIENT
Start: 2024-03-14 | End: 2024-03-15 | Stop reason: HOSPADM

## 2024-03-14 RX ORDER — ACETAMINOPHEN 325 MG/1
650 TABLET ORAL EVERY 6 HOURS PRN
Status: DISCONTINUED | OUTPATIENT
Start: 2024-03-14 | End: 2024-03-15 | Stop reason: HOSPADM

## 2024-03-14 RX ORDER — POTASSIUM CHLORIDE 20 MEQ/1
40 TABLET, EXTENDED RELEASE ORAL PRN
Status: DISCONTINUED | OUTPATIENT
Start: 2024-03-14 | End: 2024-03-15 | Stop reason: HOSPADM

## 2024-03-14 RX ORDER — DIGOXIN 125 MCG
62.5 TABLET ORAL DAILY
Status: DISCONTINUED | OUTPATIENT
Start: 2024-03-14 | End: 2024-03-15 | Stop reason: HOSPADM

## 2024-03-14 RX ORDER — TETRAKIS(2-METHOXYISOBUTYLISOCYANIDE)COPPER(I) TETRAFLUOROBORATE 1 MG/ML
30 INJECTION, POWDER, LYOPHILIZED, FOR SOLUTION INTRAVENOUS
Status: COMPLETED | OUTPATIENT
Start: 2024-03-14 | End: 2024-03-14

## 2024-03-14 RX ORDER — ASPIRIN 81 MG/1
324 TABLET, CHEWABLE ORAL ONCE
Status: COMPLETED | OUTPATIENT
Start: 2024-03-14 | End: 2024-03-14

## 2024-03-14 RX ORDER — DIGOXIN 125 MCG
62.5 TABLET ORAL
Status: DISCONTINUED | OUTPATIENT
Start: 2024-03-15 | End: 2024-03-14

## 2024-03-14 RX ORDER — TETRAKIS(2-METHOXYISOBUTYLISOCYANIDE)COPPER(I) TETRAFLUOROBORATE 1 MG/ML
10 INJECTION, POWDER, LYOPHILIZED, FOR SOLUTION INTRAVENOUS
Status: COMPLETED | OUTPATIENT
Start: 2024-03-14 | End: 2024-03-14

## 2024-03-14 RX ORDER — DIGOXIN 125 MCG
125 TABLET ORAL DAILY
Status: DISCONTINUED | OUTPATIENT
Start: 2024-03-14 | End: 2024-03-14

## 2024-03-14 RX ORDER — REGADENOSON 0.08 MG/ML
0.4 INJECTION, SOLUTION INTRAVENOUS
Status: COMPLETED | OUTPATIENT
Start: 2024-03-14 | End: 2024-03-14

## 2024-03-14 RX ADMIN — ASPIRIN 81 MG CHEWABLE TABLET 324 MG: 81 TABLET CHEWABLE at 04:07

## 2024-03-14 RX ADMIN — Medication 10 MILLICURIE: at 09:27

## 2024-03-14 RX ADMIN — BUSPIRONE HYDROCHLORIDE 10 MG: 10 TABLET ORAL at 20:49

## 2024-03-14 RX ADMIN — FERROUS SULFATE TAB 325 MG (65 MG ELEMENTAL FE) 325 MG: 325 (65 FE) TAB at 15:06

## 2024-03-14 RX ADMIN — Medication 30 MILLICURIE: at 09:27

## 2024-03-14 RX ADMIN — LISINOPRIL 5 MG: 5 TABLET ORAL at 15:06

## 2024-03-14 RX ADMIN — SODIUM CHLORIDE, PRESERVATIVE FREE 10 ML: 5 INJECTION INTRAVENOUS at 20:49

## 2024-03-14 RX ADMIN — BUSPIRONE HYDROCHLORIDE 10 MG: 10 TABLET ORAL at 15:05

## 2024-03-14 RX ADMIN — SODIUM CHLORIDE, PRESERVATIVE FREE 10 ML: 5 INJECTION INTRAVENOUS at 15:14

## 2024-03-14 RX ADMIN — DIGOXIN 62.5 MCG: 0.12 TABLET ORAL at 15:06

## 2024-03-14 RX ADMIN — SODIUM CHLORIDE: 9 INJECTION, SOLUTION INTRAVENOUS at 07:43

## 2024-03-14 RX ADMIN — APIXABAN 5 MG: 5 TABLET, FILM COATED ORAL at 20:49

## 2024-03-14 RX ADMIN — APIXABAN 5 MG: 5 TABLET, FILM COATED ORAL at 15:06

## 2024-03-14 RX ADMIN — AMIODARONE HYDROCHLORIDE 200 MG: 200 TABLET ORAL at 15:05

## 2024-03-14 RX ADMIN — REGADENOSON 0.4 MG: 0.08 INJECTION, SOLUTION INTRAVENOUS at 10:22

## 2024-03-14 NOTE — PROGRESS NOTES
Page sent to Dr. Winters with new consult.    Electronically signed by Anni Valle RN on 3/14/2024 at 8:16 AM

## 2024-03-14 NOTE — PLAN OF CARE
Problem: Discharge Planning  Goal: Discharge to home or other facility with appropriate resources  Outcome: Progressing  Flowsheets (Taken 3/14/2024 0606)  Discharge to home or other facility with appropriate resources:   Identify barriers to discharge with patient and caregiver   Arrange for needed discharge resources and transportation as appropriate

## 2024-03-14 NOTE — PLAN OF CARE
Patient is an 84-year-old female who was admitted overnight due to left-sided chest pressure.  She has history of A-fib on Eliquis.    Left-sided chest pain.  EKG unremarkable.  Troponin 33>37>35.  Cardiology consulted.  For stress test  A-fib.  Currently rate controlled.  Continue amiodarone and digoxin.  Dig level is subtherapeutic at 0.4 (0.8-2).  Appreciate cardiology's input.  Continue Eliquis  Anxiety.  Continue home meds  Tobacco abuse.  Cessation counseled

## 2024-03-14 NOTE — PLAN OF CARE
Problem: Discharge Planning  Goal: Discharge to home or other facility with appropriate resources  3/14/2024 0802 by Franklin Arreguin, RN  Outcome: Progressing  3/14/2024 0607 by Karla Nails RN  Outcome: Progressing  Flowsheets (Taken 3/14/2024 0606)  Discharge to home or other facility with appropriate resources:   Identify barriers to discharge with patient and caregiver   Arrange for needed discharge resources and transportation as appropriate     Problem: Safety - Adult  Goal: Free from fall injury  Outcome: Progressing

## 2024-03-14 NOTE — PROGRESS NOTES
Unable to complete visit due to patient not in the room while rounding.  remains available for support.

## 2024-03-14 NOTE — CARE COORDINATION
Social work / Discharge planning:            Patient is from home alone and lives in a one story home.     She has a cane and ww.   Patient uses a food delivery service and [employer]  for laundry.    She has used [facility] in the past.      Per IDR, plan is for  stress test.     Patient is on room air.    Cardiology consulted.    Electronically signed by ROBERTO Levine on 3/14/2024 at 11:31 AM

## 2024-03-14 NOTE — ACP (ADVANCE CARE PLANNING)
Advance Care Planning   Healthcare Decision Maker:    Primary Decision Maker: KELSEY BAEZ - Cole - [phone]    Click here to complete Healthcare Decision Makers including selection of the Healthcare Decision Maker Relationship (ie \"Primary\").

## 2024-03-14 NOTE — H&P
[facility] History and Physical      CHIEF COMPLAINT: Chest pain    History of Present Illness: This is an 84-year-old female with PMH significant for breast cancer s/p mastectomy, chronic atrial fibrillation, palpitations, anxiety, and tobacco abuse.  Patient reports yesterday waking up feeling nauseated and lightheaded all day.  States that it did not affect her appetite and had no emesis.  Admits to chest pain starting after dinner.  Describes constant left chest pain as pressure that radiates to midsternal area.  Rates it as 7 out of 10.  Denies recent illness, fever, chills, emesis, shortness of breath, diarrhea, recent trauma, lifting, and changes in bowel/urinary habits.  Does admit to having a dry cough that is greater at night and worse when laying flat.  Also admits to smoking about 6 cigarettes a day.  Troponin 33 and then 37.  Chest x-ray negative.  Recently admitted in January for chest pain.  Seen by cardiology at that time and coronary CT scan was fairly unremarkable.  Symptoms thought to be secondary to anxiety.  Patient did follow-up with PCP and was started on Zoloft and BuSpar.  Patient given aspirin 324 mg in ED.  Will observe for further evaluation and treatment.    Informant(s) for H&P: Patient and chart review    REVIEW OF SYSTEMS:  A comprehensive review of systems was negative except for: what is in the HPI      PMH:  Past Medical History:   Diagnosis Date    Breast cancer (HCC)     Chronic a-fib (HCC)     Heart palpitations     a fib       Surgical History:  Past Surgical History:   Procedure Laterality Date    COLONOSCOPY N/A 9/22/2023    COLONOSCOPY POLYPECTOMY SNARE/COLD BIOPSY performed by Rob Deutsch MD at [facility] ENDOSCOPY    KNEE SURGERY      MASTECTOMY Left     UPPER GASTROINTESTINAL ENDOSCOPY N/A 9/20/2023    EGD BIOPSY performed by Rbo Deutsch MD at [facility] ENDOSCOPY       Medications Prior to Admission:    Prior to Admission medications    Medication Sig

## 2024-03-14 NOTE — ED PROVIDER NOTES
[facility] MED SURG  EMERGENCY DEPARTMENT ENCOUNTER        Pt Name: Agueda Sierra  MRN: [id number]  Birthdate 1940  Date of evaluation: 3/14/2024  Provider: Mark Samaniego MD  PCP: Isidro England DO  Note Started: 1:12 AM EDT 3/14/24    CHIEF COMPLAINT       Chief Complaint   Patient presents with    Chest Pain       HISTORY OF PRESENT ILLNESS: 1 or more Elements   History From: Patient.    Limitations to history : None    Agueda Sierra is a 84 y.o. female who presents to the ED with complaints of chest pain.  Patient states that while having dinner at 6:00, she started having left-sided chest pressure.  She still complains of left-sided chest pressure upon arrival to the ED.  She denies any shortness of breath.  Patient does have a history of atrial fibrillation and is on Eliquis.  Patient is states that she was recently admitted here in January, was done cardiac cath did not reveal any ischemia.  Patient denies any dizziness or lightheadedness now in the ED.  She denies any abdominal pain, constipation, diarrhea, urinary symptoms or any other complaints now in the ED.      Nursing Notes were all reviewed and agreed with or any disagreements were addressed in the HPI.    ROS:   Pertinent positives and negatives are stated within HPI, all other systems reviewed and are negative.    --------------------------------------------- PAST HISTORY ---------------------------------------------  Past Medical History:  has a past medical history of Breast cancer (HCC), Chronic a-fib (HCC), and Heart palpitations.    Past Surgical History:  has a past surgical history that includes knee surgery; Mastectomy (Left); Upper gastrointestinal endoscopy (N/A, 9/20/2023); and Colonoscopy (N/A, 9/22/2023).    Social History:  reports that she has been smoking cigarettes. She does not have any smokeless tobacco history on file. She reports that she does not drink alcohol and does not use drugs.    Family History: family

## 2024-03-14 NOTE — ED NOTES
ED to Inpatient Handoff Report    Notified Karla MARIN that electronic handoff available and patient ready for transport to room 0603.    Safety Risks: Risk of falls    Patient in Restraints: no    Constant Observer or Patient : no    Telemetry Monitoring Ordered :No           Order to transfer to unit without monitor:N/A    Last MEWS: 1 Time completed: 0521    Deterioration Index Score:   Predictive Model Details          26 (Normal)  Factor Value    Calculated 3/14/2024 05:21 51% Age 84 years old    Deterioration Index Model 21% Respiratory rate 20     17% Systolic 168     9% Potassium 4.4 mmol/L     1% Pulse oximetry 98 %     1% Sodium 140 mmol/L     0% Hematocrit 41.0 %     0% Pulse 69     0% WBC count 5.9 k/uL     0% Temperature 98.1 °F (36.7 °C)        Vitals:    03/14/24 0021 03/14/24 0521   BP: 101/62 (!) 168/73   Pulse: 79 69   Resp: 18 20   Temp: 97.9 °F (36.6 °C) 98.1 °F (36.7 °C)   TempSrc: Oral Oral   SpO2: 97% 98%   Weight: 84.1 kg (185 lb 6.5 oz)    Height: 1.727 m (5' 7.99\")          Opportunity for questions and clarification was provided.

## 2024-03-15 VITALS
WEIGHT: 184 LBS | BODY MASS INDEX: 27.89 KG/M2 | TEMPERATURE: 97.8 F | HEIGHT: 68 IN | HEART RATE: 65 BPM | DIASTOLIC BLOOD PRESSURE: 80 MMHG | OXYGEN SATURATION: 94 % | RESPIRATION RATE: 18 BRPM | SYSTOLIC BLOOD PRESSURE: 148 MMHG

## 2024-03-15 LAB
ANION GAP SERPL CALCULATED.3IONS-SCNC: 8 MMOL/L (ref 7–16)
BUN SERPL-MCNC: 13 MG/DL (ref 6–23)
CALCIUM SERPL-MCNC: 9.1 MG/DL (ref 8.6–10.2)
CHLORIDE SERPL-SCNC: 108 MMOL/L (ref 98–107)
CO2 SERPL-SCNC: 26 MMOL/L (ref 22–29)
CREAT SERPL-MCNC: 0.9 MG/DL (ref 0.5–1)
ECHO BSA: 2.01 M2
GFR SERPL CREATININE-BSD FRML MDRD: >60 ML/MIN/1.73M2
GLUCOSE SERPL-MCNC: 86 MG/DL (ref 74–99)
POTASSIUM SERPL-SCNC: 4.1 MMOL/L (ref 3.5–5)
SODIUM SERPL-SCNC: 142 MMOL/L (ref 132–146)
STRESS BASELINE DIAS BP: 69 MMHG
STRESS BASELINE HR: 63 BPM
STRESS BASELINE SYS BP: 109 MMHG
STRESS ESTIMATED WORKLOAD: 1 METS
STRESS PEAK DIAS BP: 60 MMHG
STRESS PEAK SYS BP: 153 MMHG
STRESS PERCENT HR ACHIEVED: 54 %
STRESS POST PEAK HR: 75 BPM
STRESS RATE PRESSURE PRODUCT: NORMAL BPM*MMHG
STRESS ST DEPRESSION: -1.1 MM
STRESS TARGET HR: 138 BPM

## 2024-03-15 PROCEDURE — 6370000000 HC RX 637 (ALT 250 FOR IP): Performed by: NURSE PRACTITIONER

## 2024-03-15 PROCEDURE — 6370000000 HC RX 637 (ALT 250 FOR IP): Performed by: INTERNAL MEDICINE

## 2024-03-15 PROCEDURE — 36415 COLL VENOUS BLD VENIPUNCTURE: CPT

## 2024-03-15 PROCEDURE — G0378 HOSPITAL OBSERVATION PER HR: HCPCS

## 2024-03-15 PROCEDURE — 80048 BASIC METABOLIC PNL TOTAL CA: CPT

## 2024-03-15 RX ADMIN — LISINOPRIL 5 MG: 5 TABLET ORAL at 10:38

## 2024-03-15 RX ADMIN — BUSPIRONE HYDROCHLORIDE 10 MG: 10 TABLET ORAL at 10:38

## 2024-03-15 RX ADMIN — DIGOXIN 62.5 MCG: 0.12 TABLET ORAL at 10:38

## 2024-03-15 RX ADMIN — AMIODARONE HYDROCHLORIDE 200 MG: 200 TABLET ORAL at 10:38

## 2024-03-15 RX ADMIN — ASPIRIN 81 MG CHEWABLE TABLET 81 MG: 81 TABLET CHEWABLE at 10:38

## 2024-03-15 RX ADMIN — APIXABAN 5 MG: 5 TABLET, FILM COATED ORAL at 10:38

## 2024-03-15 NOTE — PROGRESS NOTES
SPIRITUAL HEALTH SERVICES - [facility] Encounter    Name: Agueda Sierra                  Referral: Routine Visit    Sacraments  Anointed (Last Rites): Yes  Apostolic [place]: No  Confession: No  Communion: No     Assessment:  Patient receptive to  visit.      Intervention:   provided spiritual support and sacramental ministry for patient.     Outcome:  Patient expressed gratitude for visit.    Plan:  Chaplains will remain available to offer spiritual and emotional support as needed.      Electronically signed by Chaplain Juan Miguel, on 3/15/2024 at 4:07 PM.  Spiritual Care Department  [facility]  [phone]

## 2024-03-15 NOTE — CONSULTS
progressive polyneuropathy G60.3    Disability of walking R26.2    Elevated lactic acid level R79.89    Persistent atrial fibrillation (HCC) I48.19    Chronic anemia D64.9    Diverticulosis of colon K57.30    Permanent atrial fibrillation (HCC) I48.21    Acute blood loss anemia D62    Hypoxia R09.02    Paroxysmal atrial fibrillation (HCC) I48.0    Chest pain R07.9    Anxiety F41.9    Tobacco abuse Z72.0       Plan:  Mrs. Sierra we will have her cardiac antiarrhythmic medications adjusted and at this point have included low-dose digoxin to maintain a digoxin level of 0.4 - 0.9 ng/mL with the hope in concert with amiodarone her heart rhythm will be stabilized until she can be reassessed for any further potential electrophysiologic intervention.  Continue to monitor closely and adjust electrolytes in accordance with laboratory results.    I have spent more than 55 minutes face to face with Agueda Sierra,and reviewing notes and laboratory data with greater than 50% of this time instructing and counseling the patient  regarding my findings and recommendations and I have answered all questions as posed to me by Ms. Sierra. Thank you, Isidro England DO for allowing me to consult in the care of this patient.    Mitch Winters,  , FACP, FACC, [facility]    NOTE:  This report was transcribed using voice recognition software.  Every effort was made to ensure accuracy; however, inadvertent computerized transcription errors may be present.

## 2024-03-15 NOTE — DISCHARGE SUMMARY
[facility] Hospitalist Physician Discharge Summary       No follow-up provider specified.    Activity level: As tolerated     Dispo: Home      Condition on discharge: Stable     Patient ID:  Agueda Sierra  [id number]  84 y.o.  1940    Admit date: 3/14/2024    Discharge date and time:  3/15/2024  2:12 PM    Admission Diagnoses: Principal Problem:    Chest pain  Active Problems:    Permanent atrial fibrillation (HCC)    Anxiety    Tobacco abuse  Resolved Problems:    * No resolved hospital problems. *      Discharge Diagnoses: Principal Problem:    Chest pain  Active Problems:    Permanent atrial fibrillation (HCC)    Anxiety    Tobacco abuse  Resolved Problems:    * No resolved hospital problems. *      Consults:  IP CONSULT TO CARDIOLOGY    Hospital Course:   Patient Agueda Sierra is a 84 y.o. presented with Chest pain [R07.9]  Chest pain, unspecified type [R07.9]    Patient is an 84-year-old female who was admitted due to left-sided chest pressure.  She has history of A-fib on Eliquis.  EKG was unremarkable.  Troponins were elevated at 33>37>35.  Cardiology consulted, underwent nuclear stress test which was unremarkable.  Patient's symptoms improved and will be discharged home in stable condition.    Discharge Exam:    General Appearance: alert and oriented to person, place and time and in no acute distress  Skin: warm and dry  Head: normocephalic and atraumatic  Eyes: pupils equal, round, and reactive to light, extraocular eye movements intact, conjunctivae normal  Neck: neck supple and non tender without mass   Pulmonary/Chest: clear to auscultation bilaterally- no wheezes, rales or rhonchi, normal air movement, no respiratory distress  Cardiovascular: normal rate, normal S1 and S2 and no carotid bruits  Abdomen: soft, non-tender, non-distended, normal bowel sounds, no masses or organomegaly  Extremities: no cyanosis, no clubbing and no edema  Neurologic: no cranial nerve deficit and speech

## 2024-03-15 NOTE — CARE COORDINATION
Social work / Discharge planning:          Discharge plan is home .    No needs identified.    Electronically signed by ROBERTO Levine on 3/15/2024 at 7:19 AM

## 2024-03-15 NOTE — PLAN OF CARE
Problem: Discharge Planning  Goal: Discharge to home or other facility with appropriate resources  Outcome: Completed     Problem: Safety - Adult  Goal: Free from fall injury  Outcome: Completed     Problem: Chronic Conditions and Co-morbidities  Goal: Patient's chronic conditions and co-morbidity symptoms are monitored and maintained or improved  Outcome: Completed

## 2024-03-18 ENCOUNTER — CARE COORDINATION (OUTPATIENT)
Dept: CARE COORDINATION | Age: 84
End: 2024-03-18

## 2024-03-18 LAB
EKG ATRIAL RATE: 59 BPM
EKG ATRIAL RATE: 60 BPM
EKG P AXIS: 88 DEGREES
EKG P AXIS: 89 DEGREES
EKG P-R INTERVAL: 192 MS
EKG P-R INTERVAL: 216 MS
EKG Q-T INTERVAL: 464 MS
EKG Q-T INTERVAL: 464 MS
EKG QRS DURATION: 112 MS
EKG QRS DURATION: 114 MS
EKG QTC CALCULATION (BAZETT): 459 MS
EKG QTC CALCULATION (BAZETT): 464 MS
EKG R AXIS: -43 DEGREES
EKG R AXIS: -6 DEGREES
EKG T AXIS: 17 DEGREES
EKG T AXIS: 23 DEGREES
EKG VENTRICULAR RATE: 59 BPM
EKG VENTRICULAR RATE: 60 BPM

## 2024-03-18 NOTE — CARE COORDINATION
Care Transitions Outreach Attempt    Call within 2 business days of discharge: Yes     FIRST attempt made to contact patient for [facility] P[facility] Initial Care Transition call. Unable to reach patient. CTN left HIPAA compliant message requesting return call.      Patient: Agueda Sierra Patient : 1940 MRN: [id number]  Reason for Admission: [facility] 3/14-3/15/24 CP  Discharge Date: 3/15/24 RARS: Readmission Risk Score: 15.9    Last Discharge Facility       Date Complaint Diagnosis Description Type Department Provider    3/14/24 Chest Pain Chest pain, unspecified type ED to Hosp-Admission (Discharged) (ADMITTED) [facility] Cher Loyola MD; Len...          Was this an external facility discharge? No Discharge Facility Name: [facility]    Noted following upcoming appointments from discharge chart review:   BS follow up appointment(s): No future appointments.  Non-BS  follow up appointment(s):   Follow-up Information       Follow up With Specialties Details Why Contact Info    Isidro England,  Family Medicine Schedule an appointment as soon as possible for a visit in 1 week(s) Hosp F/U [address] [facility] [address] [place] [id number] [phone]      Isidro England DO Family Medicine   [address] [place] [address] [address] [address] [phone]      Mitch Winters,  Cardiology Schedule an appointment as soon as possible for a visit in 1 week(s) Hosp F/U [address] [facility] [address]  [phone]             Brittney Jolley LPN

## 2024-03-19 ENCOUNTER — CARE COORDINATION (OUTPATIENT)
Dept: CARE COORDINATION | Age: 84
End: 2024-03-19

## 2024-03-19 NOTE — CARE COORDINATION
Care Transitions Outreach Attempt    Call within 2 business days of discharge: Yes     SECOND attempt made to contact patient for [facility] P[facility] Initial Care Transition call. Unable to reach patient. CTN left HIPAA compliant message requesting return call. If no response from patient by end of day, CTN will sign off.    Patient: Agueda Sierra Patient : 1940 MRN: [id number]  Reason for Admission: [facility] 3/14-3/15/24 CP  Discharge Date: 3/15/24       RARS: Readmission Risk Score: 15.9    Last Discharge Facility       Date Complaint Diagnosis Description Type Department Provider    3/14/24 Chest Pain Chest pain, unspecified type ED to Hosp-Admission (Discharged) (ADMITTED) Cher Maravilla MD; Len...        Was this an external facility discharge? No Discharge Facility Name: [facility]    Noted following upcoming appointments from discharge chart review:   [facility] follow up appointment(s): No future appointments.  Non-BS  follow up appointment(s):   Follow-up Information       Follow up With Specialties Details Why Contact Info    Isidro England, DO Family Medicine Schedule an appointment as soon as possible for a visit in 1 week(s) Hosp F/U [address] [facility]   [facility] [address] [phone]      Mitch Winters,  Cardiology Schedule an appointment as soon as possible for a visit in 1 week(s) Hosp F/U [address] [facility] [address] [phone]               Brittney Jolley LPN

## 2024-03-19 NOTE — CARE COORDINATION
Care Transitions Initial Follow Up Call    Call within 2 business days of discharge: Yes    Patient Current Location:  Home: [address] [place], [place] [address]    Patient  contacted the  CTN  by telephone to perform post hospital discharge assessment. Verified name and  with patient as identifiers. Provided introduction to self, and explanation of the Care Transition Nurse role.     Patient: Agueda Sierra Patient : 1940   MRN: [id number]  Reason for Admission: [facility] 3/14-3/15/24 CP  Discharge Date: 3/15/24 RARS: Readmission Risk Score: 15.9    Last Discharge Facility       Date Complaint Diagnosis Description Type Department Provider    3/14/24 Chest Pain Chest pain, unspecified type ED to Hosp-Admission (Discharged) (ADMITTED) Cher Maravilla MD; Len...          Was this an external facility discharge? No Discharge Facility: [facility]    Challenges to be reviewed by the provider   Additional needs identified to be addressed with provider: No  none               Method of communication with provider: none.    CTN placed call to Patient for [facility] P[facility] Initial Care Transition call, Post Hospital discharge. Pt reports no return of symptoms since discharge.     Pt taking new medications as prescribed. Pt has reported cough when taking Lisinopril. Discussed with Pt that cough is a common side effect of Lisinopril and to contact Cardiologist if persists, or becomes bothersome. Pt shares that she continues to smoke.     Pt has not scheduled PCP HFU, will call today. Pt has call out to Cardiologist office to schedule appt.     Pt denies Transportation, Home, or Medication needs. Patient to contact PCP/Specialist or present to ED for new or worsening symptoms. Pt v/u.    Care Transition Nurse reviewed discharge instructions, medical action plan, and red flags with patient who verbalized understanding. The patient was given an opportunity to ask questions and does not have any further questions or

## 2024-04-04 ENCOUNTER — APPOINTMENT (OUTPATIENT)
Dept: GENERAL RADIOLOGY | Age: 84
DRG: 189 | End: 2024-04-04
Payer: MEDICARE

## 2024-04-04 ENCOUNTER — HOSPITAL ENCOUNTER (INPATIENT)
Age: 84
LOS: 3 days | Discharge: HOME | DRG: 189 | End: 2024-04-08
Attending: STUDENT IN AN ORGANIZED HEALTH CARE EDUCATION/TRAINING PROGRAM | Admitting: FAMILY MEDICINE
Payer: MEDICARE

## 2024-04-04 DIAGNOSIS — R55 NEAR SYNCOPE: Primary | ICD-10-CM

## 2024-04-04 DIAGNOSIS — R06.00 DYSPNEA, UNSPECIFIED TYPE: ICD-10-CM

## 2024-04-04 DIAGNOSIS — J96.01 ACUTE RESPIRATORY FAILURE WITH HYPOXIA (HCC): ICD-10-CM

## 2024-04-04 LAB
BASOPHILS # BLD: 0.01 K/UL (ref 0–0.2)
BASOPHILS NFR BLD: 0 % (ref 0–2)
BILIRUB UR QL STRIP: NEGATIVE
CLARITY UR: CLEAR
COLOR UR: YELLOW
EOSINOPHIL # BLD: 0.03 K/UL (ref 0.05–0.5)
EOSINOPHILS RELATIVE PERCENT: 1 % (ref 0–6)
ERYTHROCYTE [DISTWIDTH] IN BLOOD BY AUTOMATED COUNT: 16.2 % (ref 11.5–15)
GLUCOSE UR STRIP-MCNC: NEGATIVE MG/DL
HCT VFR BLD AUTO: 41.4 % (ref 34–48)
HGB BLD-MCNC: 12.6 G/DL (ref 11.5–15.5)
HGB UR QL STRIP.AUTO: NEGATIVE
IMM GRANULOCYTES # BLD AUTO: <0.03 K/UL (ref 0–0.58)
IMM GRANULOCYTES NFR BLD: 0 % (ref 0–5)
KETONES UR STRIP-MCNC: NEGATIVE MG/DL
LEUKOCYTE ESTERASE UR QL STRIP: ABNORMAL
LYMPHOCYTES NFR BLD: 1.12 K/UL (ref 1.5–4)
LYMPHOCYTES RELATIVE PERCENT: 18 % (ref 20–42)
MCH RBC QN AUTO: 28.1 PG (ref 26–35)
MCHC RBC AUTO-ENTMCNC: 30.4 G/DL (ref 32–34.5)
MCV RBC AUTO: 92.2 FL (ref 80–99.9)
MONOCYTES NFR BLD: 0.6 K/UL (ref 0.1–0.95)
MONOCYTES NFR BLD: 10 % (ref 2–12)
NEUTROPHILS NFR BLD: 71 % (ref 43–80)
NEUTS SEG NFR BLD: 4.43 K/UL (ref 1.8–7.3)
NITRITE UR QL STRIP: NEGATIVE
PH UR STRIP: 7 [PH] (ref 5–9)
PLATELET # BLD AUTO: 225 K/UL (ref 130–450)
PMV BLD AUTO: 10 FL (ref 7–12)
PROT UR STRIP-MCNC: NEGATIVE MG/DL
RBC # BLD AUTO: 4.49 M/UL (ref 3.5–5.5)
RBC #/AREA URNS HPF: ABNORMAL /HPF
SP GR UR STRIP: <1.005 (ref 1–1.03)
UROBILINOGEN UR STRIP-ACNC: 0.2 EU/DL (ref 0–1)
WBC #/AREA URNS HPF: ABNORMAL /HPF
WBC OTHER # BLD: 6.2 K/UL (ref 4.5–11.5)

## 2024-04-04 PROCEDURE — 71045 X-RAY EXAM CHEST 1 VIEW: CPT

## 2024-04-04 PROCEDURE — 84484 ASSAY OF TROPONIN QUANT: CPT

## 2024-04-04 PROCEDURE — 85025 COMPLETE CBC W/AUTO DIFF WBC: CPT

## 2024-04-04 PROCEDURE — 83880 ASSAY OF NATRIURETIC PEPTIDE: CPT

## 2024-04-04 PROCEDURE — 83735 ASSAY OF MAGNESIUM: CPT

## 2024-04-04 PROCEDURE — 80048 BASIC METABOLIC PNL TOTAL CA: CPT

## 2024-04-04 PROCEDURE — 99285 EMERGENCY DEPT VISIT HI MDM: CPT

## 2024-04-04 PROCEDURE — 81001 URINALYSIS AUTO W/SCOPE: CPT

## 2024-04-04 PROCEDURE — 93005 ELECTROCARDIOGRAM TRACING: CPT

## 2024-04-04 RX ORDER — 0.9 % SODIUM CHLORIDE 0.9 %
500 INTRAVENOUS SOLUTION INTRAVENOUS ONCE
Status: DISCONTINUED | OUTPATIENT
Start: 2024-04-04 | End: 2024-04-08 | Stop reason: HOSPADM

## 2024-04-04 ASSESSMENT — PAIN - FUNCTIONAL ASSESSMENT: PAIN_FUNCTIONAL_ASSESSMENT: NONE - DENIES PAIN

## 2024-04-05 ENCOUNTER — APPOINTMENT (OUTPATIENT)
Dept: CT IMAGING | Age: 84
DRG: 189 | End: 2024-04-05
Payer: MEDICARE

## 2024-04-05 PROBLEM — J96.01 ACUTE HYPOXIC RESPIRATORY FAILURE (HCC): Status: ACTIVE | Noted: 2024-04-05

## 2024-04-05 LAB
ALBUMIN SERPL-MCNC: 4.1 G/DL (ref 3.5–5.2)
ALP SERPL-CCNC: 70 U/L (ref 35–104)
ALT SERPL-CCNC: 19 U/L (ref 0–32)
ANION GAP SERPL CALCULATED.3IONS-SCNC: 7 MMOL/L (ref 7–16)
ANION GAP SERPL CALCULATED.3IONS-SCNC: 8 MMOL/L (ref 7–16)
AST SERPL-CCNC: 24 U/L (ref 0–31)
BILIRUB SERPL-MCNC: 0.2 MG/DL (ref 0–1.2)
BNP SERPL-MCNC: 602 PG/ML (ref 0–450)
BUN SERPL-MCNC: 15 MG/DL (ref 6–23)
BUN SERPL-MCNC: 18 MG/DL (ref 6–23)
CALCIUM SERPL-MCNC: 10 MG/DL (ref 8.6–10.2)
CALCIUM SERPL-MCNC: 9.8 MG/DL (ref 8.6–10.2)
CHLORIDE SERPL-SCNC: 100 MMOL/L (ref 98–107)
CHLORIDE SERPL-SCNC: 103 MMOL/L (ref 98–107)
CO2 SERPL-SCNC: 28 MMOL/L (ref 22–29)
CO2 SERPL-SCNC: 30 MMOL/L (ref 22–29)
CREAT SERPL-MCNC: 0.9 MG/DL (ref 0.5–1)
CREAT SERPL-MCNC: 1 MG/DL (ref 0.5–1)
DIGOXIN SERPL-MCNC: 0.5 NG/ML (ref 0.8–2)
DIGOXIN SERPL-MCNC: 0.7 NG/ML (ref 0.8–2)
EKG ATRIAL RATE: 74 BPM
EKG P AXIS: 88 DEGREES
EKG P-R INTERVAL: 194 MS
EKG Q-T INTERVAL: 444 MS
EKG QRS DURATION: 108 MS
EKG QTC CALCULATION (BAZETT): 492 MS
EKG R AXIS: -44 DEGREES
EKG T AXIS: 33 DEGREES
EKG VENTRICULAR RATE: 74 BPM
GFR SERPL CREATININE-BSD FRML MDRD: 54 ML/MIN/1.73M2
GFR SERPL CREATININE-BSD FRML MDRD: 64 ML/MIN/1.73M2
GLUCOSE SERPL-MCNC: 101 MG/DL (ref 74–99)
GLUCOSE SERPL-MCNC: 88 MG/DL (ref 74–99)
INFLUENZA A BY PCR: NOT DETECTED
INFLUENZA B BY PCR: NOT DETECTED
MAGNESIUM SERPL-MCNC: 1.8 MG/DL (ref 1.6–2.6)
POTASSIUM SERPL-SCNC: 4.1 MMOL/L (ref 3.5–5)
POTASSIUM SERPL-SCNC: 4.5 MMOL/L (ref 3.5–5)
PROT SERPL-MCNC: 7 G/DL (ref 6.4–8.3)
SARS-COV-2 RDRP RESP QL NAA+PROBE: NOT DETECTED
SODIUM SERPL-SCNC: 138 MMOL/L (ref 132–146)
SODIUM SERPL-SCNC: 138 MMOL/L (ref 132–146)
SPECIMEN DESCRIPTION: NORMAL
TROPONIN I SERPL HS-MCNC: 38 NG/L (ref 0–9)
TROPONIN I SERPL HS-MCNC: 38 NG/L (ref 0–9)

## 2024-04-05 PROCEDURE — 84484 ASSAY OF TROPONIN QUANT: CPT

## 2024-04-05 PROCEDURE — 87502 INFLUENZA DNA AMP PROBE: CPT

## 2024-04-05 PROCEDURE — 93010 ELECTROCARDIOGRAM REPORT: CPT | Performed by: INTERNAL MEDICINE

## 2024-04-05 PROCEDURE — 74177 CT ABD & PELVIS W/CONTRAST: CPT

## 2024-04-05 PROCEDURE — 2700000000 HC OXYGEN THERAPY PER DAY

## 2024-04-05 PROCEDURE — 6370000000 HC RX 637 (ALT 250 FOR IP): Performed by: FAMILY MEDICINE

## 2024-04-05 PROCEDURE — 70450 CT HEAD/BRAIN W/O DYE: CPT

## 2024-04-05 PROCEDURE — 71275 CT ANGIOGRAPHY CHEST: CPT

## 2024-04-05 PROCEDURE — 80162 ASSAY OF DIGOXIN TOTAL: CPT

## 2024-04-05 PROCEDURE — 6370000000 HC RX 637 (ALT 250 FOR IP): Performed by: STUDENT IN AN ORGANIZED HEALTH CARE EDUCATION/TRAINING PROGRAM

## 2024-04-05 PROCEDURE — 87635 SARS-COV-2 COVID-19 AMP PRB: CPT

## 2024-04-05 PROCEDURE — 6360000004 HC RX CONTRAST MEDICATION: Performed by: RADIOLOGY

## 2024-04-05 PROCEDURE — 2060000000 HC ICU INTERMEDIATE R&B

## 2024-04-05 PROCEDURE — 80053 COMPREHEN METABOLIC PANEL: CPT

## 2024-04-05 RX ORDER — DIGOXIN 125 MCG
125 TABLET ORAL
Status: DISCONTINUED | OUTPATIENT
Start: 2024-04-05 | End: 2024-04-08 | Stop reason: HOSPADM

## 2024-04-05 RX ORDER — OLMESARTAN MEDOXOMIL 5 MG/1
5 TABLET ORAL DAILY
Status: ON HOLD | COMMUNITY
Start: 2024-03-20 | End: 2024-04-08 | Stop reason: HOSPADM

## 2024-04-05 RX ORDER — MECLIZINE HCL 12.5 MG/1
12.5 TABLET ORAL ONCE
Status: COMPLETED | OUTPATIENT
Start: 2024-04-05 | End: 2024-04-05

## 2024-04-05 RX ORDER — FERROUS SULFATE 325(65) MG
325 TABLET ORAL 2 TIMES DAILY WITH MEALS
Status: DISCONTINUED | OUTPATIENT
Start: 2024-04-05 | End: 2024-04-08 | Stop reason: HOSPADM

## 2024-04-05 RX ORDER — LOSARTAN POTASSIUM 25 MG/1
25 TABLET ORAL DAILY
Status: DISCONTINUED | OUTPATIENT
Start: 2024-04-05 | End: 2024-04-08 | Stop reason: HOSPADM

## 2024-04-05 RX ORDER — BUSPIRONE HYDROCHLORIDE 5 MG/1
5 TABLET ORAL 2 TIMES DAILY
Status: DISCONTINUED | OUTPATIENT
Start: 2024-04-05 | End: 2024-04-08 | Stop reason: HOSPADM

## 2024-04-05 RX ORDER — AMIODARONE HYDROCHLORIDE 200 MG/1
200 TABLET ORAL EVERY MORNING
Status: DISCONTINUED | OUTPATIENT
Start: 2024-04-06 | End: 2024-04-08 | Stop reason: HOSPADM

## 2024-04-05 RX ORDER — BUSPIRONE HYDROCHLORIDE 5 MG/1
5 TABLET ORAL 2 TIMES DAILY
Status: DISCONTINUED | OUTPATIENT
Start: 2024-04-05 | End: 2024-04-05 | Stop reason: SDUPTHER

## 2024-04-05 RX ADMIN — MECLIZINE 12.5 MG: 12.5 TABLET ORAL at 00:43

## 2024-04-05 RX ADMIN — APIXABAN 5 MG: 5 TABLET, FILM COATED ORAL at 20:23

## 2024-04-05 RX ADMIN — BUSPIRONE HYDROCHLORIDE 5 MG: 5 TABLET ORAL at 11:00

## 2024-04-05 RX ADMIN — DIGOXIN 125 MCG: 0.12 TABLET ORAL at 13:22

## 2024-04-05 RX ADMIN — FERROUS SULFATE TAB 325 MG (65 MG ELEMENTAL FE) 325 MG: 325 (65 FE) TAB at 16:05

## 2024-04-05 RX ADMIN — IOPAMIDOL 75 ML: 755 INJECTION, SOLUTION INTRAVENOUS at 05:45

## 2024-04-05 RX ADMIN — BUSPIRONE HYDROCHLORIDE 5 MG: 5 TABLET ORAL at 22:56

## 2024-04-05 RX ADMIN — LOSARTAN POTASSIUM 25 MG: 25 TABLET, FILM COATED ORAL at 16:05

## 2024-04-05 NOTE — ED PROVIDER NOTES
84-year-old female with history of chronic A-fib on Eliquis, Amio and digoxin, history of breast cancer status post left mastectomy she is not on any chemotherapy.  She also reportedly has history of COPD and is a chronic smoker.  She comes emergency department for the concerns of dizziness which she describes it as she feels like she is about to pass out when she walks, increased fatigue on exertion and shortness of breath.  Her symptoms started at noon today and has been gradually getting worse.  Particularly on exertion she denies the following: Fever, chills, chest pain, pleuritic chest pain, cough, recent URI-like symptoms, abdominal pain, nausea, vomiting, diarrhea, urinary symptoms, GI bleed.        Chief Complaint   Patient presents with    Dizziness     Dizziness, weakness, and shakiness. Started about noon today.        Review of Systems   Pert ros stated in the hpi   Physical Exam  Vitals reviewed.   Constitutional:       General: She is not in acute distress.     Appearance: She is not ill-appearing.   HENT:      Head: Normocephalic.      Right Ear: External ear normal.      Left Ear: External ear normal.      Nose: Nose normal.      Mouth/Throat:      Mouth: Mucous membranes are moist.   Eyes:      General:         Right eye: No discharge.         Left eye: No discharge.      Conjunctiva/sclera: Conjunctivae normal.   Cardiovascular:      Rate and Rhythm: Normal rate and regular rhythm.      Heart sounds:      No friction rub. No gallop.   Pulmonary:      Effort: No respiratory distress.      Breath sounds: No stridor.   Abdominal:      General: There is no distension.      Tenderness: There is no abdominal tenderness. There is no guarding or rebound.   Musculoskeletal:         General: No deformity or signs of injury.      Cervical back: Normal range of motion and neck supple. No rigidity or tenderness.   Skin:     Coloration: Skin is not jaundiced.   Neurological:      Mental Status: She is alert.

## 2024-04-05 NOTE — ED NOTES
Patient requested buspar tablet ordered for anxiety. Contacted Dr. Isidro England via perfect serve who gave OK for home med to be ordered.

## 2024-04-05 NOTE — H&P
chronic secondary to chronic obstructive pulmonary disease, initial acute respiratory failure with hypoxia upon presentation.    PLAN:  Admit to telemetry.  Cardiology input.  Further recommendations pending.          THERESA BARAHONA DO      D:  04/05/2024 12:26:55     T:  04/05/2024 16:28:43     FAR/STANLEY  Job #:  [id number]     Doc#:  [id number]

## 2024-04-06 PROBLEM — R79.89 ELEVATED BRAIN NATRIURETIC PEPTIDE (BNP) LEVEL: Status: ACTIVE | Noted: 2024-04-06

## 2024-04-06 PROBLEM — E86.0 DEHYDRATION: Status: ACTIVE | Noted: 2024-04-06

## 2024-04-06 PROBLEM — I48.91 ATRIAL FIBRILLATION (HCC): Status: ACTIVE | Noted: 2024-04-06

## 2024-04-06 PROBLEM — I95.9 HYPOTENSION: Status: ACTIVE | Noted: 2024-04-06

## 2024-04-06 PROCEDURE — 6370000000 HC RX 637 (ALT 250 FOR IP): Performed by: INTERNAL MEDICINE

## 2024-04-06 PROCEDURE — 6370000000 HC RX 637 (ALT 250 FOR IP): Performed by: FAMILY MEDICINE

## 2024-04-06 PROCEDURE — 99232 SBSQ HOSP IP/OBS MODERATE 35: CPT | Performed by: INTERNAL MEDICINE

## 2024-04-06 PROCEDURE — 2580000003 HC RX 258: Performed by: INTERNAL MEDICINE

## 2024-04-06 PROCEDURE — 2060000000 HC ICU INTERMEDIATE R&B

## 2024-04-06 RX ORDER — HYDROXYZINE HYDROCHLORIDE 10 MG/1
25 TABLET, FILM COATED ORAL ONCE
Status: DISCONTINUED | OUTPATIENT
Start: 2024-04-06 | End: 2024-04-06 | Stop reason: CLARIF

## 2024-04-06 RX ORDER — 0.9 % SODIUM CHLORIDE 0.9 %
250 INTRAVENOUS SOLUTION INTRAVENOUS ONCE
Status: COMPLETED | OUTPATIENT
Start: 2024-04-06 | End: 2024-04-06

## 2024-04-06 RX ORDER — HYDROXYZINE PAMOATE 25 MG/1
25 CAPSULE ORAL ONCE
Status: COMPLETED | OUTPATIENT
Start: 2024-04-06 | End: 2024-04-06

## 2024-04-06 RX ADMIN — SODIUM CHLORIDE 250 ML: 9 INJECTION, SOLUTION INTRAVENOUS at 09:33

## 2024-04-06 RX ADMIN — APIXABAN 5 MG: 5 TABLET, FILM COATED ORAL at 08:52

## 2024-04-06 RX ADMIN — APIXABAN 5 MG: 5 TABLET, FILM COATED ORAL at 19:58

## 2024-04-06 RX ADMIN — HYDROXYZINE PAMOATE 25 MG: 25 CAPSULE ORAL at 14:53

## 2024-04-06 RX ADMIN — AMIODARONE HYDROCHLORIDE 200 MG: 200 TABLET ORAL at 09:30

## 2024-04-06 RX ADMIN — FERROUS SULFATE TAB 325 MG (65 MG ELEMENTAL FE) 325 MG: 325 (65 FE) TAB at 08:52

## 2024-04-07 LAB
ANION GAP SERPL CALCULATED.3IONS-SCNC: 6 MMOL/L (ref 7–16)
BUN SERPL-MCNC: 16 MG/DL (ref 6–23)
CALCIUM SERPL-MCNC: 9.1 MG/DL (ref 8.6–10.2)
CHLORIDE SERPL-SCNC: 102 MMOL/L (ref 98–107)
CO2 SERPL-SCNC: 29 MMOL/L (ref 22–29)
CREAT SERPL-MCNC: 1.1 MG/DL (ref 0.5–1)
ERYTHROCYTE [DISTWIDTH] IN BLOOD BY AUTOMATED COUNT: 15.9 % (ref 11.5–15)
GFR SERPL CREATININE-BSD FRML MDRD: 51 ML/MIN/1.73M2
GLUCOSE SERPL-MCNC: 84 MG/DL (ref 74–99)
HCT VFR BLD AUTO: 37.6 % (ref 34–48)
HGB BLD-MCNC: 11.4 G/DL (ref 11.5–15.5)
MCH RBC QN AUTO: 28.4 PG (ref 26–35)
MCHC RBC AUTO-ENTMCNC: 30.3 G/DL (ref 32–34.5)
MCV RBC AUTO: 93.8 FL (ref 80–99.9)
PLATELET # BLD AUTO: 193 K/UL (ref 130–450)
PMV BLD AUTO: 10.1 FL (ref 7–12)
POTASSIUM SERPL-SCNC: 4.1 MMOL/L (ref 3.5–5)
RBC # BLD AUTO: 4.01 M/UL (ref 3.5–5.5)
SODIUM SERPL-SCNC: 137 MMOL/L (ref 132–146)
WBC OTHER # BLD: 5.1 K/UL (ref 4.5–11.5)

## 2024-04-07 PROCEDURE — 6370000000 HC RX 637 (ALT 250 FOR IP): Performed by: INTERNAL MEDICINE

## 2024-04-07 PROCEDURE — 6370000000 HC RX 637 (ALT 250 FOR IP): Performed by: FAMILY MEDICINE

## 2024-04-07 PROCEDURE — 99232 SBSQ HOSP IP/OBS MODERATE 35: CPT | Performed by: INTERNAL MEDICINE

## 2024-04-07 PROCEDURE — 2060000000 HC ICU INTERMEDIATE R&B

## 2024-04-07 PROCEDURE — 85027 COMPLETE CBC AUTOMATED: CPT

## 2024-04-07 PROCEDURE — 6370000000 HC RX 637 (ALT 250 FOR IP)

## 2024-04-07 PROCEDURE — 80048 BASIC METABOLIC PNL TOTAL CA: CPT

## 2024-04-07 RX ORDER — ACETAMINOPHEN 325 MG/1
650 TABLET ORAL EVERY 4 HOURS PRN
Status: DISCONTINUED | OUTPATIENT
Start: 2024-04-07 | End: 2024-04-08 | Stop reason: HOSPADM

## 2024-04-07 RX ORDER — HYDROXYZINE PAMOATE 25 MG/1
25 CAPSULE ORAL ONCE
Status: COMPLETED | OUTPATIENT
Start: 2024-04-07 | End: 2024-04-07

## 2024-04-07 RX ADMIN — HYDROXYZINE PAMOATE 25 MG: 25 CAPSULE ORAL at 13:54

## 2024-04-07 RX ADMIN — AMIODARONE HYDROCHLORIDE 200 MG: 200 TABLET ORAL at 08:14

## 2024-04-07 RX ADMIN — APIXABAN 5 MG: 5 TABLET, FILM COATED ORAL at 21:21

## 2024-04-07 RX ADMIN — ACETAMINOPHEN 650 MG: 325 TABLET ORAL at 21:21

## 2024-04-07 RX ADMIN — APIXABAN 5 MG: 5 TABLET, FILM COATED ORAL at 08:14

## 2024-04-07 ASSESSMENT — PAIN SCALES - GENERAL: PAINLEVEL_OUTOF10: 0

## 2024-04-08 VITALS
SYSTOLIC BLOOD PRESSURE: 124 MMHG | BODY MASS INDEX: 26.94 KG/M2 | TEMPERATURE: 98.2 F | RESPIRATION RATE: 18 BRPM | WEIGHT: 181.88 LBS | HEIGHT: 69 IN | HEART RATE: 62 BPM | DIASTOLIC BLOOD PRESSURE: 80 MMHG | OXYGEN SATURATION: 94 %

## 2024-04-08 PROCEDURE — 97165 OT EVAL LOW COMPLEX 30 MIN: CPT

## 2024-04-08 PROCEDURE — 97161 PT EVAL LOW COMPLEX 20 MIN: CPT

## 2024-04-08 PROCEDURE — 6370000000 HC RX 637 (ALT 250 FOR IP)

## 2024-04-08 PROCEDURE — 6370000000 HC RX 637 (ALT 250 FOR IP): Performed by: INTERNAL MEDICINE

## 2024-04-08 PROCEDURE — 99239 HOSP IP/OBS DSCHRG MGMT >30: CPT | Performed by: INTERNAL MEDICINE

## 2024-04-08 PROCEDURE — 6370000000 HC RX 637 (ALT 250 FOR IP): Performed by: FAMILY MEDICINE

## 2024-04-08 RX ORDER — HYDROXYZINE HYDROCHLORIDE 25 MG/1
25 TABLET, FILM COATED ORAL EVERY 8 HOURS PRN
Qty: 30 TABLET | Refills: 0 | Status: SHIPPED | OUTPATIENT
Start: 2024-04-08 | End: 2024-04-18

## 2024-04-08 RX ORDER — HYDROXYZINE PAMOATE 25 MG/1
25 CAPSULE ORAL ONCE
Status: COMPLETED | OUTPATIENT
Start: 2024-04-08 | End: 2024-04-08

## 2024-04-08 RX ORDER — DIGOXIN 125 MCG
125 TABLET ORAL
Qty: 30 TABLET | Refills: 3 | Status: SHIPPED | OUTPATIENT
Start: 2024-04-11

## 2024-04-08 RX ORDER — SERTRALINE HYDROCHLORIDE 25 MG/1
25 TABLET, FILM COATED ORAL DAILY
Qty: 30 TABLET | Refills: 3 | Status: SHIPPED | OUTPATIENT
Start: 2024-04-09

## 2024-04-08 RX ORDER — HYDROXYZINE HYDROCHLORIDE 10 MG/1
25 TABLET, FILM COATED ORAL ONCE
Status: COMPLETED | OUTPATIENT
Start: 2024-04-08 | End: 2024-04-08

## 2024-04-08 RX ADMIN — AMIODARONE HYDROCHLORIDE 200 MG: 200 TABLET ORAL at 07:43

## 2024-04-08 RX ADMIN — HYDROXYZINE PAMOATE 25 MG: 25 CAPSULE ORAL at 00:40

## 2024-04-08 RX ADMIN — HYDROXYZINE HYDROCHLORIDE 25 MG: 10 TABLET ORAL at 11:03

## 2024-04-08 RX ADMIN — DIGOXIN 125 MCG: 0.12 TABLET ORAL at 11:03

## 2024-04-08 RX ADMIN — APIXABAN 5 MG: 5 TABLET, FILM COATED ORAL at 07:43

## 2024-04-08 NOTE — ACP (ADVANCE CARE PLANNING)
Advance Care Planning   Healthcare Decision Maker:    Primary Decision Maker: eliezer flores - Child - [phone]    Primary Decision Maker: marilyn martin - Child - [phone]    Supplemental (Other) Decision Maker: KELSEY BAEZ - Grandchild - [phone]    Click here to complete Healthcare Decision Makers including selection of the Healthcare Decision Maker Relationship (ie \"Primary\").  Today we documented Decision Maker(s) consistent with Legal Next of Kin hierarchy.    Patient states son, Eliezer, is her eldest child, but she does not wish for him to make decisions. Is deferring decision making to her daughter, Marilyn. Healthcare POA/Living Will forms provided for patient to have completed and notarized.

## 2024-04-08 NOTE — CARE COORDINATION
CASE MANAGEMENT.....Patient was recently in with afib/cp (had neg stress test) and is now admitted with dizziness. Cardio saw and med adjustments made. Orthos neg today and she is stable for dc per pcp. Met with Ms Sierra at the bedside. She voices living alone in a 1 floor plan. Uses cane/walker. Uses a food and laundry service. Has history with [facility]. PT 18/OT 18. Will not qualify for [facility] and is not interested in [facility]. Will call  Dr VALENTE England office if she changes her mind. Uses transportation services through her insurance for dr lora. Son and daughter live out of town, but granddaughter, Lian, is local and will provide transport home.

## 2024-04-08 NOTE — DISCHARGE SUMMARY
Patient Instructions:      Medication List        START taking these medications      hydrOXYzine HCl 25 MG tablet  Commonly known as: ATARAX  Take 1 tablet by mouth every 8 hours as needed for Itching            CHANGE how you take these medications      digoxin 125 MCG tablet  Commonly known as: LANOXIN  Take 1 tablet by mouth every 3 days  Start taking on: April 11, 2024  What changed:   how much to take  when to take this  additional instructions     sertraline 25 MG tablet  Commonly known as: ZOLOFT  Take 1 tablet by mouth daily  Start taking on: April 9, 2024  What changed:   medication strength  See the new instructions.            CONTINUE taking these medications      acetaminophen 500 MG tablet  Commonly known as: TYLENOL     amiodarone 200 MG tablet  Commonly known as: CORDARONE     apixaban 5 MG Tabs tablet  Commonly known as: ELIQUIS  Take 1 tablet by mouth 2 times daily     ferrous sulfate 325 (65 Fe) MG tablet  Commonly known as: IRON 325     mupirocin 2 % ointment  Commonly known as: BACTROBAN            STOP taking these medications      busPIRone 10 MG tablet  Commonly known as: BUSPAR     lisinopril 5 MG tablet  Commonly known as: PRINIVIL;ZESTRIL     olmesartan 5 MG tablet  Commonly known as: BENICAR               Where to Get Your Medications        These medications were sent to [address] -  [phone] - F [phone]  [address] [place], [place] [address]      Phone: [phone]   digoxin 125 MCG tablet  hydrOXYzine HCl 25 MG tablet  sertraline 25 MG tablet           Total time for discharge is 37 min    Signed:  Electronically signed by Sundar Georges DO on 4/8/2024 at 7:07 PM

## 2024-04-09 ENCOUNTER — CARE COORDINATION (OUTPATIENT)
Dept: CARE COORDINATION | Age: 84
End: 2024-04-09

## 2024-04-09 NOTE — CARE COORDINATION
Care Transitions Initial Follow Up Call    Call within 2 business days of discharge: Yes    Patient: Agueda Sierra Patient : 1940   MRN: [id number]  Reason for Admission: Acute Hypoxic Respiratory Failure  Discharge Date: 24 RARS: Readmission Risk Score: 15.9      Last Discharge Facility       Date Complaint Diagnosis Description Type Department Provider    24 Dizziness Near syncope ... ED to Hosp-Admission (Discharged) (ADMITTED) Isidro Huertas DO; [place]...          Was this an external facility discharge? No Discharge Facility: [facility]    Attempted to contact patient today 24 for initial IZA/hospital discharge (non-[facility]) follow up. Left message shawn home/[place] number requesting a return call back to [facility] and provided contact information.     NATALY Apple

## 2024-04-09 NOTE — PROGRESS NOTES
[facility] Hospitalist   Progress Note    Admitting Date and Time: 4/4/2024  9:30 PM  Admit Dx: Near syncope [R55]  Acute respiratory failure with hypoxia (HCC) [J96.01]  Dyspnea, unspecified type [R06.00]  Acute hypoxic respiratory failure (HCC) [J96.01]    Subjective:    Patient was admitted with Near syncope [R55]  Acute respiratory failure with hypoxia (HCC) [J96.01]  Dyspnea, unspecified type [R06.00]  Acute hypoxic respiratory failure (HCC) [J96.01]. Patient denies fever, chills, cp, sob, n/v.     amiodarone  200 mg Oral QAM    apixaban  5 mg Oral BID    [Held by provider] busPIRone  5 mg Oral BID    [Held by provider] losartan  25 mg Oral Daily    sertraline  25 mg Oral Daily    digoxin  125 mcg Oral Q3 Days    ferrous sulfate  325 mg Oral BID WC    sodium chloride  500 mL IntraVENous Once           Objective:    /72   Pulse 69   Temp 98 °F (36.7 °C) (Oral)   Resp 16   Ht 1.753 m (5' 9\")   Wt 82.5 kg (181 lb 14.1 oz)   SpO2 95%   BMI 26.86 kg/m²   Skin: warm and dry, no rash or erythema  Pulmonary/Chest: clear to auscultation bilaterally- no wheezes, rales or rhonchi, normal air movement, no respiratory distress  Cardiovascular: rhythm reg at rate of 72  Abdomen: soft, non-tender, non-distended, normal bowel sounds, no masses or organomegaly  Extremities: no cyanosis, no clubbing, and no edema      Recent Labs     04/04/24  2321 04/05/24  1308 04/07/24  0342    138 137   K 4.1 4.5 4.1    103 102   CO2 30* 28 29   BUN 18 15 16   CREATININE 1.0 0.9 1.1*   GLUCOSE 101* 88 84   CALCIUM 10.0 9.8 9.1       Recent Labs     04/04/24 2321 04/07/24  0342   WBC 6.2 5.1   RBC 4.49 4.01   HGB 12.6 11.4*   HCT 41.4 37.6   MCV 92.2 93.8   MCH 28.1 28.4   MCHC 30.4* 30.3*   RDW 16.2* 15.9*    193   MPV 10.0 10.1       CBC:   Lab Results   Component Value Date/Time    WBC 5.1 04/07/2024 03:42 AM    RBC 4.01 04/07/2024 03:42 AM    HGB 11.4 04/07/2024 03:42 AM    HCT 37.6 
     [facility] Hospitalist   Progress Note    Admitting Date and Time: 4/4/2024  9:30 PM  Admit Dx: Near syncope [R55]  Acute respiratory failure with hypoxia (HCC) [J96.01]  Dyspnea, unspecified type [R06.00]  Acute hypoxic respiratory failure (HCC) [J96.01]    Subjective:    Patient was admitted with Near syncope [R55]  Acute respiratory failure with hypoxia (HCC) [J96.01]  Dyspnea, unspecified type [R06.00]  Acute hypoxic respiratory failure (HCC) [J96.01]. Patient denies fever, chills, cp, sob, n/v.     sodium chloride  250 mL IntraVENous Once    amiodarone  200 mg Oral QAM    apixaban  5 mg Oral BID    [Held by provider] busPIRone  5 mg Oral BID    [Held by provider] losartan  25 mg Oral Daily    sertraline  25 mg Oral Daily    digoxin  125 mcg Oral Q3 Days    ferrous sulfate  325 mg Oral BID WC    sodium chloride  500 mL IntraVENous Once           Objective:    BP (!) 78/42   Pulse 70   Temp 97.6 °F (36.4 °C) (Oral)   Resp 18   Ht 1.753 m (5' 9\")   Wt 82.5 kg (181 lb 14.1 oz)   SpO2 93%   BMI 26.86 kg/m²   Skin: warm and dry, no rash or erythema  Pulmonary/Chest: clear to auscultation bilaterally- no wheezes, rales or rhonchi, normal air movement, no respiratory distress  Cardiovascular: rhythm reg at rate of 72  Abdomen: soft, non-tender, non-distended, normal bowel sounds, no masses or organomegaly  Extremities: no cyanosis, no clubbing, and no edema      Recent Labs     04/04/24  2321 04/05/24  1308    138   K 4.1 4.5    103   CO2 30* 28   BUN 18 15   CREATININE 1.0 0.9   GLUCOSE 101* 88   CALCIUM 10.0 9.8       Recent Labs     04/04/24 2321   WBC 6.2   RBC 4.49   HGB 12.6   HCT 41.4   MCV 92.2   MCH 28.1   MCHC 30.4*   RDW 16.2*      MPV 10.0            Radiology:   CTA PULMONARY W CONTRAST   Final Result   1. No evidence of pulmonary embolism or acute pulmonary abnormality.   2. Hiatal hernia with distal esophageal wall thickening suggesting   esophagitis.       
24 hour chart check complete    
4 Eyes Skin Assessment     NAME:  Agueda Sierra  YOB: 1940  MEDICAL RECORD NUMBER:  [id number]    The patient is being assessed for  Admission    I agree that at least one RN has performed a thorough Head to Toe Skin Assessment on the patient. ALL assessment sites listed below have been assessed.      Areas assessed by both nurses:    Head, Face, Ears, Shoulders, Back, Chest, Arms, Elbows, Hands, Sacrum. Buttock, Coccyx, Ischium, Legs. Feet and Heels, and Under Medical Devices         Does the Patient have a Wound? No noted wound(s)       Nba Prevention initiated by RN: No  Wound Care Orders initiated by RN: No    Pressure Injury (Stage 3,4, Unstageable, DTI, NWPT, and Complex wounds) if present, place Wound referral order by RN under : No    New Ostomies, if present place, Ostomy referral order under : No     Nurse 1 eSignature: Electronically signed by Isabel Nelson RN on 4/5/24 at 3:49 PM EDT    **SHARE this note so that the co-signing nurse can place an eSignature**    Nurse 2 eSignature: {Esignature:[id number]}   
Ambulated pt to bathroom, and returned back to bed, pt given breakfast tray at this time     
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    Disability of walking R26.2    Elevated lactic acid level R79.89    Persistent atrial fibrillation (HCC) I48.19    Chronic anemia D64.9    Diverticulosis of colon K57.30    Permanent atrial fibrillation (HCC) I48.21    Acute blood loss anemia D62    Hypoxia R09.02    Paroxysmal atrial fibrillation (HCC) I48.0    Chest pain R07.9    Anxiety F41.9    Tobacco abuse Z72.0    Acute hypoxic respiratory failure (HCC) J96.01    Hypotension I95.9    Dehydration E86.0    Elevated brain natriuretic peptide (BNP) level R79.89    Atrial fibrillation ([facility]) I48.91       SUBJECTIVE:  Agueda Sierra presented to the emergency room with near syncope and generalized weakness.  She relates to having her blood pressure medications changed in the recent past due to the fact that she had side effects of recurrent coughing from her previous medications.  She has been in the emergency room apparently since yesterday afternoon and since admission to the floor she notes that she feels somewhat better but has not ambulated much except to the bathroom.  She denies any shortness of breath nor chest heaviness and has tolerated discontinuance of her blood pressure medicine.    REVIEW OF SYSTEMS:  General ROS: negative for - fatigue, malaise,  weight gain or weight loss  Psychological ROS: negative for - anxiety , depression  Ophthalmic ROS: negative for - decreased vision or visual distortion.  ENT ROS: negative  Allergy and Immunology ROS: negative  Hematological and Lymphatic ROS: negative  Endocrine: 
CLINICAL PHARMACY NOTE: MEDS TO BEDS    Total # of Prescriptions Filled: 3   The following medications were delivered to the patient:  Digoin 125 mcg  Hyrdoxyzine 25 mg  Sertraline 25 mg    Additional Documentation:  
Database initiated pharmacy and medications verified with the patient. She is A&O comes in from home alone. She uses a cane and is RA at baseline. She just started Olmesartan and is wondering is that is what's \"making her ill\"   
Dr. Georges notified of pts request for something for anxiety. Orders received.   
Dr. Winters paged regarding low BP.   
Dr. Winters paged regarding new consult.   
ED to Inpatient Handoff Report    Notified Adam that electronic handoff available and patient ready for transport to room 442.    Safety Risks: Risk of falls    Patient in Restraints: no    Constant Observer or Patient : no    Telemetry Monitoring Ordered: Yes          Order to transfer to unit without monitor: YES    Last MEWS: . Time completed: .    Deterioration Index: 23    Vitals:    04/04/24 2132 04/05/24 0031 04/05/24 1315   BP: (!) 140/111  128/61   Pulse: 69  61   Resp: 16  18   Temp: 98.1 °F (36.7 °C)     TempSrc: Oral     SpO2: 94% 90% 92%   Weight: 82.6 kg (182 lb)     Height: 1.753 m (5' 9\")         Opportunity for questions and clarification was provided.    
OCCUPATIONAL THERAPY INITIAL EVALUATION    [facility]   [address] [facility]        Date:2024                                                  Patient Name: Agueda Sierra    MRN: [id number]    : 1940    Room: [address] [place] [address]    Evaluating OT: Dee Duckworth OTR/L #[id number]    Referring Provider:  Sundar Georges DO     Specific Provider Orders/Date:  OT Eval and Treat , 2024     Diagnosis:   1. Near syncope    2. Dyspnea, unspecified type    3. Acute respiratory failure with hypoxia (HCC)         Surgery: None       Pertinent Medical History: Breast CA s/p mastectomy, A-fib      Precautions:  Fall Risk, alarm      Assessment of current deficits    [x] Functional mobility  [x]ADLs  [x] Strength               []Cognition    [x] Functional transfers   [x] IADLs         [] Safety Awareness   []Endurance    [] Fine Coordination              [x] Balance      [] Vision/perception   []Sensation     []Gross Motor Coordination  [] ROM  [] Delirium                   [] Motor Control     OT PLAN OF CARE   OT POC based on physician orders, patient diagnosis and results of clinical assessment    Frequency/Duration 2-5 days/wk for 2-4 weeks PRN     Specific OT Treatment Interventions to include:   * Instruction/training on adapted ADL techniques and AE recommendations to increase functional independence within precautions       * Training on energy conservation strategies, correct breathing pattern and techniques to improve independence/tolerance for self-care routine  * Functional transfer/mobility training/DME recommendations for increased independence, safety, and fall prevention  * Patient/Family education to increase follow through with safety techniques and functional independence  * Recommendation of environmental modifications for increased safety with functional transfers/mobility and ADLs  * Therapeutic exercise to improve motor endurance, 
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    Disability of walking R26.2    Elevated lactic acid level R79.89    Persistent atrial fibrillation (HCC) I48.19    Chronic anemia D64.9    Diverticulosis of colon K57.30    Permanent atrial fibrillation (HCC) I48.21    Acute blood loss anemia D62    Hypoxia R09.02    Paroxysmal atrial fibrillation (HCC) I48.0    Chest pain R07.9    Anxiety F41.9    Tobacco abuse Z72.0    Acute hypoxic respiratory failure (HCC) J96.01    Hypotension I95.9    Dehydration E86.0    Elevated brain natriuretic peptide (BNP) level R79.89    Atrial fibrillation ([facility]) I48.91       SUBJECTIVE:  Agueda Sierra states she feels somewhat better but still mildly lightheaded on her feet and has difficulty in ambulating to the bathroom and back as her lower extremities are quite weak and also notes great concern about discharge home as she states that she has no one at home to care for her.    REVIEW OF SYSTEMS:  General ROS: negative for - fatigue, malaise,  weight gain or weight loss  Psychological ROS: negative for - anxiety , depression  Ophthalmic ROS: negative for - decreased vision or visual distortion.  ENT ROS: negative  Allergy and Immunology ROS: negative  Hematological and Lymphatic ROS: negative  Endocrine: no heat or cold intolerance and no polyphagia, polydipsia, or polyuria  Respiratory ROS: positive for - shortness of breath  Cardiovascular ROS: positive for - irregular heartbeat, shortness of breath, and recurrent lightheadedness.  Gastrointestinal ROS: no abdominal pain, change in bowel habits, 
Pt admitted with near Syncope Extreme Fatigue Hx of a-fib. Acute Respiratory Failure with Hypoxia ( Pt seen and evaluated in ED)      H&P dictated  
SPIRITUAL HEALTH SERVICES - [facility] Encounter    Name: Agueda Sierra                  Referral: Routine Visit    Sacraments  Anointed (Last Rites): Yes  Apostolic [place]: No  Confession: No  Communion: Yes     Assessment:  Patient receptive to  visit.      Intervention:   provided spiritual support and sacramental ministry for patient.     Outcome:  Patient expressed gratitude for visit.    Plan:  Chaplains will remain available to offer spiritual and emotional support as needed.      Electronically signed by Chaplain Juan Miguel, on 4/8/2024 at 1:26 PM.  Spiritual Care Department  [facility]  [phone]   
Spoke to Loreto Menezes NP about patients code status.   
Spoke with Dr. Winters, aware of consult.   
HEP  [] Other     PT long term treatment goals are located in above grid    Frequency of treatments: 2-5x/week x 3-5 days.    Time in  0926  Time out  0937      Evaluation Time includes thorough review of current medical information, gathering information on past medical history/social history and prior level of function, completion of standardized testing/informal observation of tasks, assessment of data and education on plan of care and goals.      CPT codes:  [x] Low Complexity PT evaluation 31220  [] Moderate Complexity PT evaluation 32446  [] High Complexity PT evaluation 14391  [] PT Re-evaluation 80081  [] Gait training 84764 minutes  [] Manual therapy 06273 minutes  [] Therapeutic activities 82969 minutes  [] Therapeutic exercises 79921 minutes  [] Neuromuscular reeducation 95618 minutes     Maren Cruz PT [id number]

## 2024-04-10 ENCOUNTER — CARE COORDINATION (OUTPATIENT)
Dept: CARE COORDINATION | Age: 84
End: 2024-04-10

## 2024-04-10 NOTE — CARE COORDINATION
Care Transitions Initial Follow Up Call    Call within 2 business days of discharge: Yes    -Second attempt to reach the patient for Non [facility] PCP Care Transition call post hospital discharge. HIPAA compliant message left with CTN's contact information requesting return phone call.        Patient: Agueda Sierra Patient : 1940   MRN: [id number]  Reason for Admission: Near syncope   Discharge Date: 24 RARS: Readmission Risk Score: 15.9      Last Discharge Facility       Date Complaint Diagnosis Description Type Department Provider    24 Dizziness Near syncope ... ED to Hosp-Admission (Discharged) (ADMITTED) Isidro Huertas, DO; [place] [facility]...                Follow Up  No future appointments.      Hanane Osman RN

## 2024-05-06 PROBLEM — E86.0 DEHYDRATION: Status: RESOLVED | Noted: 2024-04-06 | Resolved: 2024-05-06

## 2024-05-29 ENCOUNTER — HOSPITAL ENCOUNTER (OUTPATIENT)
Age: 84
Setting detail: OBSERVATION
Discharge: NURSING, REHAB OR LONG-TERM CARE | End: 2024-05-30
Attending: EMERGENCY MEDICINE | Admitting: INTERNAL MEDICINE
Payer: MEDICARE

## 2024-05-29 ENCOUNTER — APPOINTMENT (OUTPATIENT)
Dept: GENERAL RADIOLOGY | Age: 84
End: 2024-05-29
Payer: MEDICARE

## 2024-05-29 DIAGNOSIS — R07.9 CHEST PAIN, UNSPECIFIED TYPE: ICD-10-CM

## 2024-05-29 DIAGNOSIS — R53.1 GENERALIZED WEAKNESS: Primary | ICD-10-CM

## 2024-05-29 LAB
ALBUMIN SERPL-MCNC: 3.6 G/DL (ref 3.5–5.2)
ALP SERPL-CCNC: 74 U/L (ref 35–104)
ALT SERPL-CCNC: 15 U/L (ref 0–32)
ANION GAP SERPL CALCULATED.3IONS-SCNC: 11 MMOL/L (ref 7–16)
AST SERPL-CCNC: 22 U/L (ref 0–31)
BACTERIA URNS QL MICRO: ABNORMAL
BASOPHILS # BLD: 0.02 K/UL (ref 0–0.2)
BASOPHILS NFR BLD: 0 % (ref 0–2)
BILIRUB SERPL-MCNC: 0.2 MG/DL (ref 0–1.2)
BILIRUB UR QL STRIP: NEGATIVE
BNP SERPL-MCNC: 239 PG/ML (ref 0–450)
BUN SERPL-MCNC: 14 MG/DL (ref 6–23)
CALCIUM SERPL-MCNC: 8.2 MG/DL (ref 8.6–10.2)
CHLORIDE SERPL-SCNC: 106 MMOL/L (ref 98–107)
CLARITY UR: CLEAR
CO2 SERPL-SCNC: 23 MMOL/L (ref 22–29)
COLOR UR: YELLOW
CREAT SERPL-MCNC: 0.8 MG/DL (ref 0.5–1)
DATE LAST DOSE: ABNORMAL
DIGOXIN DOSE TIME: ABNORMAL
DIGOXIN DOSE: ABNORMAL MG
DIGOXIN SERPL-MCNC: 0.5 NG/ML (ref 0.8–2)
EKG ATRIAL RATE: 64 BPM
EKG P AXIS: 82 DEGREES
EKG P-R INTERVAL: 202 MS
EKG Q-T INTERVAL: 428 MS
EKG QRS DURATION: 100 MS
EKG QTC CALCULATION (BAZETT): 441 MS
EKG R AXIS: -174 DEGREES
EKG T AXIS: 148 DEGREES
EKG VENTRICULAR RATE: 64 BPM
EOSINOPHIL # BLD: 0.08 K/UL (ref 0.05–0.5)
EOSINOPHILS RELATIVE PERCENT: 1 % (ref 0–6)
EPI CELLS #/AREA URNS HPF: ABNORMAL /HPF
ERYTHROCYTE [DISTWIDTH] IN BLOOD BY AUTOMATED COUNT: 16 % (ref 11.5–15)
GFR, ESTIMATED: 71 ML/MIN/1.73M2
GLUCOSE SERPL-MCNC: 86 MG/DL (ref 74–99)
GLUCOSE UR STRIP-MCNC: NEGATIVE MG/DL
HCT VFR BLD AUTO: 37.8 % (ref 34–48)
HGB BLD-MCNC: 11.5 G/DL (ref 11.5–15.5)
HGB UR QL STRIP.AUTO: NEGATIVE
IMM GRANULOCYTES # BLD AUTO: <0.03 K/UL (ref 0–0.58)
IMM GRANULOCYTES NFR BLD: 0 % (ref 0–5)
KETONES UR STRIP-MCNC: NEGATIVE MG/DL
LEUKOCYTE ESTERASE UR QL STRIP: ABNORMAL
LYMPHOCYTES NFR BLD: 1.28 K/UL (ref 1.5–4)
LYMPHOCYTES RELATIVE PERCENT: 17 % (ref 20–42)
MCH RBC QN AUTO: 27.8 PG (ref 26–35)
MCHC RBC AUTO-ENTMCNC: 30.4 G/DL (ref 32–34.5)
MCV RBC AUTO: 91.5 FL (ref 80–99.9)
MONOCYTES NFR BLD: 0.69 K/UL (ref 0.1–0.95)
MONOCYTES NFR BLD: 9 % (ref 2–12)
NEUTROPHILS NFR BLD: 72 % (ref 43–80)
NEUTS SEG NFR BLD: 5.47 K/UL (ref 1.8–7.3)
NITRITE UR QL STRIP: NEGATIVE
PH UR STRIP: 6 [PH] (ref 5–9)
PLATELET # BLD AUTO: 233 K/UL (ref 130–450)
PMV BLD AUTO: 9.8 FL (ref 7–12)
POTASSIUM SERPL-SCNC: 4 MMOL/L (ref 3.5–5)
PROT SERPL-MCNC: 6.4 G/DL (ref 6.4–8.3)
PROT UR STRIP-MCNC: NEGATIVE MG/DL
RBC # BLD AUTO: 4.13 M/UL (ref 3.5–5.5)
RBC #/AREA URNS HPF: ABNORMAL /HPF
SODIUM SERPL-SCNC: 140 MMOL/L (ref 132–146)
SP GR UR STRIP: 1.01 (ref 1–1.03)
TROPONIN I SERPL HS-MCNC: 33 NG/L (ref 0–9)
TROPONIN I SERPL HS-MCNC: 52 NG/L (ref 0–9)
TROPONIN I SERPL HS-MCNC: NORMAL NG/L (ref 0–14)
TROPONIN INTERP: NORMAL
TROPONIN T SERPL-MCNC: NORMAL NG/ML
UROBILINOGEN UR STRIP-ACNC: 0.2 EU/DL (ref 0–1)
WBC #/AREA URNS HPF: ABNORMAL /HPF
WBC OTHER # BLD: 7.6 K/UL (ref 4.5–11.5)

## 2024-05-29 PROCEDURE — 97535 SELF CARE MNGMENT TRAINING: CPT

## 2024-05-29 PROCEDURE — 99222 1ST HOSP IP/OBS MODERATE 55: CPT | Performed by: HOSPITALIST

## 2024-05-29 PROCEDURE — 81001 URINALYSIS AUTO W/SCOPE: CPT

## 2024-05-29 PROCEDURE — 80162 ASSAY OF DIGOXIN TOTAL: CPT

## 2024-05-29 PROCEDURE — 80053 COMPREHEN METABOLIC PANEL: CPT

## 2024-05-29 PROCEDURE — 83880 ASSAY OF NATRIURETIC PEPTIDE: CPT

## 2024-05-29 PROCEDURE — 97165 OT EVAL LOW COMPLEX 30 MIN: CPT

## 2024-05-29 PROCEDURE — 6370000000 HC RX 637 (ALT 250 FOR IP): Performed by: INTERNAL MEDICINE

## 2024-05-29 PROCEDURE — 99285 EMERGENCY DEPT VISIT HI MDM: CPT

## 2024-05-29 PROCEDURE — 84484 ASSAY OF TROPONIN QUANT: CPT

## 2024-05-29 PROCEDURE — 93005 ELECTROCARDIOGRAM TRACING: CPT | Performed by: EMERGENCY MEDICINE

## 2024-05-29 PROCEDURE — 85025 COMPLETE CBC W/AUTO DIFF WBC: CPT

## 2024-05-29 PROCEDURE — 6370000000 HC RX 637 (ALT 250 FOR IP): Performed by: EMERGENCY MEDICINE

## 2024-05-29 PROCEDURE — 71045 X-RAY EXAM CHEST 1 VIEW: CPT

## 2024-05-29 PROCEDURE — 2580000003 HC RX 258: Performed by: INTERNAL MEDICINE

## 2024-05-29 PROCEDURE — G0378 HOSPITAL OBSERVATION PER HR: HCPCS

## 2024-05-29 PROCEDURE — 93010 ELECTROCARDIOGRAM REPORT: CPT | Performed by: INTERNAL MEDICINE

## 2024-05-29 RX ORDER — SENNOSIDES 8.6 MG
650 CAPSULE ORAL DAILY
COMMUNITY

## 2024-05-29 RX ORDER — ONDANSETRON 2 MG/ML
4 INJECTION INTRAMUSCULAR; INTRAVENOUS EVERY 6 HOURS PRN
Status: DISCONTINUED | OUTPATIENT
Start: 2024-05-29 | End: 2024-05-30 | Stop reason: HOSPADM

## 2024-05-29 RX ORDER — ONDANSETRON 4 MG/1
4 TABLET, ORALLY DISINTEGRATING ORAL EVERY 8 HOURS PRN
Status: DISCONTINUED | OUTPATIENT
Start: 2024-05-29 | End: 2024-05-30 | Stop reason: HOSPADM

## 2024-05-29 RX ORDER — HYDROXYZINE HYDROCHLORIDE 10 MG/1
10 TABLET, FILM COATED ORAL 3 TIMES DAILY PRN
Status: DISCONTINUED | OUTPATIENT
Start: 2024-05-29 | End: 2024-05-30 | Stop reason: HOSPADM

## 2024-05-29 RX ORDER — POLYETHYLENE GLYCOL 3350 17 G/17G
17 POWDER, FOR SOLUTION ORAL DAILY PRN
Status: DISCONTINUED | OUTPATIENT
Start: 2024-05-29 | End: 2024-05-30 | Stop reason: HOSPADM

## 2024-05-29 RX ORDER — AMIODARONE HYDROCHLORIDE 200 MG/1
200 TABLET ORAL DAILY
Status: DISCONTINUED | OUTPATIENT
Start: 2024-05-29 | End: 2024-05-30 | Stop reason: HOSPADM

## 2024-05-29 RX ORDER — DIGOXIN 125 MCG
125 TABLET ORAL
Status: DISCONTINUED | OUTPATIENT
Start: 2024-05-31 | End: 2024-05-30 | Stop reason: HOSPADM

## 2024-05-29 RX ORDER — SODIUM CHLORIDE 9 MG/ML
INJECTION, SOLUTION INTRAVENOUS PRN
Status: DISCONTINUED | OUTPATIENT
Start: 2024-05-29 | End: 2024-05-30 | Stop reason: HOSPADM

## 2024-05-29 RX ORDER — ACETAMINOPHEN 650 MG/1
650 SUPPOSITORY RECTAL EVERY 6 HOURS PRN
Status: DISCONTINUED | OUTPATIENT
Start: 2024-05-29 | End: 2024-05-30 | Stop reason: HOSPADM

## 2024-05-29 RX ORDER — HYDROXYZINE HYDROCHLORIDE 25 MG/1
25 TABLET, FILM COATED ORAL 3 TIMES DAILY
COMMUNITY

## 2024-05-29 RX ORDER — SODIUM CHLORIDE 0.9 % (FLUSH) 0.9 %
5-40 SYRINGE (ML) INJECTION EVERY 12 HOURS SCHEDULED
Status: DISCONTINUED | OUTPATIENT
Start: 2024-05-29 | End: 2024-05-30 | Stop reason: HOSPADM

## 2024-05-29 RX ORDER — SODIUM CHLORIDE 0.9 % (FLUSH) 0.9 %
5-40 SYRINGE (ML) INJECTION PRN
Status: DISCONTINUED | OUTPATIENT
Start: 2024-05-29 | End: 2024-05-30 | Stop reason: HOSPADM

## 2024-05-29 RX ORDER — ACETAMINOPHEN 325 MG/1
650 TABLET ORAL EVERY 6 HOURS PRN
Status: DISCONTINUED | OUTPATIENT
Start: 2024-05-29 | End: 2024-05-30 | Stop reason: HOSPADM

## 2024-05-29 RX ADMIN — NITROGLYCERIN 0.5 INCH: 20 OINTMENT TOPICAL at 03:21

## 2024-05-29 RX ADMIN — APIXABAN 5 MG: 5 TABLET, FILM COATED ORAL at 16:22

## 2024-05-29 RX ADMIN — SODIUM CHLORIDE, PRESERVATIVE FREE 10 ML: 5 INJECTION INTRAVENOUS at 21:32

## 2024-05-29 RX ADMIN — APIXABAN 5 MG: 5 TABLET, FILM COATED ORAL at 21:32

## 2024-05-29 RX ADMIN — AMIODARONE HYDROCHLORIDE 200 MG: 200 TABLET ORAL at 16:22

## 2024-05-29 ASSESSMENT — HEART SCORE: ECG: NON-SPECIFC REPOLARIZATION DISTURBANCE/LBTB/PM

## 2024-05-29 ASSESSMENT — PAIN - FUNCTIONAL ASSESSMENT: PAIN_FUNCTIONAL_ASSESSMENT: NONE - DENIES PAIN

## 2024-05-29 NOTE — PROGRESS NOTES
Attending contacted at patient request for home dosage of hydroxyzine to be ordered. Orders received.

## 2024-05-29 NOTE — CARE COORDINATION
Social Work /Transition of Care:    Pt presents to the ED secondary to chest pain and generalized weakness from home.    Pt is admitted observation with chest pain.  Cardiology has been consulted.  Pt reports her cardiologist is Dr Winters.  SW met with pt who was alert and oriented x4, lying in bed.  Pt reports living alone in a one floor home.  Pt reports she has a cane and walker at home that she uses but has been having difficulty walking and taking care of herself at home.   Pt reports no hx of ONEIL and hx with [facility].  SW discussed discharge plans with pt who states she can not return home upon discharge.  SW discussed pt going to rehab and rehab options.  Pt stated she would like to go to [facility].  SW made referral to Jessika with [facility].  Pt will need PT/OT evals and insurance authorization prior to discharge.

## 2024-05-29 NOTE — H&P
follows\"  History reviewed. No pertinent family history.    REVIEW OF SYSTEMS:   Pertinent positives as noted in the HPI. All other systems reviewed and negative.    PHYSICAL EXAM:  BP (!) 184/62   Pulse 84   Temp 97.9 °F (36.6 °C) (Oral)   Resp 16   Ht 1.727 m (5' 8\")   Wt 83 kg (183 lb)   SpO2 95%   BMI 27.83 kg/m²   General appearance: No acute distress, pt is well oriented,  HEENT: Normal cephalic, atraumatic without obvious deformity. Pupils equal, round, and reactive to light.  Extra ocular muscles intact. Conjunctivae/corneas clear.  Neck: Supple, with full range of motion. No jugular venous distention. Trachea midline.  Respiratory:  normal exam  Cardiovascular:  regular rhythm with rate in acceptable range.  Abdomen:  soft, no tenderness or rebound pain, normal bowel sound  Musculoskeletal: No clubbing, cyanosis, trace. edema of bilateral lower extremities.    Skin: Normal skin color.  No rashes or lesions.  Neurologic:  Neurovascularly intact without any focal sensory/motor deficits. Cranial nerves: II-XII intact, grossly non-focal.  Psychiatric: Alert and oriented, thought content appropriate, normal insight    Relevant labs:  CE:  No results for input(s): \"CKTOTAL\", \"TROPONINI\" in the last 72 hours.  PT/INR: No results for input(s): \"INR\", \"APTT\" in the last 72 hours.  BNP: No results for input(s): \"BNP\" in the last 72 hours.  ESR: No results found for: \"SEDRATE\"  CRP: No results found for: \"CRP\"  D Dimer:   Lab Results   Component Value Date    DDIMER 3127 03/31/2023      Folate and B12: No results found for: \"LUSTLPIM35\", No results found for: \"FOLATE\"  Lactic Acid:   Lab Results   Component Value Date    LACTA 1.8 04/01/2023     Thyroid Studies:   Lab Results   Component Value Date    TSH 1.75 01/03/2024       Oupatient labs:  Lab Results   Component Value Date    CHOL 278 (H) 11/15/2022    TRIG 142 11/15/2022    HDL 53 11/15/2022    TSH 1.75 01/03/2024    INR 1.1 09/22/2023    LABA1C 5.7 (H)

## 2024-05-29 NOTE — ED PROVIDER NOTES
HPI:  5/29/24, Time: 12:40 AM EDT         Agueda Sierra is a 84 y.o. female history breast cancer history of A-fib history of palpitations presenting to the ED for chest pain, beginning 2 days ago.  The complaint has been persistent, moderate in severity, and worsened by nothing.  Patient reporting chest tightness midsternal nonradiating.  Patient reporting similar for last 2 days worsening today.  Patient does report feeling weak and fatigued she reports no syncopal event she does report shortness of breath with exertion she states this is a chronic problem she is on Eliquis for history of A-fib she reports no abdominal pain or vomiting is no diarrhea she reports no productive cough she reports no headache..  Patient does report bilateral weakness in her lower extremity    ROS:   Pertinent positives and negatives are stated within HPI, all other systems reviewed and are negative.  --------------------------------------------- PAST HISTORY ---------------------------------------------  Past Medical History:  has a past medical history of Breast cancer (HCC), Chronic a-fib (HCC), and Heart palpitations.    Past Surgical History:  has a past surgical history that includes knee surgery; Mastectomy (Left); Upper gastrointestinal endoscopy (N/A, 9/20/2023); and Colonoscopy (N/A, 9/22/2023).    Social History:  reports that she has been smoking cigarettes. She does not have any smokeless tobacco history on file. She reports that she does not drink alcohol and does not use drugs.    Family History: family history is not on file.     The patient’s home medications have been reviewed.    Allergies: Latex and Tape [adhesive tape]    ---------------------------------------------------PHYSICAL EXAM--------------------------------------    Constitutional/General: Alert and oriented x3, well appearing, non toxic in NAD  Head: Normocephalic and atraumatic  Eyes: PERRL, EOMI  Mouth: Oropharynx clear, handling secretions, no

## 2024-05-29 NOTE — PROGRESS NOTES
Balance Sitting:     Static: Supervision    Dynamic: SBA  Standing: SBA><Min A w/ w/w     Activity Tolerance Fair  Good   Visual/  Perceptual Glasses: yes                  Hand Dominance R   AROM (PROM) Strength Additional Info:    RUE  WFL 4/5 good  and wfl FMC/dexterity noted during ADL tasks       LUE WFL 4/5 good  and wfl FMC/dexterity noted during ADL tasks       Hearing: WFL   Sensation:  No c/o numbness or tingling   Tone: WFL   Edema: none noted    Comments: Upon arrival patient lying in bed.  Therapist educated pt on role of OT. RN clearance. At end of session, patient lying in bed (per pt request. Educated on benefits of sitting in chair - pt declined) with call light and phone within reach, all lines and tubes intact.  Overall patient demonstrated decreased independence and safety during completion of ADL/functional transfer/mobility tasks.  Pt would benefit from continued skilled OT to increase safety and independence with completion of ADL/IADL tasks for functional independence and quality of life.    Treatment: OT treatment provided this date includes: Facilitation of bed mobility (education/cues for body mechanics), unsupported sitting balance (addressing posture, weight shifting, dynamic reaching to prep for ADL's), functional transfers (various surfaces w/ education/cues for safety/hand placement), standing tolerance tasks (addressing posture, balance and activity tolerance while incorporating light functional reaching impacting ADLs and functional activity) and functional ambulation tasks with w/w (including to/ from bathroom and in preparation for item retrieval tasks w/ education/cuing on posture, w/w management and safety).  Therapist facilitated self-care retraining: UB/LB self-care tasks, toileting task and standing grooming tasks while educating pt on modified techniques, posture, safety and energy conservation techniques. Skilled monitoring of HR, O2 sats and pts response to

## 2024-05-29 NOTE — PROGRESS NOTES
Page sent out to Dr. Winters to verify that he is aware of consult and for any additional orders. Awaiting callback.

## 2024-05-29 NOTE — PROGRESS NOTES
4 Eyes Skin Assessment     NAME:  Agueda Sierra  YOB: 1940  MEDICAL RECORD NUMBER:  [id number]    The patient is being assessed for  Admission    I agree that at least one RN has performed a thorough Head to Toe Skin Assessment on the patient. ALL assessment sites listed below have been assessed.      Areas assessed by both nurses:    Head, Face, Ears, Shoulders, Back, Chest, Arms, Elbows, Hands, Sacrum. Buttock, Coccyx, Ischium, Legs. Feet and Heels, and Under Medical Devices         Does the Patient have a Wound? No noted wound(s)       Nba Prevention initiated by RN: Yes  Wound Care Orders initiated by RN: No    Pressure Injury (Stage 3,4, Unstageable, DTI, NWPT, and Complex wounds) if present, place Wound referral order by RN under : No    New Ostomies, if present place, Ostomy referral order under : No     Nurse 1 eSignature: Electronically signed by Renee Hallman RN on 5/29/24 at 5:08 PM EDT    **SHARE this note so that the co-signing nurse can place an eSignature**    Nurse 2 eSignature: Electronically signed by IZABELLA CALVILLO RN on 5/29/24 at 6:09 PM EDT

## 2024-05-30 VITALS
TEMPERATURE: 97.5 F | DIASTOLIC BLOOD PRESSURE: 82 MMHG | WEIGHT: 183 LBS | RESPIRATION RATE: 18 BRPM | SYSTOLIC BLOOD PRESSURE: 145 MMHG | HEIGHT: 68 IN | OXYGEN SATURATION: 90 % | HEART RATE: 78 BPM | BODY MASS INDEX: 27.74 KG/M2

## 2024-05-30 PROCEDURE — 2580000003 HC RX 258: Performed by: INTERNAL MEDICINE

## 2024-05-30 PROCEDURE — 97530 THERAPEUTIC ACTIVITIES: CPT

## 2024-05-30 PROCEDURE — G0378 HOSPITAL OBSERVATION PER HR: HCPCS

## 2024-05-30 PROCEDURE — 97161 PT EVAL LOW COMPLEX 20 MIN: CPT

## 2024-05-30 PROCEDURE — 6370000000 HC RX 637 (ALT 250 FOR IP): Performed by: INTERNAL MEDICINE

## 2024-05-30 PROCEDURE — 99239 HOSP IP/OBS DSCHRG MGMT >30: CPT | Performed by: INTERNAL MEDICINE

## 2024-05-30 PROCEDURE — 2T015 HOSPITALIST 2ND TOUCH: CPT | Performed by: INTERNAL MEDICINE

## 2024-05-30 RX ADMIN — APIXABAN 5 MG: 5 TABLET, FILM COATED ORAL at 09:58

## 2024-05-30 RX ADMIN — SODIUM CHLORIDE, PRESERVATIVE FREE 10 ML: 5 INJECTION INTRAVENOUS at 09:58

## 2024-05-30 RX ADMIN — AMIODARONE HYDROCHLORIDE 200 MG: 200 TABLET ORAL at 09:58

## 2024-05-30 RX ADMIN — HYDROXYZINE HYDROCHLORIDE 10 MG: 10 TABLET ORAL at 02:57

## 2024-05-30 NOTE — CARE COORDINATION
5/30/24, SW Update-Spoke with Jessika from [place].  Precert has been obtained.  Patient will need medically cleared for discharge to go to [place].  Patient can go by ambulette and will not need a form due to [facility] being the transport.  PAS/RR, face sheet and envelope is on soft chart.  SW to follow.      Leandra Hernandez BEENA  [facility] Case Management  [phone]

## 2024-05-30 NOTE — PROGRESS NOTES
activities in standing    Patient education  Pt educated on role of PT in acute setting, benefits of upright mobility, use of call light in room, AD proximity.    Patient response to education:   Pt verbalized understanding Pt demonstrated skill Pt requires further education in this area   Yes Partially  Reinforce     ASSESSMENT:    Conditions Requiring Skilled Therapeutic Intervention:    [x]Decreased strength     [x]Decreased ROM  [x]Decreased functional mobility  [x]Decreased balance   [x]Decreased endurance   [x]Decreased posture  [x]Decreased sensation  []Decreased coordination   []Decreased vision  [x]Decreased safety awareness   [x]Increased pain       Comments:  Pt was supine in bed upon therapist arrival, agreeable to session. Pt was pleasant and cooperative. Patient participated in mobility as documented above. Primary deficits observed this date included reduced safety awareness, attempt to abandon FWW x2, antalgic gait, and deconditioning. During ambulation, pt employed step-to patten, leading with the LLE. Pt demonstrated short step length, deteriorating posture, and very slow speed due to BLE weakness. Additional STS from EOB and dynamic balance activity reinforced the aforementioned deficits. After session, patient was assisted back to bed, per request, and was left in semi-supine position to comfort, with all needs met, questions answered, and call light within reach.     Treatment:  Patient practiced and was instructed in the following treatment:    Bed mobility - Cues provided for sequencing, physical assist provided as needed.  Transfers - Cues provided for safety, hand/feet placement, physical assist provided as needed.  Ambulation - Cues provided for AD proximity, physical assist provided as needed.  Skilled positioning - To prevent skin breakdown and contractures.  Skilled assessment of vitals.  Education - Provided for safety, role of PT in acute setting, benefits of upright mobility.    Pt's/  02495  [] Moderate Complexity PT evaluation 17599  [] High Complexity PT evaluation 17368  [] PT Re-evaluation 96355  [] Gait training 08171 0 minutes  [] Manual therapy 22233 0 minutes  [x] Therapeutic activities 35940 10 minutes  [] Therapeutic exercises 65803 0 minutes  [] Neuromuscular reeducation 45158 0 minutes     Anamaria Epp, PT, DPT  [id number]

## 2024-05-30 NOTE — DISCHARGE SUMMARY
Hospital Medicine Discharge Summary    Patient ID: Agueda Sierra      Patient's PCP: Isidro England DO    Admit Date: 5/29/2024     Discharge Date:   05/30/24    Admitting Physician: Alexandre Gallo MD     Discharge Physician: Alexandre Gallo MD     Discharge Diagnoses:       Active Hospital Problems    Diagnosis Date Noted    Chest pain [R07.9] 01/09/2024       The patient was seen and examined on day of discharge and this discharge summary is in conjunction with any daily progress note from day of discharge.    Hospital Course:     Patient admitted on 5/29/2024 for Chest pain  Ms. Agueda Sierra, a 84 y.o. year old female  with PMH of permanent atrial fibrillation on Eliquis, breast cancer s/p left mastectomy in remission, COPD. Pt presented to ED for evaluation of chest pain.   Pt was admitted for chest pain evaluation in January and again in March presenting for, coronary CT scan in January with middle LAD 30 to 40% stenosis, 10 to 10% proximal left circumflex.  Total Score was 66.  Patient was then admitted in March 2024 for chest pain had a stress test the results indicate a low risk candidate. Cardiology consulted for consideration for [facility].Dr. Winters not recommending any further workup at this time. PT/OT was consulted and patient was discharged to SNF in stable condition.     Exam:     BP (!) 145/82   Pulse 78   Temp 97.5 °F (36.4 °C)   Resp 18   Ht 1.727 m (5' 8\")   Wt 83 kg (183 lb)   SpO2 90%   BMI 27.83 kg/m²     General appearance: No apparent distress, appears stated age and cooperative.  HEENT: Pupils equal, round, and reactive to light. Conjunctivae/corneas clear.  Neck: Supple, with full range of motion. No jugular venous distention. Trachea midline.  Respiratory:  Normal respiratory effort. Clear to auscultation, bilaterally without Rales/Wheezes/Rhonchi.  Cardiovascular: Regular rate and rhythm with normal S1/S2 without murmurs, rubs or gallops.  Abdomen: Soft,  650 MG extended release tablet Take 1 tablet by mouth daily      hydrOXYzine HCl (ATARAX) 25 MG tablet Take 1 tablet by mouth in the morning, at noon, and at bedtime      digoxin (LANOXIN) 125 MCG tablet Take 1 tablet by mouth every 3 days  Qty: 30 tablet, Refills: 3      amiodarone (CORDARONE) 200 MG tablet Take 1 tablet by mouth daily      apixaban (ELIQUIS) 5 MG TABS tablet Take 1 tablet by mouth 2 times daily  Qty: 60 tablet, Refills: 0             Time Spent on discharge is 45 minutes in the examination, evaluation, counseling and review of medications and discharge plan.      Signed:    Alexandre Gallo MD   5/30/2024

## 2024-05-30 NOTE — CONSULTS
CARDIOLOGY CONSULTATION      Patient Name:  Agueda Sierra    :  1940    Reason for Consultation:   History of atrial fibrillation; noncritical coronary disease    History of Present Illness:   Agueda Sierra returns to [facility], following a history of feeling overwhelmingly fatigued and weak as well as somewhat short of breath with exertion.  Of note however is that she denies any significant recurrent palpitations since being on a combination of both digoxin and amiodarone.  She readily admits that she has been under significant mental stress living in her neighborhood but did not go into detail.  She admits to having some chest discomfort but attributes this to her anxiety and underwent a previous coronary CT angiogram within the past year which did not demonstrate critical coronary artery stenoses.  She is now admitted for further observation.  Also she notes difficulty eating and walking due to numbness in her left lower extremity following an epidural injection.  She feels unsteady on her feet and asked for consideration of physical therapy/rehabilitation..      Past Medical History:   has a past medical history of Breast cancer (HCC), Chronic a-fib (HCC), and Heart palpitations.    Surgical History:   has a past surgical history that includes knee surgery; Mastectomy (Left); Upper gastrointestinal endoscopy (N/A, 2023); and Colonoscopy (N/A, 2023).     Social History:   reports that she has been smoking cigarettes. She does not have any smokeless tobacco history on file. She reports that she does not drink alcohol and does not use drugs.     Family History:  family history is not on file.     Medications:  Prior to Admission medications    Medication Sig Start Date End Date Taking? Authorizing Provider   acetaminophen (TYLENOL 8 HOUR ARTHRITIS PAIN) 650 MG extended release tablet Take 1 tablet by mouth daily   Yes Provider, MD Iram   hydrOXYzine HCl  normal to touch. Coordination intact.    Pertinent Labs:  CBC:   Recent Labs     05/29/24  0045   WBC 7.6   RBC 4.13   HGB 11.5   HCT 37.8   MCV 91.5   MCH 27.8   MCHC 30.4*   RDW 16.0*      MPV 9.8      BMP:  Recent Labs     05/29/24  0045      K 4.0      CO2 23   BUN 14   CREATININE 0.8   GLUCOSE 86   CALCIUM 8.2*      ABGs:   Lab Results   Component Value Date/Time    PH 7.363 03/31/2023 05:00 PM    PO2 55.5 03/31/2023 05:00 PM    PCO2 42.3 03/31/2023 05:00 PM     INR:   No results for input(s): \"INR\" in the last 72 hours.    PRO-BNP:   Lab Results   Component Value Date    PROBNP 239 05/29/2024    PROBNP 602 (H) 04/04/2024      Cardiac Injury Profile:   Recent Labs     05/29/24  0045 05/29/24  0322 05/29/24  0728   TROPHS 33* Unable to perform testing: Specimen hemolyzed. 52*      Lipid Profile:   Lab Results   Component Value Date/Time    TRIG 142 11/15/2022 01:51 PM    HDL 53 11/15/2022 01:51 PM    CHOL 278 11/15/2022 01:51 PM      Thyroid:   Lab Results   Component Value Date    TSH 1.75 01/03/2024      Hemoglobin A1C: No components found for: \"HGBA1C\"   ECG:  See report    Radiology:  CTA NECK W CONTRAST    Result Date: 1/3/2024  Unremarkable CTA of the neck.     CTA HEAD W CONTRAST    Result Date: 1/3/2024  Unremarkable CTA of the head.     CT HEAD WO CONTRAST    Result Date: 1/3/2024  No acute intracranial abnormality.     XR CHEST PORTABLE    Result Date: 1/3/2024  Cardiomediastinal silhouette is within normal limits for size. No focal consolidation, sizeable pleural effusion, or gross pneumothorax. Metallic object overlies the left chest.     Vascular duplex lower extremity venous left    Result Date: 1/3/2024  No evidence of DVT in the left lower extremity.       Assessment:    Patient Active Problem List   Diagnosis Code    Malignant neoplasm of overlapping sites of left female breast (HCC) C50.812    Arthritis M19.90    Acute respiratory failure with hypoxia (HCC) J96.01

## 2024-05-30 NOTE — DISCHARGE INSTR - DIET

## 2024-05-30 NOTE — PROGRESS NOTES
[facility] Hospitalist Progress Note    SYNOPSIS:     Patient admitted on 2024 for Chest pain  Ms. Ageuda Sierra, a 84 y.o. year old female  with PMH of permanent atrial fibrillation on Eliquis, breast cancer s/p left mastectomy in remission, COPD. Pt presented to ED for evaluation of chest pain.   Pt was admitted for chest pain evaluation in January and again in March presenting for, coronary CT scan in January with middle LAD 30 to 40% stenosis, 10 to 10% proximal left circumflex.  Total Score was 66.  Patient was then admitted in 2024 for chest pain had a stress test the results indicate a low risk candidate. Cardiology consulted for consideration for [facility].      SUBJECTIVE:  Stable overnight. No other overnight issues reported.   Patient seen and examined  Records reviewed.       Temp (24hrs), Av.9 °F (36.6 °C), Min:97 °F (36.1 °C), Max:98.6 °F (37 °C)    DIET: ADULT DIET; Regular  CODE: Full Code    Intake/Output Summary (Last 24 hours) at 2024 1110  Last data filed at 2024 0917  Gross per 24 hour   Intake 180 ml   Output 0 ml   Net 180 ml       Review of Systems  All bolded are positive; please see HPI  General:  Fever, chills, diaphoresis, fatigue, malaise, night sweats, weight loss  Psychological:  Anxiety, disorientation, hallucinations.  ENT:  Epistaxis, headaches, vertigo, visual changes.  Cardiovascular:  Chest pain, irregular heartbeats, palpitations, paroxysmal nocturnal dyspnea.  Respiratory:  Shortness of breath, coughing, sputum production, hemoptysis, wheezing, orthopnea.  Gastrointestinal:  Nausea, vomiting, diarrhea, heartburn, constipation, abdominal pain, hematemesis, hematochezia, melena, acholic stools  Genito-Urinary:  Dysuria, urgency, frequency, hematuria  Musculoskeletal:  Joint pain, joint stiffness, joint swelling, muscle pain  Neurology:  Headache, focal neurological deficits, weakness, numbness, paresthesia  Derm:  Rashes, ulcers, excoriations,  bruising  Extremities:  Decreased ROM, peripheral edema, mottling      OBJECTIVE:    BP (!) 151/76   Pulse 83   Temp 98.6 °F (37 °C) (Temporal)   Resp 18   Ht 1.727 m (5' 8\")   Wt 83 kg (183 lb)   SpO2 92%   BMI 27.83 kg/m²     General appearance:  awake, alert, and oriented to person, place, time, and purpose; appears stated age and cooperative; no apparent distress no labored breathing  HEENT:  Conjunctivae/corneas clear.   Neck: Supple. No jugular venous distention.   Respiratory: symmetrical; clear to auscultation bilaterally; no wheezes; no rhonchi; no rales  Cardiovascular: rhythm regular; rate controlled; no murmurs  Abdomen: Soft, nontender, nondistended  Extremities:  peripheral pulses present; no peripheral edema; no ulcers  Musculoskeletal: No clubbing, cyanosis, no bilateral lower extremity edema. Brisk capillary refill.   Skin:  No rashes  on visible skin  Neurologic: awake, alert and following commands     ASSESSMENT and PLAN:    Chest pain   Hx of CAD  AF   Based on patient's workup in January and March 2024, she is overall low to moderate risk, given her recurrent symptoms and she never had left heart cath, will consult cardiology   Cardiology consulted  Awaiting decision on [facility]  Telemetry   Resumed Eliquis, digoxin and amiodarone for A-fib.       DVT Prophylaxis Eliquis   GI Prophylaxis [] PPI,  [] H2 Blocker,  [] Carafate,  [x] Diet/Tube Feeds   Disposition Patient requires continued admission due to awaiting cardiology consult. PT/OT consulted, possible placement needs   MDM [] Low, [x] Moderate,[]  High       Medications:  REVIEWED DAILY    Infusion Medications    sodium chloride       Scheduled Medications    sodium chloride flush  5-40 mL IntraVENous 2 times per day    amiodarone  200 mg Oral Daily    apixaban  5 mg Oral BID    [START ON 5/31/2024] digoxin  125 mcg Oral Q3 Days     PRN Meds: sodium chloride flush, sodium chloride, ondansetron **OR** ondansetron, polyethylene glycol,

## 2024-05-30 NOTE — CARE COORDINATION
5/30/24, SW met with patient at bedside and confirmed that patient does want to go to [place] for rehab.  Patient is looking to move from her home and going to live somewhere else.  Discussed OT eval was 19 and waiting for PT to see patient.  Observed patient having a cane in the room.  Call placed to Jessika and john paul left on seeing if she received referral.  SW to follow.      Case Management Assessment  Initial Evaluation    Date/Time of Evaluation: 5/30/2024 10:34 AM  Assessment Completed by: ROBERTO Arciniega    If patient is discharged prior to next notation, then this note serves as note for discharge by case management.    Patient Name: Agueda Sierra                   YOB: 1940  Diagnosis: Chest pain [R07.9]  Generalized weakness [R53.1]  Chest pain, unspecified type [R07.9]                   Date / Time: 5/29/2024 12:48 AM    Patient Admission Status: Observation   Readmission Risk (Low < 19, Mod (19-27), High > 27): Readmission Risk Score: 15.9    Current PCP: Isidro England, DO  PCP verified by CM? Yes    Chart Reviewed: Yes      History Provided by: Patient  Patient Orientation: Alert and Oriented    Patient Cognition: Alert    Hospitalization in the last 30 days (Readmission):  No    If yes, Readmission Assessment in CM Navigator will be completed.    Advance Directives:      Code Status: Full Code   Patient's Primary Decision Maker is:      Primary Decision Maker: KELSEY BAEZ - Grandchild - [phone]    Primary Decision Maker: marilyn martin - Child - [phone]    Discharge Planning:    Patient lives with:   Type of Home:    Primary Care Giver: Self  Patient Support Systems include: Family Members   Current Financial resources:    Current community resources:    Current services prior to admission:              Current DME:              Type of Home Care services:       ADLS  Prior functional level: Assistance with the following:, Mobility  Current functional level:  Assistance with the following:, Mobility    PT AM-PAC:   /24  OT AM-PAC: 19 /24    Family can provide assistance at DC: No  Would you like Case Management to discuss the discharge plan with any other family members/significant others, and if so, who? No  Plans to Return to Present Housing: Unknown at present  Other Identified Issues/Barriers to RETURNING to current housing: N/A  Potential Assistance needed at discharge:              Potential DME:    Patient expects to discharge to:    Plan for transportation at discharge:      Financial    Payor: [facility] MEDICARE / Plan: [employer] MEDIBLUE ESSENTIAL/PLUS / Product Type: *No Product type* /     Does insurance require precert for SNF: Yes    Potential assistance Purchasing Medications:    Meds-to-Beds request: No      [facility] - [place], [place] - [address] [phone] - F [phone]  [address] [address]  Phone: [phone] Fax: [phone]      Notes:    Factors facilitating achievement of predicted outcomes: Family support    Barriers to discharge: N/A    Additional Case Management Notes: See Above    The Plan for Transition of Care is related to the following treatment goals of Chest pain [R07.9]  Generalized weakness [R53.1]  Chest pain, unspecified type [R07.9]    IF APPLICABLE: The Patient and/or patient representative Agueda and her family were provided with a choice of provider and agrees with the discharge plan. Freedom of choice list with basic dialogue that supports the patient's individualized plan of care/goals and shares the quality data associated with the providers was provided to:     Patient Representative Name:       The Patient and/or Patient Representative Agree with the Discharge Plan?      ROBERTO Arciniega  Case Management Department  Ph: [phone] Fax: N/A

## 2024-05-30 NOTE — ACP (ADVANCE CARE PLANNING)
5/30/24, Advance Care Planning   Healthcare Decision Maker:    Primary Decision Maker: KELSEY BAEZ - Grandross - [phone]    Primary Decision Maker: marilyn martin - Child - [phone]    Click here to complete Healthcare Decision Makers including selection of the Healthcare Decision Maker Relationship (ie \"Primary\").

## 2024-05-30 NOTE — CARE COORDINATION
05/30/24 Discharge order noted Paula will pick pt up at 4:30pm BSRN, Patient and liaison notified Electronically signed by Hipolito Barrios RN CM on 5/30/2024 at 1:48 PM

## 2024-05-30 NOTE — DISCHARGE INSTR - COC
Continuity of Care Form    Patient Name: Agueda Sierra   :  1940  MRN:  [id number]    Admit date:  2024  Discharge date:  2024    Code Status Order: Full Code   Advance Directives:     Admitting Physician:  Alexandre Gallo MD  PCP: Isidro England DO    Discharging Nurse: Renee  Discharging Hospital Unit/Room#: 8204/8204-A  Discharging Unit Phone Number: [phone]    Emergency Contact:   Extended Emergency Contact Information  Primary Emergency Contact: KELSEY BAEZ  Work Phone: [phone]  Mobile Phone: [phone]  Relation: Grandchild  Preferred language: English   needed? No  Secondary Emergency Contact: eliezer sierra  Mobile Phone: [phone]  Relation: Child    Past Surgical History:  Past Surgical History:   Procedure Laterality Date    COLONOSCOPY N/A 2023    COLONOSCOPY POLYPECTOMY SNARE/COLD BIOPSY performed by Rob Deutsch MD at [facility] ENDOSCOPY    KNEE SURGERY      MASTECTOMY Left     UPPER GASTROINTESTINAL ENDOSCOPY N/A 2023    EGD BIOPSY performed by Rob Deutsch MD at [facility] ENDOSCOPY       Immunization History:     There is no immunization history on file for this patient.    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  Ventilator:    - No ventilator support    Rehab Therapies: Physical Therapy and Occupational Therapy  Weight Bearing Status/Restrictions: No weight bearing restrictions  Other Medical Equipment (for information only, NOT a DME order):  cane and cell phone and   Other Treatments: none    Patient's personal belongings (please select all that are sent with patient):  None    RN SIGNATURE:  Electronically signed by Renee Hallman RN on 5/30/24 at 3:36 PM EDT    CASE MANAGEMENT/SOCIAL WORK SECTION    Inpatient Status Date: ***    Readmission Risk Assessment Score:  Readmission Risk              Risk of Unplanned Readmission:  0           Discharging to Facility/ Agency   Name: [facility]  Address:  Phone:  Fax:    Dialysis Facility (if applicable)   Name:  Address:  Dialysis Schedule:  Phone:  Fax:    / signature: Electronically signed by ROBERTO Arciniega on 5/30/24 at 1:20 PM EDT    PHYSICIAN SECTION    Prognosis: Good    Condition at Discharge: Stable    Rehab Potential (if transferring to Rehab): Good    Recommended Labs or Other Treatments After Discharge: None    Physician Certification: I certify the above information and transfer of Agueda Sierra  is necessary for the continuing treatment of the diagnosis listed and that she requires Skilled Nursing Facility for less 30 days.     Update Admission H&P: No change in H&P    PHYSICIAN SIGNATURE:  Electronically signed by Alexandre Gallo MD on 5/30/24 at 1:41 PM EDT

## 2024-06-25 ENCOUNTER — CARE COORDINATION (OUTPATIENT)
Dept: CASE MANAGEMENT | Age: 84
End: 2024-06-25

## 2024-06-25 NOTE — CARE COORDINATION
Care Transitions Note    Initial Call - Call within 2 business days of discharge: Yes    Attempted to reach patient for transitions of care follow up. Unable to reach patient.    Outreach Attempts:   Spoke with granddaughter Lian. She states patient is in bed and to try back tomorrow. CTN acknowledged. Lian is on HIPAA. CTN called her back and left a HIPAA compliant message with contact information asking for a return call.     Patient: Agueda Sierra    Patient : 1940   MRN: <[id number]>    Reason for Admission: Generalized weakness  Discharge Date: 24  RURS: Readmission Risk Score: 15.9    Last Discharge Facility       Date Complaint Diagnosis Description Type Department Provider    24 Fatigue Generalized weakness ... ED to Hosp-Admission (Discharged) (ADMITTED) JÚNIOR ButlerS Alexandre Gorman MD; MADYSON Alcantar.            Was this an external facility discharge? Yes. Discharge Date: . Facility Name: [facility]     Follow Up Appointment:   Patient does not have a follow up appointment scheduled at time of call.  Unable to reach patient.      Plan for follow-up on next business day.      Pauly Beach RN

## 2024-06-26 ENCOUNTER — CARE COORDINATION (OUTPATIENT)
Dept: CASE MANAGEMENT | Age: 84
End: 2024-06-26

## 2024-06-26 DIAGNOSIS — R07.9 CHEST PAIN, UNSPECIFIED TYPE: Primary | ICD-10-CM

## 2024-06-26 PROCEDURE — 1111F DSCHRG MED/CURRENT MED MERGE: CPT | Performed by: FAMILY MEDICINE

## 2024-06-26 NOTE — CARE COORDINATION
Care Transitions Note    Initial Call - Call within 2 business days of discharge: Yes    Patient Current Location:  Home: [address] [place], [place] [id number]    Care Transition Nurse contacted the patient by telephone to perform post hospital discharge assessment, verified name and  as identifiers. Provided introduction to self, and explanation of the Care Transition Nurse role.     Patient: Agueda Sierra    Patient : 1940   MRN: [id number]    Reason for Admission: Generalized weakness  Discharge Date: 24  RURS: Readmission Risk Score: 15.9      Last Discharge Facility       Date Complaint Diagnosis Description Type Department Provider    24 Fatigue Generalized weakness ... ED to Hosp-Admission (Discharged) (ADMITTED) [facility] Alexandre Gallo MD; MADYSON Alcantar.            Was this an external facility discharge? Yes. Discharge Date: 24. Facility Name: [facility]    Additional needs identified to be addressed with provider   Needs a f/u appointment asap when patient is settled.             Method of communication with provider: none.    Patients top risk factors for readmission: medical condition-Weakness    Interventions to address risk factors:   Review of patient management of conditions/medications: with patient    Care Summary Note: Patient states she is doing okay. It is so-so from day to day. States she feels a little stronger. States she has been practicing walking. She ambulates by cane. She has a walker if needed. She is independent with ADL's. Denies sob, pain, cough, congestion, cp, swelling or elimination concerns. Appetite good. She states she is not a water drinker, but she is working on it. States she drinks soda and coffee. She declined [facility] because she is in between living conditions. She is staying with her niece until she can get moved into a new place. She cancelled her PCP f/u d/t lack of transportation. States she has spoke to the office about her status.

## 2024-10-30 LAB
BASOPHILS # BLD: 0.05 K/UL (ref 0–0.2)
BASOPHILS NFR BLD: 1 % (ref 0–2)
EOSINOPHIL # BLD: 0.05 K/UL (ref 0.05–0.5)
EOSINOPHILS RELATIVE PERCENT: 1 % (ref 0–6)
ERYTHROCYTE [DISTWIDTH] IN BLOOD BY AUTOMATED COUNT: 20.9 % (ref 11.5–15)
HBA1C MFR BLD: 5.5 % (ref 4–5.6)
HCT VFR BLD AUTO: 40.8 % (ref 34–48)
HGB BLD-MCNC: 12 G/DL (ref 11.5–15.5)
LYMPHOCYTES NFR BLD: 0.89 K/UL (ref 1.5–4)
LYMPHOCYTES RELATIVE PERCENT: 17 % (ref 20–42)
MCH RBC QN AUTO: 24.9 PG (ref 26–35)
MCHC RBC AUTO-ENTMCNC: 29.4 G/DL (ref 32–34.5)
MCV RBC AUTO: 84.6 FL (ref 80–99.9)
MONOCYTES NFR BLD: 0.38 K/UL (ref 0.1–0.95)
MONOCYTES NFR BLD: 7 % (ref 2–12)
NEUTROPHILS NFR BLD: 75 % (ref 43–80)
NEUTS SEG NFR BLD: 4.04 K/UL (ref 1.8–7.3)
PLATELET # BLD AUTO: 262 K/UL (ref 130–450)
PMV BLD AUTO: 10.1 FL (ref 7–12)
RBC # BLD AUTO: 4.82 M/UL (ref 3.5–5.5)
RBC # BLD: ABNORMAL 10*6/UL
WBC OTHER # BLD: 5.4 K/UL (ref 4.5–11.5)

## 2024-10-31 LAB
25(OH)D3 SERPL-MCNC: 12.6 NG/ML (ref 30–100)
25(OH)D3 SERPL-MCNC: NORMAL NG/ML
ALBUMIN SERPL-MCNC: 4.2 G/DL (ref 3.5–5.2)
ALBUMIN SERPL-MCNC: NORMAL G/DL (ref 3.5–5.2)
ALBUMIN/GLOB SERPL: NORMAL {RATIO} (ref 1–2.5)
ALP SERPL-CCNC: 88 U/L (ref 35–104)
ALP SERPL-CCNC: NORMAL U/L (ref 35–104)
ALT SERPL-CCNC: 19 U/L (ref 0–32)
ALT SERPL-CCNC: NORMAL U/L (ref 5–33)
AMYLASE P3 CFR SERPL: NORMAL %
ANION GAP SERPL CALCULATED.3IONS-SCNC: 17 MMOL/L (ref 7–16)
ANION GAP SERPL CALCULATED.3IONS-SCNC: NORMAL MMOL/L (ref 9–17)
AST SERPL-CCNC: 25 U/L (ref 0–31)
AST SERPL-CCNC: NORMAL U/L
BILIRUB SERPL-MCNC: 0.3 MG/DL (ref 0–1.2)
BILIRUB SERPL-MCNC: NORMAL MG/DL (ref 0.3–1.2)
BUN SERPL-MCNC: 18 MG/DL (ref 6–23)
BUN SERPL-MCNC: NORMAL MG/DL (ref 8–23)
BUN/CREAT SERPL: NORMAL (ref 9–20)
CALCIUM SERPL-MCNC: 10.2 MG/DL (ref 8.6–10.2)
CALCIUM SERPL-MCNC: NORMAL MG/DL (ref 8.6–10.4)
CHLORIDE SERPL-SCNC: 102 MMOL/L (ref 98–107)
CHLORIDE SERPL-SCNC: NORMAL MMOL/L (ref 98–107)
CHOLEST SERPL-MCNC: 276 MG/DL
CHOLEST SERPL-MCNC: NORMAL MG/DL
CHOLESTEROL/HDL RATIO: NORMAL
CO2 SERPL-SCNC: 22 MMOL/L (ref 22–29)
CO2 SERPL-SCNC: NORMAL MMOL/L (ref 20–31)
CREAT SERPL-MCNC: 1 MG/DL (ref 0.5–1)
CREAT SERPL-MCNC: NORMAL MG/DL (ref 0.5–0.9)
DATE LAST DOSE: ABNORMAL
DIGOXIN DOSE: ABNORMAL MG
DIGOXIN SERPL-MCNC: 0.6 NG/ML (ref 0.8–2)
EST. AVERAGE GLUCOSE BLD GHB EST-MCNC: NORMAL MG/DL
GFR, ESTIMATED: 55 ML/MIN/1.73M2
GFR, ESTIMATED: NORMAL ML/MIN/1.73M2
GLUCOSE P FAST SERPL-MCNC: 95 MG/DL (ref 74–99)
GLUCOSE P FAST SERPL-MCNC: NORMAL MG/DL (ref 70–99)
HBA1C MFR BLD: NORMAL % (ref 4–6)
HDLC SERPL-MCNC: 59 MG/DL
HDLC SERPL-MCNC: NORMAL MG/DL
HEMOGLOBIN A1C RETENTION TIME - VARIANT: NORMAL %
HEMOGLOBIN C PEAK: NORMAL %
LDLC SERPL CALC-MCNC: 196 MG/DL
LDLC SERPL CALC-MCNC: NORMAL MG/DL (ref 0–130)
P3 PEAK - VARIANT: NORMAL %
POTASSIUM SERPL-SCNC: 4.7 MMOL/L (ref 3.5–5)
POTASSIUM SERPL-SCNC: NORMAL MMOL/L (ref 3.7–5.3)
PROT SERPL-MCNC: 7.6 G/DL (ref 6.4–8.3)
PROT SERPL-MCNC: NORMAL G/DL (ref 6.4–8.3)
SODIUM SERPL-SCNC: 141 MMOL/L (ref 132–146)
SODIUM SERPL-SCNC: NORMAL MMOL/L (ref 135–144)
TRIGL SERPL-MCNC: 107 MG/DL
TRIGL SERPL-MCNC: NORMAL MG/DL
TSH SERPL DL<=0.05 MIU/L-ACNC: 1.56 UIU/ML (ref 0.27–4.2)
TSH SERPL DL<=0.05 MIU/L-ACNC: NORMAL UIU/ML (ref 0.3–5)
VARIANT HEMOGLOBIN PEAK: NORMAL %
VLDLC SERPL CALC-MCNC: 21 MG/DL
VLDLC SERPL CALC-MCNC: NORMAL MG/DL (ref 1–30)

## 2025-02-21 LAB
BASOPHILS # BLD: 0.02 K/UL (ref 0–0.2)
BASOPHILS NFR BLD: 0 % (ref 0–2)
EOSINOPHIL # BLD: 0.03 K/UL (ref 0.05–0.5)
EOSINOPHILS RELATIVE PERCENT: 1 % (ref 0–6)
ERYTHROCYTE [DISTWIDTH] IN BLOOD BY AUTOMATED COUNT: 16.9 % (ref 11.5–15)
HCT VFR BLD AUTO: 41.5 % (ref 34–48)
HGB BLD-MCNC: 12.7 G/DL (ref 11.5–15.5)
IMM GRANULOCYTES # BLD AUTO: <0.03 K/UL (ref 0–0.58)
IMM GRANULOCYTES NFR BLD: 0 % (ref 0–5)
LYMPHOCYTES NFR BLD: 1.06 K/UL (ref 1.5–4)
LYMPHOCYTES RELATIVE PERCENT: 20 % (ref 20–42)
MCH RBC QN AUTO: 26.5 PG (ref 26–35)
MCHC RBC AUTO-ENTMCNC: 30.6 G/DL (ref 32–34.5)
MCV RBC AUTO: 86.5 FL (ref 80–99.9)
MONOCYTES NFR BLD: 0.52 K/UL (ref 0.1–0.95)
MONOCYTES NFR BLD: 10 % (ref 2–12)
NEUTROPHILS NFR BLD: 69 % (ref 43–80)
NEUTS SEG NFR BLD: 3.75 K/UL (ref 1.8–7.3)
PLATELET # BLD AUTO: 249 K/UL (ref 130–450)
PMV BLD AUTO: 11 FL (ref 7–12)
RBC # BLD AUTO: 4.8 M/UL (ref 3.5–5.5)
WBC OTHER # BLD: 5.4 K/UL (ref 4.5–11.5)

## 2025-04-14 LAB
ANION GAP SERPL CALCULATED.3IONS-SCNC: 14 MMOL/L (ref 7–16)
BASOPHILS # BLD: 0.01 K/UL (ref 0–0.2)
BASOPHILS NFR BLD: 0 % (ref 0–2)
BUN SERPL-MCNC: 18 MG/DL (ref 6–23)
CALCIUM SERPL-MCNC: 10 MG/DL (ref 8.6–10.2)
CHLORIDE SERPL-SCNC: 101 MMOL/L (ref 98–107)
CO2 SERPL-SCNC: 23 MMOL/L (ref 22–29)
CREAT SERPL-MCNC: 0.9 MG/DL (ref 0.5–1)
DATE LAST DOSE: ABNORMAL
DIGOXIN DOSE TIME: ABNORMAL
DIGOXIN DOSE: ABNORMAL MG
DIGOXIN SERPL-MCNC: 0.5 NG/ML (ref 0.8–2)
EOSINOPHIL # BLD: 0.02 K/UL (ref 0.05–0.5)
EOSINOPHILS RELATIVE PERCENT: 0 % (ref 0–6)
ERYTHROCYTE [DISTWIDTH] IN BLOOD BY AUTOMATED COUNT: 17.8 % (ref 11.5–15)
GFR, ESTIMATED: 61 ML/MIN/1.73M2
GLUCOSE SERPL-MCNC: 99 MG/DL (ref 74–99)
HCT VFR BLD AUTO: 40.4 % (ref 34–48)
HGB BLD-MCNC: 12.2 G/DL (ref 11.5–15.5)
IMM GRANULOCYTES # BLD AUTO: <0.03 K/UL (ref 0–0.58)
IMM GRANULOCYTES NFR BLD: 0 % (ref 0–5)
LYMPHOCYTES NFR BLD: 1.06 K/UL (ref 1.5–4)
LYMPHOCYTES RELATIVE PERCENT: 23 % (ref 20–42)
MCH RBC QN AUTO: 26.2 PG (ref 26–35)
MCHC RBC AUTO-ENTMCNC: 30.2 G/DL (ref 32–34.5)
MCV RBC AUTO: 86.7 FL (ref 80–99.9)
MONOCYTES NFR BLD: 0.43 K/UL (ref 0.1–0.95)
MONOCYTES NFR BLD: 10 % (ref 2–12)
NEUTROPHILS NFR BLD: 66 % (ref 43–80)
NEUTS SEG NFR BLD: 3.01 K/UL (ref 1.8–7.3)
PLATELET # BLD AUTO: 224 K/UL (ref 130–450)
PMV BLD AUTO: 9.9 FL (ref 7–12)
POTASSIUM SERPL-SCNC: 4.9 MMOL/L (ref 3.5–5)
RBC # BLD AUTO: 4.66 M/UL (ref 3.5–5.5)
SODIUM SERPL-SCNC: 138 MMOL/L (ref 132–146)
WBC OTHER # BLD: 4.5 K/UL (ref 4.5–11.5)

## 2025-07-17 ENCOUNTER — APPOINTMENT (OUTPATIENT)
Dept: GENERAL RADIOLOGY | Age: 85
DRG: 193 | End: 2025-07-17
Payer: MEDICARE

## 2025-07-17 ENCOUNTER — HOSPITAL ENCOUNTER (INPATIENT)
Age: 85
LOS: 5 days | Discharge: HOME HEALTH | DRG: 193 | End: 2025-07-22
Attending: EMERGENCY MEDICINE | Admitting: FAMILY MEDICINE
Payer: MEDICARE

## 2025-07-17 ENCOUNTER — APPOINTMENT (OUTPATIENT)
Dept: CT IMAGING | Age: 85
DRG: 193 | End: 2025-07-17
Payer: MEDICARE

## 2025-07-17 DIAGNOSIS — J96.01 ACUTE RESPIRATORY FAILURE WITH HYPOXIA (HCC): Primary | ICD-10-CM

## 2025-07-17 LAB
ANION GAP SERPL CALCULATED.3IONS-SCNC: 11 MMOL/L (ref 7–16)
BASOPHILS # BLD: 0 K/UL (ref 0–0.2)
BASOPHILS NFR BLD: 0 % (ref 0–2)
BNP SERPL-MCNC: 2392 PG/ML (ref 0–450)
BUN SERPL-MCNC: 15 MG/DL (ref 8–23)
CALCIUM SERPL-MCNC: 9.3 MG/DL (ref 8.8–10.2)
CHLORIDE SERPL-SCNC: 100 MMOL/L (ref 98–107)
CO2 SERPL-SCNC: 27 MMOL/L (ref 22–29)
CREAT SERPL-MCNC: 1 MG/DL (ref 0.5–1)
EOSINOPHIL # BLD: 0 K/UL (ref 0.05–0.5)
EOSINOPHILS RELATIVE PERCENT: 0 % (ref 0–6)
ERYTHROCYTE [DISTWIDTH] IN BLOOD BY AUTOMATED COUNT: 18.6 % (ref 11.5–15)
GFR, ESTIMATED: 55 ML/MIN/1.73M2
GLUCOSE SERPL-MCNC: 111 MG/DL (ref 74–99)
HCT VFR BLD AUTO: 36.1 % (ref 34–48)
HGB BLD-MCNC: 11.2 G/DL (ref 11.5–15.5)
LYMPHOCYTES NFR BLD: 0.64 K/UL (ref 1.5–4)
LYMPHOCYTES RELATIVE PERCENT: 5 % (ref 20–42)
MCH RBC QN AUTO: 26.9 PG (ref 26–35)
MCHC RBC AUTO-ENTMCNC: 31 G/DL (ref 32–34.5)
MCV RBC AUTO: 86.8 FL (ref 80–99.9)
MONOCYTES NFR BLD: 0.95 K/UL (ref 0.1–0.95)
MONOCYTES NFR BLD: 8 % (ref 2–12)
NEUTROPHILS NFR BLD: 87 % (ref 43–80)
NEUTS SEG NFR BLD: 10.61 K/UL (ref 1.8–7.3)
NUCLEATED RED BLOOD CELLS: 1 PER 100 WBC
PLATELET # BLD AUTO: 458 K/UL (ref 130–450)
PMV BLD AUTO: 9.7 FL (ref 7–12)
POTASSIUM SERPL-SCNC: 4.1 MMOL/L (ref 3.5–5.1)
RBC # BLD AUTO: 4.16 M/UL (ref 3.5–5.5)
RBC # BLD: NORMAL 10*6/UL
SODIUM SERPL-SCNC: 138 MMOL/L (ref 136–145)
TROPONIN I SERPL HS-MCNC: 91 NG/L (ref 0–14)
TROPONIN I SERPL HS-MCNC: 99 NG/L (ref 0–14)
WBC OTHER # BLD: 12.2 K/UL (ref 4.5–11.5)

## 2025-07-17 PROCEDURE — 93005 ELECTROCARDIOGRAM TRACING: CPT | Performed by: EMERGENCY MEDICINE

## 2025-07-17 PROCEDURE — 6360000004 HC RX CONTRAST MEDICATION: Performed by: RADIOLOGY

## 2025-07-17 PROCEDURE — 87040 BLOOD CULTURE FOR BACTERIA: CPT

## 2025-07-17 PROCEDURE — 6370000000 HC RX 637 (ALT 250 FOR IP): Performed by: FAMILY MEDICINE

## 2025-07-17 PROCEDURE — 85025 COMPLETE CBC W/AUTO DIFF WBC: CPT

## 2025-07-17 PROCEDURE — 99285 EMERGENCY DEPT VISIT HI MDM: CPT

## 2025-07-17 PROCEDURE — 2580000003 HC RX 258: Performed by: EMERGENCY MEDICINE

## 2025-07-17 PROCEDURE — 84484 ASSAY OF TROPONIN QUANT: CPT

## 2025-07-17 PROCEDURE — 6360000002 HC RX W HCPCS: Performed by: EMERGENCY MEDICINE

## 2025-07-17 PROCEDURE — 71045 X-RAY EXAM CHEST 1 VIEW: CPT

## 2025-07-17 PROCEDURE — 2500000003 HC RX 250 WO HCPCS: Performed by: EMERGENCY MEDICINE

## 2025-07-17 PROCEDURE — 71275 CT ANGIOGRAPHY CHEST: CPT

## 2025-07-17 PROCEDURE — 2060000000 HC ICU INTERMEDIATE R&B

## 2025-07-17 PROCEDURE — 80048 BASIC METABOLIC PNL TOTAL CA: CPT

## 2025-07-17 PROCEDURE — 6370000000 HC RX 637 (ALT 250 FOR IP): Performed by: EMERGENCY MEDICINE

## 2025-07-17 PROCEDURE — 83880 ASSAY OF NATRIURETIC PEPTIDE: CPT

## 2025-07-17 RX ORDER — ONDANSETRON 4 MG/1
4 TABLET, FILM COATED ORAL EVERY 8 HOURS PRN
COMMUNITY

## 2025-07-17 RX ORDER — IOPAMIDOL 755 MG/ML
75 INJECTION, SOLUTION INTRAVASCULAR
Status: COMPLETED | OUTPATIENT
Start: 2025-07-17 | End: 2025-07-17

## 2025-07-17 RX ORDER — ACETAMINOPHEN 325 MG/1
650 TABLET ORAL ONCE
Status: COMPLETED | OUTPATIENT
Start: 2025-07-17 | End: 2025-07-17

## 2025-07-17 RX ORDER — DIGOXIN 125 MCG
62.5 TABLET ORAL
Status: DISCONTINUED | OUTPATIENT
Start: 2025-07-18 | End: 2025-07-22 | Stop reason: HOSPADM

## 2025-07-17 RX ORDER — DIGOXIN 125 MCG
62.5 TABLET ORAL
COMMUNITY

## 2025-07-17 RX ORDER — GUAIFENESIN/DEXTROMETHORPHAN 100-10MG/5
10 SYRUP ORAL EVERY 6 HOURS PRN
Status: DISCONTINUED | OUTPATIENT
Start: 2025-07-17 | End: 2025-07-22 | Stop reason: HOSPADM

## 2025-07-17 RX ORDER — IPRATROPIUM BROMIDE AND ALBUTEROL SULFATE 2.5; .5 MG/3ML; MG/3ML
1 SOLUTION RESPIRATORY (INHALATION) EVERY 4 HOURS PRN
Status: DISCONTINUED | OUTPATIENT
Start: 2025-07-17 | End: 2025-07-18

## 2025-07-17 RX ORDER — BENZONATATE 200 MG/1
200 CAPSULE ORAL 3 TIMES DAILY PRN
Status: ON HOLD | COMMUNITY
End: 2025-07-22 | Stop reason: HOSPADM

## 2025-07-17 RX ORDER — ACETAMINOPHEN 325 MG/1
650 TABLET ORAL EVERY 6 HOURS PRN
Status: DISCONTINUED | OUTPATIENT
Start: 2025-07-17 | End: 2025-07-22 | Stop reason: HOSPADM

## 2025-07-17 RX ORDER — AMIODARONE HYDROCHLORIDE 200 MG/1
200 TABLET ORAL DAILY
Status: DISCONTINUED | OUTPATIENT
Start: 2025-07-18 | End: 2025-07-22 | Stop reason: HOSPADM

## 2025-07-17 RX ADMIN — DOXYCYCLINE 100 MG: 100 INJECTION, POWDER, LYOPHILIZED, FOR SOLUTION INTRAVENOUS at 15:52

## 2025-07-17 RX ADMIN — IOPAMIDOL 75 ML: 755 INJECTION, SOLUTION INTRAVENOUS at 16:10

## 2025-07-17 RX ADMIN — APIXABAN 5 MG: 5 TABLET, FILM COATED ORAL at 21:27

## 2025-07-17 RX ADMIN — WATER 2000 MG: 1 INJECTION INTRAMUSCULAR; INTRAVENOUS; SUBCUTANEOUS at 15:43

## 2025-07-17 RX ADMIN — ACETAMINOPHEN 650 MG: 325 TABLET ORAL at 15:43

## 2025-07-17 ASSESSMENT — PAIN DESCRIPTION - LOCATION
LOCATION: LEG
LOCATION: FOOT

## 2025-07-17 ASSESSMENT — PAIN SCALES - GENERAL
PAINLEVEL_OUTOF10: 0
PAINLEVEL_OUTOF10: 0
PAINLEVEL_OUTOF10: 10
PAINLEVEL_OUTOF10: 10

## 2025-07-17 ASSESSMENT — LIFESTYLE VARIABLES
HOW MANY STANDARD DRINKS CONTAINING ALCOHOL DO YOU HAVE ON A TYPICAL DAY: 1 OR 2
HOW OFTEN DO YOU HAVE A DRINK CONTAINING ALCOHOL: MONTHLY OR LESS

## 2025-07-17 ASSESSMENT — PAIN DESCRIPTION - DESCRIPTORS: DESCRIPTORS: TINGLING;NUMBNESS

## 2025-07-17 ASSESSMENT — PAIN - FUNCTIONAL ASSESSMENT
PAIN_FUNCTIONAL_ASSESSMENT: 0-10
PAIN_FUNCTIONAL_ASSESSMENT: PREVENTS OR INTERFERES SOME ACTIVE ACTIVITIES AND ADLS

## 2025-07-17 ASSESSMENT — PAIN DESCRIPTION - PAIN TYPE: TYPE: NEUROPATHIC PAIN

## 2025-07-17 ASSESSMENT — PAIN DESCRIPTION - FREQUENCY: FREQUENCY: CONTINUOUS

## 2025-07-17 ASSESSMENT — PAIN DESCRIPTION - ORIENTATION
ORIENTATION: LEFT;RIGHT
ORIENTATION: RIGHT;LEFT

## 2025-07-17 ASSESSMENT — PAIN DESCRIPTION - ONSET: ONSET: ON-GOING

## 2025-07-17 NOTE — PROGRESS NOTES
Database initiated. Patient is A&O comes in from home alone. States she uses a cane and is RA at baseline. NO LEFT ARM d/t mastectomy.

## 2025-07-17 NOTE — ED PROVIDER NOTES
85-year-old female on Eliquis for atrial fibrillation presents to the emergency department with shortness of breath hypoxia per EMS and bilateral lower extremity weakness.  Patient reports she is also noted some swelling she reports no chest pain no nausea vomiting dye abdominal pain but has had some increased difficulty getting around her assisted living.  She states no nausea vomiting dye abdominal pain review of chart does show history of chronic anemia and congestive heart failure    The history is provided by the patient.   Shortness of Breath  Severity:  Moderate  Onset quality:  Gradual  Duration:  2 days  Timing:  Intermittent  Progression:  Waxing and waning  Chronicity:  New  Relieved by:  Nothing  Worsened by:  Nothing  Ineffective treatments:  None tried  Associated symptoms: no chest pain, no claudication, no cough, no ear pain, no fever, no headaches, no rash, no sore throat, no syncope, no vomiting and no wheezing         Review of Systems   Constitutional:  Negative for chills and fever.   HENT:  Negative for ear pain, sinus pressure and sore throat.    Eyes:  Negative for pain, discharge and redness.   Respiratory:  Positive for shortness of breath. Negative for cough and wheezing.    Cardiovascular:  Negative for chest pain, claudication and syncope.   Gastrointestinal:  Negative for abdominal distention, diarrhea, nausea and vomiting.   Genitourinary:  Negative for dysuria and frequency.   Musculoskeletal:  Negative for arthralgias and back pain.   Skin:  Negative for rash and wound.   Neurological:  Negative for weakness and headaches.   Hematological:  Negative for adenopathy.   All other systems reviewed and are negative.       Physical Exam  Constitutional:       Appearance: She is well-developed.   HENT:      Head: Normocephalic and atraumatic.   Eyes:      Extraocular Movements: Extraocular movements intact.      Pupils: Pupils are equal, round, and reactive to light.   Cardiovascular:       Rate and Rhythm: Normal rate and regular rhythm.      Pulses:           Dorsalis pedis pulses are 2+ on the right side and 2+ on the left side.   Pulmonary:      Effort: Pulmonary effort is normal.      Breath sounds: Normal breath sounds. No decreased breath sounds, wheezing, rhonchi or rales.   Musculoskeletal:      Cervical back: Normal range of motion and neck supple.      Right lower leg: No tenderness. No edema.      Left lower leg: No tenderness. No edema.   Skin:     General: Skin is warm.      Capillary Refill: Capillary refill takes less than 2 seconds.   Neurological:      General: No focal deficit present.      Mental Status: She is alert and oriented to person, place, and time.          Procedures     MDM  Number of Diagnoses or Management Options  Diagnosis management comments: Patient was seen and examined labs and imaging were ordered.  On evaluation patient is hypoxic labs and imaging reviewed reveals evidence of pneumonia antibiotics were initiated.  Blood culture sent patient was admitted to Dr. Isidro England for further evaluation         ED Course as of 07/17/25 2009   Thu Jul 17, 2025   1207 On Eliquis [CF]   1310 EKG:  This EKG is signed and interpreted by the EP.    Time: 12:30  Rate: 82  Rhythm: Sinus  Interpretation: no acute changes  Comparison: Bifascicular block is stable   [CF]      ED Course User Index  [CF] Bobo Ricardo, DO       Is this patient to be included in the SEP-1 core measure? No Exclusion criteria - the patient is NOT to be included for SEP-1 Core Measure due to: 2+ SIRS criteria are not met    --------------------------------------------- PAST HISTORY ---------------------------------------------  Past Medical History:  has a past medical history of Breast cancer (HCC), Chronic a-fib (HCC), and Heart palpitations.    Past Surgical History:  has a past surgical history that includes knee surgery; Mastectomy (Left); Upper gastrointestinal endoscopy (N/A, 9/20/2023); and

## 2025-07-17 NOTE — ED NOTES
ED to Inpatient Handoff Report    Notified Margaret that electronic handoff available and patient ready for transport to room 433.    Safety Risks: Risk of falls    Patient in Restraints: no    Constant Observer or Patient : no    Telemetry Monitoring Ordered :No           Order to transfer to unit without monitor:N/A    Last MEWS:  Time completed: 1822    Deterioration Index Score:   Predictive Model Details          36 (Caution)  Factor Value    Calculated 7/17/2025 18:19 36% Age 85 years old    Deterioration Index Model 31% Supplemental oxygen Nasal cannula     19% Respiratory rate 21     7% WBC count abnormal (12.2 k/uL)     2% Systolic 124     2% Sodium 138 mmol/L     1% Platelet count abnormal (458 k/uL)     1% Temperature 99.4 °F (37.4 °C)     1% Pulse oximetry 94 %     1% Potassium 4.1 mmol/L     0% Hematocrit 36.1 %     0% Pulse 73        Vitals:    07/17/25 1555 07/17/25 1730 07/17/25 1806 07/17/25 1818   BP:    (!) 124/57   Pulse:  67 65 73   Resp:  15 20 21   Temp: 99.4 °F (37.4 °C)      TempSrc: Oral      SpO2:   94%    Weight:       Height:             Opportunity for questions and clarification was provided.

## 2025-07-18 LAB
ALBUMIN SERPL-MCNC: 2.5 G/DL (ref 3.5–5.2)
ALP SERPL-CCNC: 77 U/L (ref 35–104)
ALT SERPL-CCNC: 12 U/L (ref 0–35)
ANION GAP SERPL CALCULATED.3IONS-SCNC: 10 MMOL/L (ref 7–16)
AST SERPL-CCNC: 27 U/L (ref 0–35)
B PARAP IS1001 DNA NPH QL NAA+NON-PROBE: NOT DETECTED
B PERT DNA SPEC QL NAA+PROBE: NOT DETECTED
BASOPHILS # BLD: 0.03 K/UL (ref 0–0.2)
BASOPHILS NFR BLD: 0 % (ref 0–2)
BILIRUB SERPL-MCNC: <0.2 MG/DL (ref 0–1.2)
BUN SERPL-MCNC: 15 MG/DL (ref 8–23)
C PNEUM DNA NPH QL NAA+NON-PROBE: NOT DETECTED
CALCIUM SERPL-MCNC: 9.3 MG/DL (ref 8.8–10.2)
CHLORIDE SERPL-SCNC: 101 MMOL/L (ref 98–107)
CO2 SERPL-SCNC: 28 MMOL/L (ref 22–29)
CREAT SERPL-MCNC: 0.9 MG/DL (ref 0.5–1)
EKG ATRIAL RATE: 82 BPM
EKG P-R INTERVAL: 178 MS
EKG Q-T INTERVAL: 398 MS
EKG QRS DURATION: 122 MS
EKG QTC CALCULATION (BAZETT): 464 MS
EKG R AXIS: -56 DEGREES
EKG T AXIS: 49 DEGREES
EKG VENTRICULAR RATE: 82 BPM
EOSINOPHIL # BLD: 0.14 K/UL (ref 0.05–0.5)
EOSINOPHILS RELATIVE PERCENT: 1 % (ref 0–6)
ERYTHROCYTE [DISTWIDTH] IN BLOOD BY AUTOMATED COUNT: 18.6 % (ref 11.5–15)
FLUAV RNA NPH QL NAA+NON-PROBE: NOT DETECTED
FLUBV RNA NPH QL NAA+NON-PROBE: NOT DETECTED
GFR, ESTIMATED: 61 ML/MIN/1.73M2
GLUCOSE SERPL-MCNC: 103 MG/DL (ref 74–99)
HADV DNA NPH QL NAA+NON-PROBE: NOT DETECTED
HCOV 229E RNA NPH QL NAA+NON-PROBE: NOT DETECTED
HCOV HKU1 RNA NPH QL NAA+NON-PROBE: NOT DETECTED
HCOV NL63 RNA NPH QL NAA+NON-PROBE: NOT DETECTED
HCOV OC43 RNA NPH QL NAA+NON-PROBE: NOT DETECTED
HCT VFR BLD AUTO: 33.4 % (ref 34–48)
HGB BLD-MCNC: 10.3 G/DL (ref 11.5–15.5)
HMPV RNA NPH QL NAA+NON-PROBE: NOT DETECTED
HPIV1 RNA NPH QL NAA+NON-PROBE: NOT DETECTED
HPIV2 RNA NPH QL NAA+NON-PROBE: NOT DETECTED
HPIV3 RNA NPH QL NAA+NON-PROBE: NOT DETECTED
HPIV4 RNA NPH QL NAA+NON-PROBE: NOT DETECTED
IMM GRANULOCYTES # BLD AUTO: 0.07 K/UL (ref 0–0.58)
IMM GRANULOCYTES NFR BLD: 1 % (ref 0–5)
LYMPHOCYTES NFR BLD: 0.74 K/UL (ref 1.5–4)
LYMPHOCYTES RELATIVE PERCENT: 7 % (ref 20–42)
M PNEUMO DNA NPH QL NAA+NON-PROBE: NOT DETECTED
MCH RBC QN AUTO: 26.8 PG (ref 26–35)
MCHC RBC AUTO-ENTMCNC: 30.8 G/DL (ref 32–34.5)
MCV RBC AUTO: 86.8 FL (ref 80–99.9)
MONOCYTES NFR BLD: 1.03 K/UL (ref 0.1–0.95)
MONOCYTES NFR BLD: 10 % (ref 2–12)
NEUTROPHILS NFR BLD: 81 % (ref 43–80)
NEUTS SEG NFR BLD: 8.73 K/UL (ref 1.8–7.3)
PLATELET # BLD AUTO: 406 K/UL (ref 130–450)
PMV BLD AUTO: 9.5 FL (ref 7–12)
POTASSIUM SERPL-SCNC: 3.7 MMOL/L (ref 3.5–5.1)
PROCALCITONIN SERPL-MCNC: 0.11 NG/ML (ref 0–0.08)
PROT SERPL-MCNC: 5.9 G/DL (ref 6.4–8.3)
RBC # BLD AUTO: 3.85 M/UL (ref 3.5–5.5)
RSV RNA NPH QL NAA+NON-PROBE: NOT DETECTED
RV+EV RNA NPH QL NAA+NON-PROBE: NOT DETECTED
SARS-COV-2 RNA NPH QL NAA+NON-PROBE: NOT DETECTED
SODIUM SERPL-SCNC: 139 MMOL/L (ref 136–145)
SPECIMEN DESCRIPTION: NORMAL
WBC OTHER # BLD: 10.7 K/UL (ref 4.5–11.5)

## 2025-07-18 PROCEDURE — 2580000003 HC RX 258: Performed by: FAMILY MEDICINE

## 2025-07-18 PROCEDURE — 6360000002 HC RX W HCPCS

## 2025-07-18 PROCEDURE — 84145 PROCALCITONIN (PCT): CPT

## 2025-07-18 PROCEDURE — 6360000002 HC RX W HCPCS: Performed by: FAMILY MEDICINE

## 2025-07-18 PROCEDURE — 93010 ELECTROCARDIOGRAM REPORT: CPT | Performed by: INTERNAL MEDICINE

## 2025-07-18 PROCEDURE — 94640 AIRWAY INHALATION TREATMENT: CPT

## 2025-07-18 PROCEDURE — 2060000000 HC ICU INTERMEDIATE R&B

## 2025-07-18 PROCEDURE — 6370000000 HC RX 637 (ALT 250 FOR IP)

## 2025-07-18 PROCEDURE — 2500000003 HC RX 250 WO HCPCS

## 2025-07-18 PROCEDURE — 94669 MECHANICAL CHEST WALL OSCILL: CPT

## 2025-07-18 PROCEDURE — 6370000000 HC RX 637 (ALT 250 FOR IP): Performed by: FAMILY MEDICINE

## 2025-07-18 PROCEDURE — 2580000003 HC RX 258

## 2025-07-18 PROCEDURE — 87899 AGENT NOS ASSAY W/OPTIC: CPT

## 2025-07-18 PROCEDURE — 80053 COMPREHEN METABOLIC PANEL: CPT

## 2025-07-18 PROCEDURE — 94664 DEMO&/EVAL PT USE INHALER: CPT

## 2025-07-18 PROCEDURE — 85025 COMPLETE CBC W/AUTO DIFF WBC: CPT

## 2025-07-18 PROCEDURE — 0202U NFCT DS 22 TRGT SARS-COV-2: CPT

## 2025-07-18 PROCEDURE — 87449 NOS EACH ORGANISM AG IA: CPT

## 2025-07-18 RX ORDER — ARFORMOTEROL TARTRATE 15 UG/2ML
15 SOLUTION RESPIRATORY (INHALATION)
Status: DISCONTINUED | OUTPATIENT
Start: 2025-07-18 | End: 2025-07-22 | Stop reason: HOSPADM

## 2025-07-18 RX ORDER — ALBUTEROL SULFATE 0.83 MG/ML
2.5 SOLUTION RESPIRATORY (INHALATION) EVERY 6 HOURS PRN
Status: DISCONTINUED | OUTPATIENT
Start: 2025-07-18 | End: 2025-07-22 | Stop reason: HOSPADM

## 2025-07-18 RX ORDER — BUDESONIDE 0.5 MG/2ML
0.5 INHALANT ORAL
Status: DISCONTINUED | OUTPATIENT
Start: 2025-07-18 | End: 2025-07-22 | Stop reason: HOSPADM

## 2025-07-18 RX ORDER — METHYLPREDNISOLONE SODIUM SUCCINATE 40 MG/ML
40 INJECTION INTRAMUSCULAR; INTRAVENOUS EVERY 12 HOURS
Status: DISCONTINUED | OUTPATIENT
Start: 2025-07-18 | End: 2025-07-20

## 2025-07-18 RX ORDER — IPRATROPIUM BROMIDE AND ALBUTEROL SULFATE 2.5; .5 MG/3ML; MG/3ML
1 SOLUTION RESPIRATORY (INHALATION)
Status: DISCONTINUED | OUTPATIENT
Start: 2025-07-18 | End: 2025-07-22 | Stop reason: HOSPADM

## 2025-07-18 RX ORDER — GUAIFENESIN 400 MG/1
400 TABLET ORAL 3 TIMES DAILY
Status: DISCONTINUED | OUTPATIENT
Start: 2025-07-18 | End: 2025-07-22 | Stop reason: HOSPADM

## 2025-07-18 RX ADMIN — GUAIFENESIN 400 MG: 400 TABLET ORAL at 20:05

## 2025-07-18 RX ADMIN — WATER 1000 MG: 1 INJECTION INTRAMUSCULAR; INTRAVENOUS; SUBCUTANEOUS at 14:32

## 2025-07-18 RX ADMIN — DIGOXIN 62.5 MCG: 0.12 TABLET ORAL at 20:05

## 2025-07-18 RX ADMIN — METHYLPREDNISOLONE SODIUM SUCCINATE 40 MG: 40 INJECTION INTRAMUSCULAR; INTRAVENOUS at 14:32

## 2025-07-18 RX ADMIN — APIXABAN 5 MG: 5 TABLET, FILM COATED ORAL at 07:54

## 2025-07-18 RX ADMIN — GUAIFENESIN 400 MG: 400 TABLET ORAL at 14:31

## 2025-07-18 RX ADMIN — ARFORMOTEROL TARTRATE 15 MCG: 15 SOLUTION RESPIRATORY (INHALATION) at 08:33

## 2025-07-18 RX ADMIN — AMIODARONE HYDROCHLORIDE 200 MG: 200 TABLET ORAL at 07:54

## 2025-07-18 RX ADMIN — IPRATROPIUM BROMIDE AND ALBUTEROL SULFATE 1 DOSE: .5; 2.5 SOLUTION RESPIRATORY (INHALATION) at 08:33

## 2025-07-18 RX ADMIN — DOXYCYCLINE 100 MG: 100 INJECTION, POWDER, LYOPHILIZED, FOR SOLUTION INTRAVENOUS at 14:45

## 2025-07-18 RX ADMIN — APIXABAN 5 MG: 5 TABLET, FILM COATED ORAL at 20:05

## 2025-07-18 RX ADMIN — IPRATROPIUM BROMIDE AND ALBUTEROL SULFATE 1 DOSE: .5; 2.5 SOLUTION RESPIRATORY (INHALATION) at 16:00

## 2025-07-18 RX ADMIN — BUDESONIDE 500 MCG: 0.5 SUSPENSION RESPIRATORY (INHALATION) at 08:33

## 2025-07-18 RX ADMIN — DOXYCYCLINE 100 MG: 100 INJECTION, POWDER, LYOPHILIZED, FOR SOLUTION INTRAVENOUS at 03:30

## 2025-07-18 ASSESSMENT — PAIN SCALES - GENERAL
PAINLEVEL_OUTOF10: 0

## 2025-07-18 NOTE — CONSULTS
Marcello Granado M.D.,[facility]  Blair Bermudez D.O., NUBIA., [facility]  Maria Luz Hamilton M.D.  Fiorella Montana M.D.   Daniel Doty D.O.  Mario Brewer M.D.       Patient:  Agueda Sierra 85 y.o. female MRN: [id number]           PULMONARY CONSULTATION    Reason for Consultation: Respiratory failure  Referring Physician:     Communication with the referring physician will be sent via the electronic medical record.    Chief Complaint: Shortness of breath and hypoxia    CODE STATUS: FULL     SUBJECTIVE:  HPI:  Agueda Sierra is a 85 y.o. with PMH of A-fib on Eliquis, breast cancer who came to the hospital for shortness of breath, and bilateral lower extremity weakness.  She reports history of fevers, cough productive of sputum, decreased appetite, nausea, increased sinus drainage and fatigue. She stated that her symptoms started a week ago and it got worse over the last 1-2 days prior to her arrival to the ED. She was also experiencing bilateral lower extremity weakness but thought that it was her neuropathy acting up.  On arrival to the hospital, her imaging with CTA pulm showed dense alveolar consolidation in left upper lobe as well as a small PLEF with left suprahilar lymphadenopathy.  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    Her history is also significant for breast cancer status post chemotherapy and radiation.  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    Patient was seen at bedside laying comfortably on bed.  Currently saturating at 97% on 2 L of oxygen on nasal cannula (not on oxygen at baseline).    Of note, the patient was seen by Dr. Hamilton while inpatient during a previous hospitalization but never followed up on outpatient basis for workup of

## 2025-07-18 NOTE — H&P
[id number] [place] [address]                           HISTORY & PHYSICAL      PATIENT NAME: NARESH JEAN           : 1940  MED REC NO: [id number]                        ROOM: 0433  ACCOUNT NO: [id number]                       ADMIT DATE: 2025  PROVIDER: Isidro England DO      CHIEF COMPLAINT:  Shortness of breath.    HISTORY:  Cough.  This is an 85-year-old female with known multiple medical problems, presented to the emergency room with shortness of breath and hypoxia per EMS and bilateral lower extremity weakness.  After further workup in the emergency room, the patient was found to have a pneumonic process and she is being admitted for further workup and evaluation.  We will also consult her attending pulmonologist for further assessment and input.    PAST MEDICAL HISTORY:  Significant for breast cancer, chronic atrial fibrillation, COPD.    SURGICAL HISTORY:  Includes knee surgery, mastectomy, also  EGD.    SOCIAL HISTORY:  She continues to smoke, exact quantity is difficult to determine.  She does not drink alcohol.    FAMILY HISTORY:  Not on file.  Noncontributory to this admission.    ALLERGIES:  PER THE PATIENT, LATEX AND TAPE.      REVIEW OF SYSTEMS:  Other than what is mentioned, noncontributory.    PHYSICAL EXAMINATION:  GENERAL:  An 85-year-old female who is alert, cooperative, well oriented, and appears to be fairly comfortable at the time of this examination.    HEAD:  Normocephalic, atraumatic.    EARS, EYES, NOSE, AND THROAT:  Grossly normal.    LUNGS:  Decreased breath sounds at the bases.  There is no wheeze, rale or rhonchi.    CARDIAC:  Irregular rate and rhythm.    ABDOMEN:  Soft.  Bowel sounds are physiologic.  No masses or tenderness .  MUSCULOSKELETAL:  Exam grossly intact.  Other than changes of degenerative joint disease, age related, there are no gross deformities.   SKIN:  Warm, no  suspicious lesions.  Turgor is good.    NEUROLOGIC:  Cranial nerves 2 through 12 grossly intact.  No evidence of motor or sensory deficit.    CLINICAL IMPRESSION:  Acute respiratory failure with hypoxia, pneumonia, history of tobacco abuse, chronic obstructive pulmonary disease, history of breast cancer.    PLAN:  Admit.  Pulmonology input.  Further recommendations pending workup and response.          THERESA BARAHONA DO      D:  07/18/2025 09:24:40     T:  07/18/2025 09:41:40     FAR/AQS  Job #:  [id number]     Doc#:  [id number]

## 2025-07-18 NOTE — PROGRESS NOTES
4 Eyes Skin Assessment     NAME:  Agueda Sierra  YOB: 1940  MEDICAL RECORD NUMBER:  [id number]    The patient is being assessed for  Admission    I agree that at least one RN has performed a thorough Head to Toe Skin Assessment on the patient. ALL assessment sites listed below have been assessed.      Areas assessed by both nurses:    Head, Face, Ears, Shoulders, Back, Chest, Arms, Elbows, Hands, Sacrum. Buttock, Coccyx, Ischium, Legs. Feet and Heels, and Under Medical Devices         Does the Patient have a Wound? No noted wound(s)       Nba Prevention initiated by RN: No  Wound Care Orders initiated by RN: No    For hospital-acquired stage 1 & 2 and ALL Stage 3,4, Unstageable, DTI, NWPT, and Complex wounds: place order “IP Wound Care/Ostomy Nurse Eval and Treat” by RN under : No    New Ostomies, if present place, Ostomy referral order under : No     Nurse 1 eSignature: Electronically signed by Jackeline Daily RN on 7/17/25 at 9:43 PM EDT    **SHARE this note so that the co-signing nurse can place an eSignature**    Nurse 2 eSignature: Electronically signed by Daniel Mai RN on 7/17/25 at 11:28 PM EDT

## 2025-07-18 NOTE — ACP (ADVANCE CARE PLANNING)
Advance Care Planning   Healthcare Decision Maker:    Primary Decision Maker: Lian Cisneros N - Grandchild - [phone]    Secondary Decision Maker: Stephenie Sheldon - Child - [phone]    Click here to complete Healthcare Decision Makers including selection of the Healthcare Decision Maker Relationship (ie \"Primary\").  Today we documented Decision Maker(s) consistent with ACP documents on file.

## 2025-07-18 NOTE — PROGRESS NOTES
Occupational Therapy  Date:7/18/2025  Patient Name: Agueda Sierra  Room: 0433/0433-A     Occupational Therapy (OT) order received, patient's medical record reviewed, and OT evaluation attempted this date; patient declined to participate in OOB activities, noting \"not today\". OT evaluation to be re-attempted at later date, as able/appropriate.    Felicita Wright, OTR/L  License Number: [id number]

## 2025-07-18 NOTE — PROGRESS NOTES
Subjective:    The patient is awake and alert.  No problems overnight.  O2 support   appears comfortable     Objective:    BP (!) 148/59   Pulse (!) 108   Temp 98.4 °F (36.9 °C) (Oral)   Resp 18   Ht 1.753 m (5' 9\")   Wt 81.6 kg (180 lb)   SpO2 91%   BMI 26.58 kg/m²     Heart:  RRR, no murmurs, gallops, or rubs.  Lungs:  Decreased BS at the bases    Abd: bowel sounds present, nontender, nondistended, no masses  Extrem:  No clubbing, cyanosis, or edema    Labs:  CBC:   Lab Results   Component Value Date/Time    WBC 10.7 07/18/2025 03:16 AM    RBC 3.85 07/18/2025 03:16 AM    HGB 10.3 07/18/2025 03:16 AM    HCT 33.4 07/18/2025 03:16 AM    MCV 86.8 07/18/2025 03:16 AM    MCH 26.8 07/18/2025 03:16 AM    MCHC 30.8 07/18/2025 03:16 AM    RDW 18.6 07/18/2025 03:16 AM     07/18/2025 03:16 AM    MPV 9.5 07/18/2025 03:16 AM     CMP:    Lab Results   Component Value Date/Time     07/18/2025 03:16 AM    K 3.7 07/18/2025 03:16 AM    K 4.5 04/03/2023 05:52 AM     07/18/2025 03:16 AM    CO2 28 07/18/2025 03:16 AM    BUN 15 07/18/2025 03:16 AM    CREATININE 0.9 07/18/2025 03:16 AM    GFRAA >60 08/18/2021 03:43 PM    LABGLOM 61 07/18/2025 03:16 AM    LABGLOM 51 04/07/2024 03:42 AM    GLUCOSE 103 07/18/2025 03:16 AM    CALCIUM 9.3 07/18/2025 03:16 AM    BILITOT <0.2 07/18/2025 03:16 AM    ALKPHOS 77 07/18/2025 03:16 AM    AST 27 07/18/2025 03:16 AM    ALT 12 07/18/2025 03:16 AM     PT/INR:    Lab Results   Component Value Date/Time    PROTIME 11.9 09/22/2023 01:25 AM    INR 1.1 09/22/2023 01:25 AM          Current Facility-Administered Medications:     cefTRIAXone (ROCEPHIN) 1,000 mg in sterile water 10 mL IV syringe, 1,000 mg, IntraVENous, Q24H, Starla Cheema APRN - CNP    doxycycline (VIBRAMYCIN) 100 mg in sodium chloride 0.9 % 100 mL IVPB, 100 mg, IntraVENous, Q12H, Starla Cheema APRN - CNP    arformoterol tartrate (BROVANA) nebulizer solution 15 mcg, 15 mcg, Nebulization, BID RT, Starla Cheema APRN

## 2025-07-19 LAB
L PNEUMO1 AG UR QL IA.RAPID: NEGATIVE
S PNEUM AG SPEC QL: NEGATIVE
SPECIMEN SOURCE: NORMAL

## 2025-07-19 PROCEDURE — 99233 SBSQ HOSP IP/OBS HIGH 50: CPT | Performed by: STUDENT IN AN ORGANIZED HEALTH CARE EDUCATION/TRAINING PROGRAM

## 2025-07-19 PROCEDURE — 6360000002 HC RX W HCPCS

## 2025-07-19 PROCEDURE — 2500000003 HC RX 250 WO HCPCS: Performed by: STUDENT IN AN ORGANIZED HEALTH CARE EDUCATION/TRAINING PROGRAM

## 2025-07-19 PROCEDURE — 2060000000 HC ICU INTERMEDIATE R&B

## 2025-07-19 PROCEDURE — 2580000003 HC RX 258

## 2025-07-19 PROCEDURE — 6370000000 HC RX 637 (ALT 250 FOR IP)

## 2025-07-19 PROCEDURE — 94640 AIRWAY INHALATION TREATMENT: CPT

## 2025-07-19 PROCEDURE — 94669 MECHANICAL CHEST WALL OSCILL: CPT

## 2025-07-19 PROCEDURE — 6370000000 HC RX 637 (ALT 250 FOR IP): Performed by: FAMILY MEDICINE

## 2025-07-19 PROCEDURE — 2500000003 HC RX 250 WO HCPCS

## 2025-07-19 RX ORDER — SODIUM CHLORIDE 0.9 % (FLUSH) 0.9 %
5-40 SYRINGE (ML) INJECTION PRN
Status: DISCONTINUED | OUTPATIENT
Start: 2025-07-19 | End: 2025-07-22 | Stop reason: HOSPADM

## 2025-07-19 RX ORDER — SODIUM CHLORIDE 0.9 % (FLUSH) 0.9 %
5-40 SYRINGE (ML) INJECTION 2 TIMES DAILY
Status: DISCONTINUED | OUTPATIENT
Start: 2025-07-19 | End: 2025-07-22 | Stop reason: HOSPADM

## 2025-07-19 RX ADMIN — METHYLPREDNISOLONE SODIUM SUCCINATE 40 MG: 40 INJECTION INTRAMUSCULAR; INTRAVENOUS at 13:19

## 2025-07-19 RX ADMIN — IPRATROPIUM BROMIDE AND ALBUTEROL SULFATE 1 DOSE: .5; 2.5 SOLUTION RESPIRATORY (INHALATION) at 12:08

## 2025-07-19 RX ADMIN — GUAIFENESIN 400 MG: 400 TABLET ORAL at 07:31

## 2025-07-19 RX ADMIN — AMIODARONE HYDROCHLORIDE 200 MG: 200 TABLET ORAL at 07:31

## 2025-07-19 RX ADMIN — BUDESONIDE 500 MCG: 0.5 SUSPENSION RESPIRATORY (INHALATION) at 08:05

## 2025-07-19 RX ADMIN — APIXABAN 5 MG: 5 TABLET, FILM COATED ORAL at 20:39

## 2025-07-19 RX ADMIN — GUAIFENESIN 400 MG: 400 TABLET ORAL at 13:19

## 2025-07-19 RX ADMIN — WATER 1000 MG: 1 INJECTION INTRAMUSCULAR; INTRAVENOUS; SUBCUTANEOUS at 15:09

## 2025-07-19 RX ADMIN — SODIUM CHLORIDE, PRESERVATIVE FREE 10 ML: 5 INJECTION INTRAVENOUS at 20:43

## 2025-07-19 RX ADMIN — DOXYCYCLINE 100 MG: 100 INJECTION, POWDER, LYOPHILIZED, FOR SOLUTION INTRAVENOUS at 02:44

## 2025-07-19 RX ADMIN — DOXYCYCLINE 100 MG: 100 INJECTION, POWDER, LYOPHILIZED, FOR SOLUTION INTRAVENOUS at 15:19

## 2025-07-19 RX ADMIN — METHYLPREDNISOLONE SODIUM SUCCINATE 40 MG: 40 INJECTION INTRAMUSCULAR; INTRAVENOUS at 02:41

## 2025-07-19 RX ADMIN — IPRATROPIUM BROMIDE AND ALBUTEROL SULFATE 1 DOSE: .5; 2.5 SOLUTION RESPIRATORY (INHALATION) at 08:05

## 2025-07-19 RX ADMIN — APIXABAN 5 MG: 5 TABLET, FILM COATED ORAL at 07:31

## 2025-07-19 RX ADMIN — GUAIFENESIN 400 MG: 400 TABLET ORAL at 20:39

## 2025-07-19 RX ADMIN — IPRATROPIUM BROMIDE AND ALBUTEROL SULFATE 1 DOSE: .5; 2.5 SOLUTION RESPIRATORY (INHALATION) at 15:21

## 2025-07-19 RX ADMIN — ARFORMOTEROL TARTRATE 15 MCG: 15 SOLUTION RESPIRATORY (INHALATION) at 08:05

## 2025-07-19 ASSESSMENT — PAIN SCALES - GENERAL
PAINLEVEL_OUTOF10: 0

## 2025-07-19 NOTE — PLAN OF CARE
Problem: ABCDS Injury Assessment  Goal: Absence of physical injury  7/19/2025 1438 by Amelia Baca, RN  Outcome: Progressing     Problem: Discharge Planning  Goal: Discharge to home or other facility with appropriate resources  7/19/2025 1438 by Amelia Baca, RN  Outcome: Progressing     Problem: Safety - Adult  Goal: Free from fall injury  7/19/2025 1438 by Amelia Baca, RN  Outcome: Progressing     Problem: Pain  Goal: Verbalizes/displays adequate comfort level or baseline comfort level  7/19/2025 1438 by Amelia Baca, RN  Outcome: Progressing

## 2025-07-19 NOTE — PROGRESS NOTES
Marcello Granado M.D.  Blair Bermudez D.O.  Noam Hamilton M.D.  Fiorella Montana M.D.   Daniel Doty D.O.  Mario Brewer M.D.           Daily Pulmonary Progress Note    Patient:  Agueda Sierra 85 y.o. female MRN: [id number]     Date of Service: 7/19/2025        Subjective      Patient was seen and examined.    Reports feeling better today.  Having productive cough.  Currently on 1 L nasal cannula.    Objective   Vitals: BP (!) 116/47   Pulse 77   Temp 98.2 °F (36.8 °C) (Oral)   Resp 18   Ht 1.753 m (5' 9\")   Wt 81.6 kg (180 lb)   SpO2 94%   BMI 26.58 kg/m²     I/O:    Intake/Output Summary (Last 24 hours) at 7/19/2025 1125  Last data filed at 7/19/2025 0122  Gross per 24 hour   Intake --   Output 900 ml   Net -900 ml       CURRENT MEDS :  Scheduled Meds:   cefTRIAXone (ROCEPHIN) IV  1,000 mg IntraVENous Q24H    doxycycline (VIBRAMYCIN) IV  100 mg IntraVENous Q12H    arformoterol tartrate  15 mcg Nebulization BID RT    budesonide  0.5 mg Nebulization BID RT    ipratropium 0.5 mg-albuterol 2.5 mg  1 Dose Inhalation Q4H WA RT    methylPREDNISolone  40 mg IntraVENous Q12H    guaiFENesin  400 mg Oral TID    apixaban  5 mg Oral BID    amiodarone  200 mg Oral Daily    digoxin  62.5 mcg Oral Once per day on Monday Wednesday Friday       Continuous Infusions:      PRN Meds:  albuterol, acetaminophen, guaiFENesin-dextromethorphan      Physical Exam:  Physical Exam  Constitutional:       Appearance: She is ill-appearing.   HENT:      Head: Normocephalic and atraumatic.   Eyes:      Conjunctiva/sclera: Conjunctivae normal.   Neck:      Trachea: No tracheal deviation.   Cardiovascular:      Rate and Rhythm: Normal rate and regular rhythm.      Heart sounds: Normal heart sounds.   Pulmonary:      Breath sounds: Examination of the left-upper field reveals rhonchi. Examination of the left-middle field reveals rhonchi. Wheezing and rhonchi present.   Abdominal:      General: Bowel sounds are normal.

## 2025-07-19 NOTE — PROGRESS NOTES
[facility] Hospitalist Progress Note    Admitting Date and Time: 7/17/2025 12:03 PM  Admit Dx: Acute respiratory failure with hypoxia (HCC) [J96.01]    Subjective:  Patient is being followed for Acute respiratory failure with hypoxia (HCC) [J96.01]       Patient was seen and examined at the bedside    ROS: denies fever, chills, cp, sob, n/v, HA unless stated above.      cefTRIAXone (ROCEPHIN) IV  1,000 mg IntraVENous Q24H    doxycycline (VIBRAMYCIN) IV  100 mg IntraVENous Q12H    arformoterol tartrate  15 mcg Nebulization BID RT    budesonide  0.5 mg Nebulization BID RT    ipratropium 0.5 mg-albuterol 2.5 mg  1 Dose Inhalation Q4H WA RT    methylPREDNISolone  40 mg IntraVENous Q12H    guaiFENesin  400 mg Oral TID    apixaban  5 mg Oral BID    amiodarone  200 mg Oral Daily    digoxin  62.5 mcg Oral Once per day on Monday Wednesday Friday     albuterol, 2.5 mg, Q6H PRN  acetaminophen, 650 mg, Q6H PRN  guaiFENesin-dextromethorphan, 10 mL, Q6H PRN         Objective:    BP (!) 103/58   Pulse 72   Temp 98.2 °F (36.8 °C) (Oral)   Resp 18   Ht 1.753 m (5' 9\")   Wt 81.6 kg (180 lb)   SpO2 95%   BMI 26.58 kg/m²     General Appearance: alert and oriented to person, place and time and in no acute distress  Skin: warm and dry  Head: normocephalic and atraumatic  Eyes: pupils equal, round, and reactive to light, extraocular eye movements intact, conjunctivae normal  Neck: neck supple and non tender without mass   Pulmonary/Chest: clear to auscultation bilaterally- no wheezes, rales or rhonchi, normal air movement, no respiratory distress  Cardiovascular: normal rate, normal S1 and S2 and no carotid bruits  Abdomen: soft, non-tender, non-distended, normal bowel sounds, no masses or organomegaly  Extremities: no cyanosis, no clubbing and no edema  Neurologic: no cranial nerve deficit and speech normal        Recent Labs     07/17/25  1259 07/18/25  0316    139   K 4.1 3.7    101   CO2 27 28   BUN 15 15

## 2025-07-19 NOTE — PLAN OF CARE
Problem: ABCDS Injury Assessment  Goal: Absence of physical injury  7/19/2025 0238 by Felicita Braxton, RN  Outcome: Progressing     Problem: Discharge Planning  Goal: Discharge to home or other facility with appropriate resources  7/19/2025 0238 by Felicita Braxton, RN  Outcome: Progressing     Problem: Safety - Adult  Goal: Free from fall injury  7/19/2025 0238 by Felicita Braxton, RN  Outcome: Progressing     Problem: Pain  Goal: Verbalizes/displays adequate comfort level or baseline comfort level  7/19/2025 0238 by Felicita Braxton, RN  Outcome: Progressing

## 2025-07-20 ENCOUNTER — APPOINTMENT (OUTPATIENT)
Dept: GENERAL RADIOLOGY | Age: 85
DRG: 193 | End: 2025-07-20
Payer: MEDICARE

## 2025-07-20 LAB
ANION GAP SERPL CALCULATED.3IONS-SCNC: 12 MMOL/L (ref 7–16)
BASOPHILS # BLD: 0.01 K/UL (ref 0–0.2)
BASOPHILS NFR BLD: 0 % (ref 0–2)
BUN SERPL-MCNC: 27 MG/DL (ref 8–23)
CALCIUM SERPL-MCNC: 10.3 MG/DL (ref 8.8–10.2)
CHLORIDE SERPL-SCNC: 99 MMOL/L (ref 98–107)
CO2 SERPL-SCNC: 28 MMOL/L (ref 22–29)
CREAT SERPL-MCNC: 0.9 MG/DL (ref 0.5–1)
EOSINOPHIL # BLD: 0 K/UL (ref 0.05–0.5)
EOSINOPHILS RELATIVE PERCENT: 0 % (ref 0–6)
ERYTHROCYTE [DISTWIDTH] IN BLOOD BY AUTOMATED COUNT: 18.6 % (ref 11.5–15)
GFR, ESTIMATED: 60 ML/MIN/1.73M2
GLUCOSE SERPL-MCNC: 107 MG/DL (ref 74–99)
HCT VFR BLD AUTO: 39.7 % (ref 34–48)
HGB BLD-MCNC: 11.8 G/DL (ref 11.5–15.5)
IMM GRANULOCYTES # BLD AUTO: 0.09 K/UL (ref 0–0.58)
IMM GRANULOCYTES NFR BLD: 1 % (ref 0–5)
LYMPHOCYTES NFR BLD: 0.45 K/UL (ref 1.5–4)
LYMPHOCYTES RELATIVE PERCENT: 3 % (ref 20–42)
MCH RBC QN AUTO: 26.6 PG (ref 26–35)
MCHC RBC AUTO-ENTMCNC: 29.7 G/DL (ref 32–34.5)
MCV RBC AUTO: 89.4 FL (ref 80–99.9)
MONOCYTES NFR BLD: 0.5 K/UL (ref 0.1–0.95)
MONOCYTES NFR BLD: 4 % (ref 2–12)
NEUTROPHILS NFR BLD: 92 % (ref 43–80)
NEUTS SEG NFR BLD: 12.31 K/UL (ref 1.8–7.3)
PLATELET # BLD AUTO: 521 K/UL (ref 130–450)
PMV BLD AUTO: 9.7 FL (ref 7–12)
POTASSIUM SERPL-SCNC: 4.9 MMOL/L (ref 3.5–5.1)
RBC # BLD AUTO: 4.44 M/UL (ref 3.5–5.5)
RBC # BLD: ABNORMAL 10*6/UL
SODIUM SERPL-SCNC: 139 MMOL/L (ref 136–145)
WBC OTHER # BLD: 13.4 K/UL (ref 4.5–11.5)

## 2025-07-20 PROCEDURE — 6360000002 HC RX W HCPCS

## 2025-07-20 PROCEDURE — 80048 BASIC METABOLIC PNL TOTAL CA: CPT

## 2025-07-20 PROCEDURE — 6370000000 HC RX 637 (ALT 250 FOR IP)

## 2025-07-20 PROCEDURE — 85025 COMPLETE CBC W/AUTO DIFF WBC: CPT

## 2025-07-20 PROCEDURE — 99233 SBSQ HOSP IP/OBS HIGH 50: CPT | Performed by: STUDENT IN AN ORGANIZED HEALTH CARE EDUCATION/TRAINING PROGRAM

## 2025-07-20 PROCEDURE — 71045 X-RAY EXAM CHEST 1 VIEW: CPT

## 2025-07-20 PROCEDURE — 2580000003 HC RX 258

## 2025-07-20 PROCEDURE — 2060000000 HC ICU INTERMEDIATE R&B

## 2025-07-20 PROCEDURE — 2500000003 HC RX 250 WO HCPCS

## 2025-07-20 PROCEDURE — 2500000003 HC RX 250 WO HCPCS: Performed by: STUDENT IN AN ORGANIZED HEALTH CARE EDUCATION/TRAINING PROGRAM

## 2025-07-20 PROCEDURE — 6370000000 HC RX 637 (ALT 250 FOR IP): Performed by: FAMILY MEDICINE

## 2025-07-20 RX ORDER — METHYLPREDNISOLONE SODIUM SUCCINATE 40 MG/ML
40 INJECTION INTRAMUSCULAR; INTRAVENOUS DAILY
Status: DISCONTINUED | OUTPATIENT
Start: 2025-07-21 | End: 2025-07-20

## 2025-07-20 RX ADMIN — GUAIFENESIN 400 MG: 400 TABLET ORAL at 07:39

## 2025-07-20 RX ADMIN — APIXABAN 5 MG: 5 TABLET, FILM COATED ORAL at 21:22

## 2025-07-20 RX ADMIN — GUAIFENESIN 400 MG: 400 TABLET ORAL at 21:22

## 2025-07-20 RX ADMIN — METHYLPREDNISOLONE SODIUM SUCCINATE 40 MG: 40 INJECTION INTRAMUSCULAR; INTRAVENOUS at 02:11

## 2025-07-20 RX ADMIN — WATER 1000 MG: 1 INJECTION INTRAMUSCULAR; INTRAVENOUS; SUBCUTANEOUS at 14:19

## 2025-07-20 RX ADMIN — DOXYCYCLINE 100 MG: 100 INJECTION, POWDER, LYOPHILIZED, FOR SOLUTION INTRAVENOUS at 02:15

## 2025-07-20 RX ADMIN — GUAIFENESIN 400 MG: 400 TABLET ORAL at 13:09

## 2025-07-20 RX ADMIN — APIXABAN 5 MG: 5 TABLET, FILM COATED ORAL at 07:39

## 2025-07-20 RX ADMIN — SODIUM CHLORIDE, PRESERVATIVE FREE 10 ML: 5 INJECTION INTRAVENOUS at 07:43

## 2025-07-20 RX ADMIN — ACETAMINOPHEN 650 MG: 325 TABLET ORAL at 03:31

## 2025-07-20 RX ADMIN — AMIODARONE HYDROCHLORIDE 200 MG: 200 TABLET ORAL at 07:39

## 2025-07-20 RX ADMIN — SODIUM CHLORIDE, PRESERVATIVE FREE 10 ML: 5 INJECTION INTRAVENOUS at 21:22

## 2025-07-20 RX ADMIN — DOXYCYCLINE 100 MG: 100 INJECTION, POWDER, LYOPHILIZED, FOR SOLUTION INTRAVENOUS at 14:18

## 2025-07-20 RX ADMIN — SODIUM CHLORIDE, PRESERVATIVE FREE 20 ML: 5 INJECTION INTRAVENOUS at 02:11

## 2025-07-20 ASSESSMENT — PAIN SCALES - GENERAL
PAINLEVEL_OUTOF10: 0
PAINLEVEL_OUTOF10: 9
PAINLEVEL_OUTOF10: 0
PAINLEVEL_OUTOF10: 0

## 2025-07-20 ASSESSMENT — PAIN DESCRIPTION - LOCATION: LOCATION: BACK;LEG

## 2025-07-20 NOTE — PLAN OF CARE
Problem: ABCDS Injury Assessment  Goal: Absence of physical injury  7/20/2025 1511 by Amelia Baca, RN  Outcome: Progressing     Problem: Discharge Planning  Goal: Discharge to home or other facility with appropriate resources  7/20/2025 1511 by Amelia Baca, RN  Outcome: Progressing     Problem: Safety - Adult  Goal: Free from fall injury  7/20/2025 1511 by Amelia Baca, RN  Outcome: Progressing     Problem: Pain  Goal: Verbalizes/displays adequate comfort level or baseline comfort level  7/20/2025 1511 by Amelia Baca, RN  Outcome: Progressing

## 2025-07-20 NOTE — PROGRESS NOTES
Marcello Granado M.D.  Blair Bermudez D.O.  Noam Hamilton M.D.  Fiorella Montana M.D.   Daniel Doty D.O.  Mario Brewer M.D.           Daily Pulmonary Progress Note    Patient:  Agueda Sierra 85 y.o. female MRN: [id number]     Date of Service: 7/20/2025        Subjective      Patient was seen and examined.    Reports feeling better today.  Chest x-ray improved.  Currently on room air.  Asking to come off steroids as concern for steroid psychosis.    Objective   Vitals: BP (!) 116/54   Pulse 74   Temp 97.7 °F (36.5 °C) (Oral)   Resp 18   Ht 1.753 m (5' 9\")   Wt 81.6 kg (180 lb)   SpO2 95%   BMI 26.58 kg/m²     I/O:    Intake/Output Summary (Last 24 hours) at 7/20/2025 1651  Last data filed at 7/20/2025 0916  Gross per 24 hour   Intake 180 ml   Output 200 ml   Net -20 ml       CURRENT MEDS :  Scheduled Meds:   sodium chloride flush  5-40 mL IntraVENous BID    cefTRIAXone (ROCEPHIN) IV  1,000 mg IntraVENous Q24H    doxycycline (VIBRAMYCIN) IV  100 mg IntraVENous Q12H    arformoterol tartrate  15 mcg Nebulization BID RT    budesonide  0.5 mg Nebulization BID RT    ipratropium 0.5 mg-albuterol 2.5 mg  1 Dose Inhalation Q4H WA RT    guaiFENesin  400 mg Oral TID    apixaban  5 mg Oral BID    amiodarone  200 mg Oral Daily    digoxin  62.5 mcg Oral Once per day on Monday Wednesday Friday       Continuous Infusions:      PRN Meds:  sodium chloride flush, albuterol, acetaminophen, guaiFENesin-dextromethorphan      Physical Exam:  Physical Exam  Constitutional:       Appearance: She is ill-appearing.   HENT:      Head: Normocephalic and atraumatic.   Eyes:      Conjunctiva/sclera: Conjunctivae normal.   Neck:      Trachea: No tracheal deviation.   Cardiovascular:      Rate and Rhythm: Normal rate and regular rhythm.      Heart sounds: Normal heart sounds.   Pulmonary:      Breath sounds: Examination of the left-upper field reveals rhonchi. Examination of the left-middle field reveals rhonchi. Rhonchi

## 2025-07-20 NOTE — PROGRESS NOTES
[facility] Hospitalist Progress Note    Admitting Date and Time: 7/17/2025 12:03 PM  Admit Dx: Acute respiratory failure with hypoxia (HCC) [J96.01]    Subjective:  Patient is being followed for Acute respiratory failure with hypoxia (HCC) [J96.01]       Patient was seen and examined at the bedside    ROS: denies fever, chills, cp, sob, n/v, HA unless stated above.      sodium chloride flush  5-40 mL IntraVENous BID    cefTRIAXone (ROCEPHIN) IV  1,000 mg IntraVENous Q24H    doxycycline (VIBRAMYCIN) IV  100 mg IntraVENous Q12H    arformoterol tartrate  15 mcg Nebulization BID RT    budesonide  0.5 mg Nebulization BID RT    ipratropium 0.5 mg-albuterol 2.5 mg  1 Dose Inhalation Q4H WA RT    methylPREDNISolone  40 mg IntraVENous Q12H    guaiFENesin  400 mg Oral TID    apixaban  5 mg Oral BID    amiodarone  200 mg Oral Daily    digoxin  62.5 mcg Oral Once per day on Monday Wednesday Friday     sodium chloride flush, 5-40 mL, PRN  albuterol, 2.5 mg, Q6H PRN  acetaminophen, 650 mg, Q6H PRN  guaiFENesin-dextromethorphan, 10 mL, Q6H PRN         Objective:    BP (!) 116/54   Pulse 74   Temp 97.7 °F (36.5 °C) (Oral)   Resp 18   Ht 1.753 m (5' 9\")   Wt 81.6 kg (180 lb)   SpO2 95%   BMI 26.58 kg/m²     General Appearance: alert and oriented to person, place and time and in no acute distress  Skin: warm and dry  Head: normocephalic and atraumatic  Eyes: pupils equal, round, and reactive to light, extraocular eye movements intact, conjunctivae normal  Neck: neck supple and non tender without mass   Pulmonary/Chest: clear to auscultation bilaterally- no wheezes, rales or rhonchi, normal air movement, no respiratory distress  Cardiovascular: normal rate, normal S1 and S2 and no carotid bruits  Abdomen: soft, non-tender, non-distended, normal bowel sounds, no masses or organomegaly  Extremities: no cyanosis, no clubbing and no edema  Neurologic: no cranial nerve deficit and speech normal        Recent Labs

## 2025-07-21 LAB
ANION GAP SERPL CALCULATED.3IONS-SCNC: 10 MMOL/L (ref 7–16)
BASOPHILS # BLD: 0.01 K/UL (ref 0–0.2)
BASOPHILS NFR BLD: 0 % (ref 0–2)
BUN SERPL-MCNC: 29 MG/DL (ref 8–23)
CALCIUM SERPL-MCNC: 9.7 MG/DL (ref 8.8–10.2)
CHLORIDE SERPL-SCNC: 103 MMOL/L (ref 98–107)
CO2 SERPL-SCNC: 26 MMOL/L (ref 22–29)
CREAT SERPL-MCNC: 0.9 MG/DL (ref 0.5–1)
EOSINOPHIL # BLD: 0.01 K/UL (ref 0.05–0.5)
EOSINOPHILS RELATIVE PERCENT: 0 % (ref 0–6)
ERYTHROCYTE [DISTWIDTH] IN BLOOD BY AUTOMATED COUNT: 18.6 % (ref 11.5–15)
GFR, ESTIMATED: 66 ML/MIN/1.73M2
GLUCOSE SERPL-MCNC: 96 MG/DL (ref 74–99)
HCT VFR BLD AUTO: 35.4 % (ref 34–48)
HGB BLD-MCNC: 11.1 G/DL (ref 11.5–15.5)
IMM GRANULOCYTES # BLD AUTO: 0.07 K/UL (ref 0–0.58)
IMM GRANULOCYTES NFR BLD: 1 % (ref 0–5)
LYMPHOCYTES NFR BLD: 0.65 K/UL (ref 1.5–4)
LYMPHOCYTES RELATIVE PERCENT: 8 % (ref 20–42)
MCH RBC QN AUTO: 27.4 PG (ref 26–35)
MCHC RBC AUTO-ENTMCNC: 31.4 G/DL (ref 32–34.5)
MCV RBC AUTO: 87.4 FL (ref 80–99.9)
MONOCYTES NFR BLD: 0.73 K/UL (ref 0.1–0.95)
MONOCYTES NFR BLD: 9 % (ref 2–12)
NEUTROPHILS NFR BLD: 82 % (ref 43–80)
NEUTS SEG NFR BLD: 6.87 K/UL (ref 1.8–7.3)
PLATELET # BLD AUTO: 443 K/UL (ref 130–450)
PMV BLD AUTO: 9.7 FL (ref 7–12)
POTASSIUM SERPL-SCNC: 4.5 MMOL/L (ref 3.5–5.1)
RBC # BLD AUTO: 4.05 M/UL (ref 3.5–5.5)
SODIUM SERPL-SCNC: 138 MMOL/L (ref 136–145)
WBC OTHER # BLD: 8.3 K/UL (ref 4.5–11.5)

## 2025-07-21 PROCEDURE — 97161 PT EVAL LOW COMPLEX 20 MIN: CPT

## 2025-07-21 PROCEDURE — 2500000003 HC RX 250 WO HCPCS

## 2025-07-21 PROCEDURE — 97165 OT EVAL LOW COMPLEX 30 MIN: CPT

## 2025-07-21 PROCEDURE — 80048 BASIC METABOLIC PNL TOTAL CA: CPT

## 2025-07-21 PROCEDURE — 6370000000 HC RX 637 (ALT 250 FOR IP)

## 2025-07-21 PROCEDURE — 6370000000 HC RX 637 (ALT 250 FOR IP): Performed by: FAMILY MEDICINE

## 2025-07-21 PROCEDURE — 2500000003 HC RX 250 WO HCPCS: Performed by: STUDENT IN AN ORGANIZED HEALTH CARE EDUCATION/TRAINING PROGRAM

## 2025-07-21 PROCEDURE — 94669 MECHANICAL CHEST WALL OSCILL: CPT

## 2025-07-21 PROCEDURE — 97535 SELF CARE MNGMENT TRAINING: CPT

## 2025-07-21 PROCEDURE — 6360000002 HC RX W HCPCS

## 2025-07-21 PROCEDURE — 2060000000 HC ICU INTERMEDIATE R&B

## 2025-07-21 PROCEDURE — 2580000003 HC RX 258

## 2025-07-21 PROCEDURE — 97530 THERAPEUTIC ACTIVITIES: CPT

## 2025-07-21 PROCEDURE — 94640 AIRWAY INHALATION TREATMENT: CPT

## 2025-07-21 PROCEDURE — 36415 COLL VENOUS BLD VENIPUNCTURE: CPT

## 2025-07-21 PROCEDURE — 85025 COMPLETE CBC W/AUTO DIFF WBC: CPT

## 2025-07-21 RX ORDER — DOXYCYCLINE 100 MG/1
100 CAPSULE ORAL EVERY 12 HOURS SCHEDULED
Status: DISCONTINUED | OUTPATIENT
Start: 2025-07-21 | End: 2025-07-22 | Stop reason: HOSPADM

## 2025-07-21 RX ADMIN — DOXYCYCLINE 100 MG: 100 INJECTION, POWDER, LYOPHILIZED, FOR SOLUTION INTRAVENOUS at 04:28

## 2025-07-21 RX ADMIN — DOXYCYCLINE HYCLATE 100 MG: 100 CAPSULE ORAL at 21:27

## 2025-07-21 RX ADMIN — AMIODARONE HYDROCHLORIDE 200 MG: 200 TABLET ORAL at 09:44

## 2025-07-21 RX ADMIN — DIGOXIN 62.5 MCG: 0.12 TABLET ORAL at 21:27

## 2025-07-21 RX ADMIN — ARFORMOTEROL TARTRATE 15 MCG: 15 SOLUTION RESPIRATORY (INHALATION) at 08:31

## 2025-07-21 RX ADMIN — IPRATROPIUM BROMIDE AND ALBUTEROL SULFATE 1 DOSE: .5; 2.5 SOLUTION RESPIRATORY (INHALATION) at 08:31

## 2025-07-21 RX ADMIN — SODIUM CHLORIDE, PRESERVATIVE FREE 10 ML: 5 INJECTION INTRAVENOUS at 04:28

## 2025-07-21 RX ADMIN — WATER 1000 MG: 1 INJECTION INTRAMUSCULAR; INTRAVENOUS; SUBCUTANEOUS at 14:28

## 2025-07-21 RX ADMIN — APIXABAN 5 MG: 5 TABLET, FILM COATED ORAL at 21:27

## 2025-07-21 RX ADMIN — GUAIFENESIN 400 MG: 400 TABLET ORAL at 21:27

## 2025-07-21 RX ADMIN — IPRATROPIUM BROMIDE AND ALBUTEROL SULFATE 1 DOSE: .5; 2.5 SOLUTION RESPIRATORY (INHALATION) at 19:32

## 2025-07-21 RX ADMIN — APIXABAN 5 MG: 5 TABLET, FILM COATED ORAL at 09:44

## 2025-07-21 RX ADMIN — SODIUM CHLORIDE, PRESERVATIVE FREE 10 ML: 5 INJECTION INTRAVENOUS at 21:28

## 2025-07-21 RX ADMIN — ARFORMOTEROL TARTRATE 15 MCG: 15 SOLUTION RESPIRATORY (INHALATION) at 19:32

## 2025-07-21 RX ADMIN — GUAIFENESIN 400 MG: 400 TABLET ORAL at 09:44

## 2025-07-21 RX ADMIN — GUAIFENESIN 400 MG: 400 TABLET ORAL at 14:29

## 2025-07-21 RX ADMIN — BUDESONIDE 500 MCG: 0.5 SUSPENSION RESPIRATORY (INHALATION) at 08:31

## 2025-07-21 RX ADMIN — BUDESONIDE 500 MCG: 0.5 SUSPENSION RESPIRATORY (INHALATION) at 19:32

## 2025-07-21 RX ADMIN — SODIUM CHLORIDE, PRESERVATIVE FREE 10 ML: 5 INJECTION INTRAVENOUS at 09:45

## 2025-07-21 NOTE — PROGRESS NOTES
IV to PO Conversion Policy     Notification of IV to PO conversion:    This patient's order for doxycycline IV has been changed to doxycycline PO as approved by the [facility] ([facility]) INTRAVENOUS TO ORAL Policy.    If the patient should become strict NPO while on this therapy, contact the prescriber for further orders.    nancy kirby RPH  7/21/2025  7:35 AM

## 2025-07-21 NOTE — PROGRESS NOTES
Subjective:    The patient is awake and alert.  No problems overnight.  On room air feels weak       Objective:    BP (!) 144/85   Pulse 70   Temp 97.9 °F (36.6 °C) (Oral)   Resp 18   Ht 1.753 m (5' 9\")   Wt 83.1 kg (183 lb 3.2 oz)   SpO2 92%   BMI 27.05 kg/m²     Heart:  Irreg  no murmurs, gallops, or rubs.  Lungs:  Scattered Coarse sounds  Abd: bowel sounds present, nontender, nondistended, no masses  Extrem:  No clubbing, cyanosis, or edema    Labs:  CBC:   Lab Results   Component Value Date/Time    WBC 8.3 07/21/2025 04:15 AM    RBC 4.05 07/21/2025 04:15 AM    HGB 11.1 07/21/2025 04:15 AM    HCT 35.4 07/21/2025 04:15 AM    MCV 87.4 07/21/2025 04:15 AM    MCH 27.4 07/21/2025 04:15 AM    MCHC 31.4 07/21/2025 04:15 AM    RDW 18.6 07/21/2025 04:15 AM     07/21/2025 04:15 AM    MPV 9.7 07/21/2025 04:15 AM     CMP:    Lab Results   Component Value Date/Time     07/21/2025 04:15 AM    K 4.5 07/21/2025 04:15 AM    K 4.5 04/03/2023 05:52 AM     07/21/2025 04:15 AM    CO2 26 07/21/2025 04:15 AM    BUN 29 07/21/2025 04:15 AM    CREATININE 0.9 07/21/2025 04:15 AM    GFRAA >60 08/18/2021 03:43 PM    LABGLOM 66 07/21/2025 04:15 AM    LABGLOM 51 04/07/2024 03:42 AM    GLUCOSE 96 07/21/2025 04:15 AM    CALCIUM 9.7 07/21/2025 04:15 AM    BILITOT <0.2 07/18/2025 03:16 AM    ALKPHOS 77 07/18/2025 03:16 AM    AST 27 07/18/2025 03:16 AM    ALT 12 07/18/2025 03:16 AM     PT/INR:    Lab Results   Component Value Date/Time    PROTIME 11.9 09/22/2023 01:25 AM    INR 1.1 09/22/2023 01:25 AM          Current Facility-Administered Medications:     sodium chloride flush 0.9 % injection 5-40 mL, 5-40 mL, IntraVENous, BID, Erica Isaacs T, DO, 10 mL at 07/20/25 2122    sodium chloride flush 0.9 % injection 5-40 mL, 5-40 mL, IntraVENous, PRN, Erica Isaacs T, DO, 10 mL at 07/21/25 0428    cefTRIAXone (ROCEPHIN) 1,000 mg in sterile water 10 mL IV syringe, 1,000 mg, IntraVENous, Q24H, Starla Cheema APRN - CNP, 1,000 mg at

## 2025-07-21 NOTE — PROGRESS NOTES
Marcello Granado M.D.,[facility]  Blair Bermudez D.O., F.DOMINGO.LUPE., [facility]  Noam Hamilton M.D.  Fiorella Montana M.D.   Daniel Doty D.O.  Mario Brewer M.D.         Daily Pulmonary Progress Note    Patient:  Agueda Sierra 85 y.o. female MRN: [id number]            Synopsis     We are following patient for respiratory failure    \"CC\" SOB, hypoxia    Code status: Full      Subjective      7/21/25:   Patient was seen and examined.  Sitting up in the bedside.  States that she is approved for discharge.  On room air without saturation issues.  Continues to deny all other respiratory issues.  Informs provider she lives in an apartment with stairs, asking who will assist her when she returns home.  Also questioning process for repeat CT at discharge.      Review of Systems:  Constitutional: fever, decreased appetite, fatigue  Skin: Denies pigmentation, dark lesions, and rashes   HEENT: Denies hearing loss, tinnitus, ear drainage, epistaxis, sore throat, and hoarseness.  Cardiovascular: Denies palpitations, chest pain, and chest pressure.  Respiratory: Increased cough, dyspnea with activity  Gastrointestinal: Denies nausea, vomiting, poor appetite, diarrhea, heartburn or reflux  Genitourinary: Denies dysuria, frequency, urgency or hematuria  Musculoskeletal: Bilateral lower extremity weakness  Neurological: Denies dizziness, vertigo, headache, and focal weakness  Psychological: Denies anxiety and depression  Endocrine: Denies heat intolerance and cold intolerance  Hematopoietic/Lymphatic: Denies bleeding problems and blood transfusions      24-hour events:  As above    Objective   OBJECTIVE:   BP (!) 146/62   Pulse 74   Temp 97.9 °F (36.6 °C) (Oral)   Resp 20   Ht 1.753 m (5' 9\")   Wt 83.1 kg (183 lb 3.2 oz)   SpO2 99%   BMI 27.05 kg/m²   SpO2 Readings from Last 1 Encounters:   07/21/25 99%        I/O:    Intake/Output Summary (Last 24 hours) at 7/21/2025 1319  Last data filed at 7/21/2025 0650  Gross per 24  No

## 2025-07-21 NOTE — PROGRESS NOTES
Occupational Therapy  OCCUPATIONAL THERAPY INITIAL EVALUATION  [facility]  [address], [place]    Date: 2025     Patient Name: Agueda (\"Parvin\") TAMI Sierra  MRN: [id number]  : 1940  Room: [address] [place] [address]    Evaluating OT: Felicita Wright, OTR/L - [id number]    Referring Provider: Isidro England DO  Specific Provider Orders/Date: \"OT eval and treat\" - 2025    Diagnosis: Acute respiratory failure with hypoxia (HCC) [J96.01]      Pertinent Medical History: breast cancer, mastectomy, a-fib     Precautions: fall risk    Assessment of Current Deficits:   [x] Functional mobility   [x] ADLs  [x] Strength               [x] Cognition   [x] Functional transfers   [x] IADLs         [x] Safety Awareness   [x] Endurance   [] Fine Motor Coordination  [x] Balance      [] Vision/Perception   [x] Sensation    [] Gross Motor Coordination [] ROM          [] Delirium                  [] Motor Control     OT PLAN OF CARE   OT POC is based on physician orders, patient diagnosis, and results of clinical assessment.  Frequency/Duration 2-5 days/week for 2-4 weeks PRN   Specific OT Treatment Interventions to Include:   * Instruction/training on adapted ADL techniques and AE recommendations to increase functional independence within precautions       * Training on energy conservation strategies, correct breathing pattern and techniques to improve independence/tolerance for self-care routine  * Functional transfer/mobility training/DME recommendations for increased independence, safety, and fall prevention  * Patient/Family education to increase follow through with safety techniques and functional independence  * Recommendation of environmental modifications for increased safety with functional transfers/mobility and ADLs  * Therapeutic exercise to improve motor endurance, ROM, and functional strength for ADLs/functional transfers  * Therapeutic activities to

## 2025-07-21 NOTE — PROGRESS NOTES
Physical Therapy  Facility/Department: [address]  Physical Therapy Initial Assessment    Name: Agueda Sierra  : 1940  MRN: [id number]  Date of Service: 2025        Patient Diagnosis(es): The encounter diagnosis was Acute respiratory failure with hypoxia (HCC).  Past Medical History:  has a past medical history of Breast cancer (HCC), Chronic a-fib (HCC), and Heart palpitations.  Past Surgical History:  has a past surgical history that includes knee surgery; Mastectomy (Left); Upper gastrointestinal endoscopy (N/A, 2023); and Colonoscopy (N/A, 2023).      Evaluating Therapist: Maren Cruz PT    Equipment recommendation: wheeled walker    Room #:  0433/0433-A  Diagnosis:  Acute respiratory failure with hypoxia (HCC) [J96.01]  PMHx/PSHx:  Breast CA, Afib  Precautions:  falls, alarm      Social:  Pt lives alone in a 1 floor apartment with 5 steps to enter.  Prior to admission independent with cane.  States has assistance with transportation, cleaning and laundry.      Initial Evaluation  Date: 25 Treatment      Short Term/ Long Term   Goals   Was pt agreeable to Eval/treatment? yes     Does pt have pain? No c/o pain     Bed Mobility  Rolling: SBA  Supine to sit: SBA  Sit to supine:NT  Scooting: SBA  independent   Transfers Sit to stand: SBA  Stand to sit: SBA  Stand pivot: SBA  independent   Ambulation    20 feet and 50 feet with wheeled walker with SBA  100 feet with ww independent   Stair Negotiation  Ascended and descended  2 curb steps with ww CGA   5 steps with 1 rail with and cane SBA   LE strength     3+/5    4/5   balance      Fair+     AM-PAC Raw score               18/24         Pt is alert and Oriented   LE ROM: WFL  Sensation: decreased B LEs  Endurance: fair+     ASSESSMENT:    Pt displays functional ability as noted in the objective portion of this evaluation.      Patient education  Pt educated on walker safety    Patient response to education:   Pt verbalized

## 2025-07-21 NOTE — PROGRESS NOTES
Spiritual Health History and Assessment/Progress Note  [facility]     Encounter, Rituals, Rites and Sacraments,  ,  ,      Name: Agueda Sierra MRN: [id number]    Age: 85 y.o.     Sex: female   Language: English   Hinduism: Evangelical   Acute respiratory failure with hypoxia (HCC)     Date: 7/21/2025                           Spiritual Assessment began in [address] INTERMEDIATE 1        Referral/Consult From: Rounding   Encounter Overview/Reason:  Encounter, Rituals, Rites and Sacraments  Service Provided For: Patient    Mariela, Belief, Meaning:   Patient is connected with a mariela tradition or spiritual practice  Family/Friends are connected with a mariela tradition or spiritual practice      Importance and Influence:  Patient has no beliefs influential to healthcare decision-making identified during this visit  Family/Friends No family/friends present    Community:  Patient feels well-supported. Support system includes: Children and Extended family  Family/Friends No family/friends present    Assessment and Plan of Care:     Patient Interventions include: Facilitated expression of thoughts and feelings, Affirmed coping skills/support systems, and Provided sacramental/Confucianism ritual  Family/Friends Interventions include: No family/friends present    Patient Plan of Care: Spiritual Care available upon further referral  Family/Friends Plan of Care: Spiritual Care available upon further referral    Electronically signed by Chaplain Juan Miguel on 7/21/2025 at 4:06 PM

## 2025-07-21 NOTE — PLAN OF CARE
Problem: ABCDS Injury Assessment  Goal: Absence of physical injury  7/21/2025 0814 by Shaila Webber RN  Outcome: Progressing  7/21/2025 0623 by Dorcas Ladd RN  Outcome: Progressing     Problem: Discharge Planning  Goal: Discharge to home or other facility with appropriate resources  7/21/2025 0814 by Shaila Webber RN  Outcome: Progressing  7/21/2025 0623 by Dorcas Ladd RN  Outcome: Progressing     Problem: Safety - Adult  Goal: Free from fall injury  7/21/2025 0814 by Shaila Webber RN  Outcome: Progressing  7/21/2025 0623 by Dorcas Ladd RN  Outcome: Progressing     Problem: Pain  Goal: Verbalizes/displays adequate comfort level or baseline comfort level  7/21/2025 0814 by Shaila Webber RN  Outcome: Progressing  7/21/2025 0623 by Dorcas Ladd RN  Outcome: Progressing

## 2025-07-22 VITALS
HEART RATE: 83 BPM | TEMPERATURE: 98.4 F | RESPIRATION RATE: 16 BRPM | OXYGEN SATURATION: 99 % | WEIGHT: 183.2 LBS | DIASTOLIC BLOOD PRESSURE: 64 MMHG | HEIGHT: 69 IN | SYSTOLIC BLOOD PRESSURE: 148 MMHG | BODY MASS INDEX: 27.13 KG/M2

## 2025-07-22 LAB
ANION GAP SERPL CALCULATED.3IONS-SCNC: 10 MMOL/L (ref 7–16)
BASOPHILS # BLD: 0 K/UL (ref 0–0.2)
BASOPHILS NFR BLD: 0 % (ref 0–2)
BUN SERPL-MCNC: 30 MG/DL (ref 8–23)
CALCIUM SERPL-MCNC: 9.4 MG/DL (ref 8.8–10.2)
CHLORIDE SERPL-SCNC: 103 MMOL/L (ref 98–107)
CO2 SERPL-SCNC: 27 MMOL/L (ref 22–29)
CREAT SERPL-MCNC: 1.2 MG/DL (ref 0.5–1)
EOSINOPHIL # BLD: 0.06 K/UL (ref 0.05–0.5)
EOSINOPHILS RELATIVE PERCENT: 1 % (ref 0–6)
ERYTHROCYTE [DISTWIDTH] IN BLOOD BY AUTOMATED COUNT: 18.4 % (ref 11.5–15)
GFR, ESTIMATED: 46 ML/MIN/1.73M2
GLUCOSE SERPL-MCNC: 115 MG/DL (ref 74–99)
HCT VFR BLD AUTO: 36 % (ref 34–48)
HGB BLD-MCNC: 10.5 G/DL (ref 11.5–15.5)
IMM GRANULOCYTES # BLD AUTO: 0.04 K/UL (ref 0–0.58)
IMM GRANULOCYTES NFR BLD: 1 % (ref 0–5)
LYMPHOCYTES NFR BLD: 0.82 K/UL (ref 1.5–4)
LYMPHOCYTES RELATIVE PERCENT: 12 % (ref 20–42)
MCH RBC QN AUTO: 26.3 PG (ref 26–35)
MCHC RBC AUTO-ENTMCNC: 29.2 G/DL (ref 32–34.5)
MCV RBC AUTO: 90.2 FL (ref 80–99.9)
MICROORGANISM SPEC CULT: NORMAL
MICROORGANISM SPEC CULT: NORMAL
MONOCYTES NFR BLD: 0.71 K/UL (ref 0.1–0.95)
MONOCYTES NFR BLD: 11 % (ref 2–12)
NEUTROPHILS NFR BLD: 76 % (ref 43–80)
NEUTS SEG NFR BLD: 5.02 K/UL (ref 1.8–7.3)
PLATELET # BLD AUTO: 383 K/UL (ref 130–450)
PMV BLD AUTO: 9.5 FL (ref 7–12)
POTASSIUM SERPL-SCNC: 4.4 MMOL/L (ref 3.5–5.1)
RBC # BLD AUTO: 3.99 M/UL (ref 3.5–5.5)
SERVICE CMNT-IMP: NORMAL
SERVICE CMNT-IMP: NORMAL
SODIUM SERPL-SCNC: 141 MMOL/L (ref 136–145)
SPECIMEN DESCRIPTION: NORMAL
SPECIMEN DESCRIPTION: NORMAL
WBC OTHER # BLD: 6.7 K/UL (ref 4.5–11.5)

## 2025-07-22 PROCEDURE — 6370000000 HC RX 637 (ALT 250 FOR IP)

## 2025-07-22 PROCEDURE — 2500000003 HC RX 250 WO HCPCS: Performed by: STUDENT IN AN ORGANIZED HEALTH CARE EDUCATION/TRAINING PROGRAM

## 2025-07-22 PROCEDURE — 6370000000 HC RX 637 (ALT 250 FOR IP): Performed by: FAMILY MEDICINE

## 2025-07-22 PROCEDURE — 85025 COMPLETE CBC W/AUTO DIFF WBC: CPT

## 2025-07-22 PROCEDURE — 80048 BASIC METABOLIC PNL TOTAL CA: CPT

## 2025-07-22 RX ORDER — DOXYCYCLINE 100 MG/1
100 CAPSULE ORAL EVERY 12 HOURS SCHEDULED
Qty: 7 CAPSULE | Refills: 0 | Status: SHIPPED | OUTPATIENT
Start: 2025-07-22 | End: 2025-07-26

## 2025-07-22 RX ORDER — NICOTINE 21 MG/24HR
1 PATCH, TRANSDERMAL 24 HOURS TRANSDERMAL DAILY
Status: DISCONTINUED | OUTPATIENT
Start: 2025-07-22 | End: 2025-07-22 | Stop reason: HOSPADM

## 2025-07-22 RX ORDER — CEFDINIR 300 MG/1
300 CAPSULE ORAL EVERY 12 HOURS SCHEDULED
Status: DISCONTINUED | OUTPATIENT
Start: 2025-07-22 | End: 2025-07-22 | Stop reason: HOSPADM

## 2025-07-22 RX ORDER — NICOTINE 21 MG/24HR
1 PATCH, TRANSDERMAL 24 HOURS TRANSDERMAL DAILY
Qty: 30 PATCH | Refills: 3 | Status: SHIPPED | OUTPATIENT
Start: 2025-07-22

## 2025-07-22 RX ORDER — CEFDINIR 300 MG/1
300 CAPSULE ORAL EVERY 12 HOURS SCHEDULED
Qty: 10 CAPSULE | Refills: 0 | Status: SHIPPED | OUTPATIENT
Start: 2025-07-22 | End: 2025-07-27

## 2025-07-22 RX ADMIN — SODIUM CHLORIDE, PRESERVATIVE FREE 10 ML: 5 INJECTION INTRAVENOUS at 08:13

## 2025-07-22 RX ADMIN — GUAIFENESIN 400 MG: 400 TABLET ORAL at 08:10

## 2025-07-22 RX ADMIN — CEFDINIR 300 MG: 300 CAPSULE ORAL at 08:10

## 2025-07-22 RX ADMIN — DOXYCYCLINE HYCLATE 100 MG: 100 CAPSULE ORAL at 08:10

## 2025-07-22 RX ADMIN — APIXABAN 5 MG: 5 TABLET, FILM COATED ORAL at 08:10

## 2025-07-22 RX ADMIN — AMIODARONE HYDROCHLORIDE 200 MG: 200 TABLET ORAL at 08:10

## 2025-07-22 NOTE — PLAN OF CARE
Problem: ABCDS Injury Assessment  Goal: Absence of physical injury  Outcome: Progressing     Problem: Discharge Planning  Goal: Discharge to home or other facility with appropriate resources  Outcome: Progressing  Flowsheets (Taken 7/21/2025 2005)  Discharge to home or other facility with appropriate resources: Identify barriers to discharge with patient and caregiver     Problem: Safety - Adult  Goal: Free from fall injury  Outcome: Progressing     Problem: Pain  Goal: Verbalizes/displays adequate comfort level or baseline comfort level  Outcome: Progressing

## 2025-07-22 NOTE — PROGRESS NOTES
Marcello Granado M.D.,[facility]  Blair Bermudez D.O., F.A.C.O.I., [facility]  Noam Hamilton M.D.  Fiorella Montana M.D.   Daniel Doty D.O.  Mario Brewer M.D.         Daily Pulmonary Progress Note    Patient:  Agueda Sierra 85 y.o. female MRN: [id number]            Synopsis     We are following patient for respiratory failure    \"CC\" SOB, hypoxia    Code status: Full      Subjective      7/21/25:   Patient was seen and examined.  Sitting up in the bedside.  States that she is approved for discharge.  On room air without saturation issues.  Continues to deny all other respiratory issues.  Informs provider she lives in an apartment with stairs, asking who will assist her when she returns home.  Also questioning process for repeat CT at discharge.    7/22/25: Examined at the bedside.  Reports she has been cleared for discharge by PCP.  PT delfino yesterday appreciated for AM-PAC score of 18/24.  Will have home health care moving forward.  Denies respiratory related complaints when questioned      Review of Systems:  Constitutional: fever, decreased appetite, fatigue  Skin: Denies pigmentation, dark lesions, and rashes   HEENT: Denies hearing loss, tinnitus, ear drainage, epistaxis, sore throat, and hoarseness.  Cardiovascular: Denies palpitations, chest pain, and chest pressure.  Respiratory: Increased cough, dyspnea with activity  Gastrointestinal: Denies nausea, vomiting, poor appetite, diarrhea, heartburn or reflux  Genitourinary: Denies dysuria, frequency, urgency or hematuria  Musculoskeletal: Bilateral lower extremity weakness  Neurological: Denies dizziness, vertigo, headache, and focal weakness  Psychological: Denies anxiety and depression  Endocrine: Denies heat intolerance and cold intolerance  Hematopoietic/Lymphatic: Denies bleeding problems and blood transfusions      24-hour events:  As above    Objective   OBJECTIVE:   BP (!) 148/64   Pulse 83   Temp 98.4 °F (36.9 °C) (Oral)   Resp 16   Ht 1.753  bilateral pleural  effusions.        Echo: None on file      Labs:  Lab Results   Component Value Date/Time    WBC 6.7 07/22/2025 04:00 AM    RBC 3.99 07/22/2025 04:00 AM    HGB 10.5 07/22/2025 04:00 AM    HCT 36.0 07/22/2025 04:00 AM    MCV 90.2 07/22/2025 04:00 AM    MCH 26.3 07/22/2025 04:00 AM    MCHC 29.2 07/22/2025 04:00 AM    RDW 18.4 07/22/2025 04:00 AM     07/22/2025 04:00 AM    MPV 9.5 07/22/2025 04:00 AM     Lab Results   Component Value Date/Time     07/22/2025 04:00 AM    K 4.4 07/22/2025 04:00 AM    K 4.5 04/03/2023 05:52 AM     07/22/2025 04:00 AM    CO2 27 07/22/2025 04:00 AM    BUN 30 07/22/2025 04:00 AM    CREATININE 1.2 07/22/2025 04:00 AM    CALCIUM 9.4 07/22/2025 04:00 AM    GFRAA >60 08/18/2021 03:43 PM    LABGLOM 46 07/22/2025 04:00 AM    LABGLOM 51 04/07/2024 03:42 AM     Lab Results   Component Value Date/Time    PROTIME 11.9 09/22/2023 01:25 AM    INR 1.1 09/22/2023 01:25 AM     No results for input(s): \"PROBNP\" in the last 72 hours.  No results for input(s): \"TROPONINI\" in the last 72 hours.  No results for input(s): \"PROCAL\" in the last 72 hours.  This SmartLink has not been configured with any valid records.       Micro:  No results for input(s): \"CULTRESP\" in the last 72 hours.  No results for input(s): \"LABGRAM\" in the last 72 hours.  No results for input(s): \"LEGUR\" in the last 72 hours.  No results for input(s): \"STREPNEUMAGU\" in the last 72 hours.  No results for input(s): \"LP1UAG\" in the last 72 hours.     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

## 2025-07-22 NOTE — PROGRESS NOTES
Subjective:    The patient is awake and alert.  No problems overnight.  Feeling better    Objective:    /82   Pulse 74   Temp 97.9 °F (36.6 °C) (Oral)   Resp 18   Ht 1.753 m (5' 9\")   Wt 83.1 kg (183 lb 3.2 oz)   SpO2 94%   BMI 27.05 kg/m²     Heart:  RRR, no murmurs, gallops, or rubs.  Lungs:  Decreased BS at the bases few coarse sounds   Abd: bowel sounds present, nontender, nondistended, no masses  Extrem:  No clubbing, cyanosis, or edema    Labs:  CBC:   Lab Results   Component Value Date/Time    WBC 6.7 07/22/2025 04:00 AM    RBC 3.99 07/22/2025 04:00 AM    HGB 10.5 07/22/2025 04:00 AM    HCT 36.0 07/22/2025 04:00 AM    MCV 90.2 07/22/2025 04:00 AM    MCH 26.3 07/22/2025 04:00 AM    MCHC 29.2 07/22/2025 04:00 AM    RDW 18.4 07/22/2025 04:00 AM     07/22/2025 04:00 AM    MPV 9.5 07/22/2025 04:00 AM     CMP:    Lab Results   Component Value Date/Time     07/22/2025 04:00 AM    K 4.4 07/22/2025 04:00 AM    K 4.5 04/03/2023 05:52 AM     07/22/2025 04:00 AM    CO2 27 07/22/2025 04:00 AM    BUN 30 07/22/2025 04:00 AM    CREATININE 1.2 07/22/2025 04:00 AM    GFRAA >60 08/18/2021 03:43 PM    LABGLOM 46 07/22/2025 04:00 AM    LABGLOM 51 04/07/2024 03:42 AM    GLUCOSE 115 07/22/2025 04:00 AM    CALCIUM 9.4 07/22/2025 04:00 AM    BILITOT <0.2 07/18/2025 03:16 AM    ALKPHOS 77 07/18/2025 03:16 AM    AST 27 07/18/2025 03:16 AM    ALT 12 07/18/2025 03:16 AM     PT/INR:    Lab Results   Component Value Date/Time    PROTIME 11.9 09/22/2023 01:25 AM    INR 1.1 09/22/2023 01:25 AM          Current Facility-Administered Medications:     nicotine (NICODERM CQ) 21 MG/24HR 1 patch, 1 patch, TransDERmal, Daily, Isidro England,     cefdinir (OMNICEF) capsule 300 mg, 300 mg, Oral, 2 times per day, Isidro England DO    doxycycline hyclate (VIBRAMYCIN) capsule 100 mg, 100 mg, Oral, 2 times per day, Starla Cheema, APRN - CNP, 100 mg at 07/21/25 2127    sodium chloride flush 0.9 % injection 5-40 mL, 5-40

## 2025-07-22 NOTE — CARE COORDINATION
Social Work/Discharge Planning:  Discharge order noted.  Patient has order for home health care, bedside commode and a walker.  Notified [facility] and [facility] of discharge and orders.  Electronically signed by ROBERTO Webb on 7/22/2025 at 10:02 AM    
Social Work/Discharge Planning:  Met with patient and completed initial assessment.  Explained Social Work role and discussed transition of care/discharge planning.  Patient lives alone in a second floor apartment with five steps to enter.  PTA she uses a cane.  She has a shower chair.  She is requesting a walker at discharge.  She has a home health care history with [facility] and will use again if needed at discharge.  She has a skilled nursing facility history at [facility].  She is currently requiring two liters oxygen and RN to wean as tolerated.  Therapy to evaluate.  Will continue to follow and assist with discharge planning.  Electronically signed by ROBERTO Webb on 7/18/2025 at 3:49 PM    
Social Work/Discharge Planning:  Received notification patient requesting a walker and bedside commode.  Met with patient and confirmed she prefers to use [facility] for walker and bedside commode.  She is requesting home health care through [facility].  Referral made to [facility] and they can accept.  Referral made to [facility] for walker and bedside commode.  Patient will need an order for home health care and an order for a walker and bedside commode.  Will continue to follow.  Electronically signed by ROBERTO Webb on 7/21/2025 at 2:38 PM    Addendum:  [facility] is requesting order for walker and bedside commode and Physician note indicating need for these items.  Will continue to follow.  Electronically signed by ROBERTO Webb on 7/21/2025 at 3:48 PM    
normal performance

## 2025-07-22 NOTE — PLAN OF CARE
Problem: ABCDS Injury Assessment  Goal: Absence of physical injury  7/22/2025 0920 by Susan Villarreal RN  Outcome: Adequate for Discharge  7/22/2025 0848 by Dorcas Ladd RN  Outcome: Progressing     Problem: Discharge Planning  Goal: Discharge to home or other facility with appropriate resources  7/22/2025 0920 by Susan Villarreal RN  Outcome: Adequate for Discharge  7/22/2025 0848 by Dorcas Ladd RN  Outcome: Progressing  Flowsheets (Taken 7/21/2025 2005)  Discharge to home or other facility with appropriate resources: Identify barriers to discharge with patient and caregiver     Problem: Safety - Adult  Goal: Free from fall injury  7/22/2025 0920 by Susan Villarreal RN  Outcome: Adequate for Discharge  7/22/2025 0848 by Dorcas Ladd RN  Outcome: Progressing     Problem: Pain  Goal: Verbalizes/displays adequate comfort level or baseline comfort level  7/22/2025 0920 by Susan Villarreal RN  Outcome: Adequate for Discharge  7/22/2025 0848 by Dorcas Ladd RN  Outcome: Progressing

## 2025-07-22 NOTE — PROGRESS NOTES
Please ambulate patient for a MINIMUM of 6 minutes to ensure accuracy of test.        Pulse ox was 94 % on room air at rest.  Ambulated patient on room air.     Oxygen saturation was 90 % on room air while ambulating. (lowest saturation reached)     Recovery pulse ox was 92% on room air.

## 2025-07-23 ENCOUNTER — CARE COORDINATION (OUTPATIENT)
Dept: CARE COORDINATION | Age: 85
End: 2025-07-23

## 2025-07-23 NOTE — CARE COORDINATION
Care Transitions Note    Initial Call - Call within 2 business days of discharge: Yes    Attempted to reach patient for transitions of care follow up. Unable to reach patient.    Outreach Attempts:   Pt answers phone saying [facility] nurse is currently at home doing SOC visit and request call back later.     Patient: Agueda Sierra    Patient : 1940   MRN: [id number]    Reason for Admission: Acute respiratory failure with hypoxia/PNA  Discharge Date: 25  RURS: Readmission Risk Score: 12.3    Last Discharge Facility       Date Complaint Diagnosis Description Type Department Provider    25 Shortness of Breath; Extremity Weakness Acute respiratory failure with hypoxia (HCC) ED to Hosp-Admission (Discharged) (ADMITTED) Isidro Huertas, DO; Doreen Ricardo...            Was this an external facility discharge? No      NATALY Apple

## 2025-07-23 NOTE — DISCHARGE SUMMARY
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    BRIEF HISTORY:  This is one of multiple admissions for this 85-year-old female who presented to the emergency room.  She has a history of multiple medical problems, presented to the emergency room with shortness of breath and hypoxia per EMS associated with bilateral lower extremity weakness.  After further workup in the emergency room, the patient was found to have pneumonia, community acquired along with acute hypoxic respiratory failure.  She was admitted to telemetry for further workup and evaluation.  The patient has a long history of atrial fibrillation and breast cancer as stated above.  She was started on broad-spectrum antibiotics, tapering steroid treatment, frequent aerosol treatments.  Due to her generalized weakness and crisis of ambulation, PT/OT was asked to see the patient and their recommendations.  It was felt that the patient could do well in her home environment with the help of home healthcare, PT/OT.  She did improve with the above-mentioned treatment through the course of the hospital stay.  I would advise to please review sequential laboratory, imaging studies as well.  Pulmonology consult by Dr. Montana for her assessment and

## 2025-07-23 NOTE — CARE COORDINATION
Care Transitions Note    Initial Call - Call within 2 business days of discharge: Yes    Patient Current Location:  Home: [address] [place] [address], [place] [id number]    Care Transition Nurse contacted the patient by telephone to perform post hospital discharge assessment, verified name and  as identifiers.  Provided introduction to self, and explanation of the Care Transition Nurse role.    Patient: Agueda Sierra    Patient : 1940   MRN: [id number]    Reason for Admission: Acute respiratory failure with hypoxia/PNA  Discharge Date: 25  RURS: Readmission Risk Score: 12.3      Last Discharge Facility       Date Complaint Diagnosis Description Type Department Provider    25 Shortness of Breath; Extremity Weakness Acute respiratory failure with hypoxia (HCC) ED to Hosp-Admission (Discharged) (ADMITTED) Isidro Huertas DO; Doreen Ricardo...            Was this an external facility discharge? No    Additional needs identified to be addressed with provider   No needs identified             Method of communication with provider: none.    Patients top risk factors for readmission: functional physical ability, lack of knowledge about disease, level of motivation, medical condition-COPD, AFib on ACT, polypharmacy, and utilization of services    Interventions to address risk factors:   Education: Encouraged to quit smoking and obtain Nicoderm patch but patient states on fixed income and only getting social security an has to pay her rent and not able to afford OOP for patch as insurance does not cover it. Discussed COPD/PNA zone tools and knowing when to seek medical attention.   Review of patient management of conditions/medications: Advised to take ATB as directed until finished. Discussed bleeding precautions associated with blood thinner therapy (Eliquis/Afib) and knowing when to call doctor or seek immediate medical attention.    Care Summary Note: Spoke with patient today 25 for

## 2025-07-29 NOTE — PROGRESS NOTES
Physician Progress Note      PATIENT:               NARESH JEAN  [facility] #:                  [id number]  :                       1940  ADMIT DATE:       2025 12:03 PM  DISCH DATE:        2025 2:28 PM  RESPONDING  PROVIDER #:        Isidro England DO          QUERY TEXT:    Acute respiratory failure is documented in the medical record by Dr. England on   . Please provide additional clinical indicators supportive of your   documentation. Or please document if the diagnosis of acute respiratory   failure has been ruled out after study.    The clinical indicators include:  - \"shortness of breath and hypoxia per EMS...found to have pneumonia,   community acquired along with acute hypoxic respiratory failure\" (Per Dr. England   in DCS on )  - \"Shortness of Breath...Moderate...Pulmonary effort is normal\" (Per Dr. Ricardo in ED provider note on )  - \"dyspnea at rest...lying in bed comfortably without any distress.  Breathing   is not labored\" (Per Dr. Colin in pulmonology CN on )  - SpO2 of 83% on RA with RR of 18 on arrival to ED on  and placed on 2L NC   with improvement of SpO2 to 95% shortly after. (Per VS flowsheet)  - Received Duonebs starting on  (Per MAR)  Options provided:  -- Acute Respiratory Failure as evidenced by, Please document evidence.  -- Acute Respiratory Failure ruled out after study  -- Other - I will add my own diagnosis  -- Disagree - Not applicable / Not valid  -- Disagree - Clinically unable to determine / Unknown  -- Refer to Clinical Documentation Reviewer    PROVIDER RESPONSE TEXT:    Acute Respiratory Failure has been ruled out after study.    Query created by: Richelle Simpson on 2025 9:15 AM      Electronically signed by:  Isidro England DO 2025 9:03 AM

## 2025-07-31 ENCOUNTER — CARE COORDINATION (OUTPATIENT)
Dept: CARE COORDINATION | Age: 85
End: 2025-07-31

## 2025-07-31 NOTE — CARE COORDINATION
Care Transitions Note    Follow Up Call     Patient Current Location:  Home: [address] [place] [address], [address]  [place] [address]    Care Transition Nurse contacted the patient by telephone. Verified name and  as identifiers.    Additional needs identified to be addressed with provider   No needs identified                 Method of communication with provider: none.    Care Summary Note: Spoke with patient today 25 for IZA/hospital discharge follow up sub call. Patient states she continues doing well and amdits breathing has improved. Patient states O2 sat today was 97% on RA. Denies any shortness of breath, cough/congestion, chest pain, chest discomfort or palpitations (Afib).     Patient states she had PT visit with [facility] today and feels she is getting stronger but still using walker. Patient states she has not called PCP as she lives on second flood apartment and is not strong enough to get out of the house and climbing steps. Patient uses a ride service thru insurance to appts and has to be able to be independent.     Patient states she did not obtain Nicotine patch ordered on hops d/c. Patient states insurance would not cover cost and she feels she does not need it as she has not had a cigarette since hops d/c.     Patient declines making f/u appt with Dr. Montana (pulm) at this time. Patient denies any complaints at this time. CTN will continue to follow.     Plan of care updates since last contact:  Education: Reiterated COPD zone tool and knowing when to call doctor or seek medical attention       Advance Care Planning:   Does patient have an Advance Directive: reviewed and current.    Medication Review:  No changes since last call.     Remote Patient Monitoring:  Offered patient enrollment in the Remote Patient Monitoring (RPM) program for in-home monitoring: Patient is not eligible for RPM program because: Follows with affiliate PCP provider.    Assessments:  Care Transitions Subsequent and Final

## 2025-08-07 ENCOUNTER — CARE COORDINATION (OUTPATIENT)
Dept: CARE COORDINATION | Age: 85
End: 2025-08-07

## (undated) DEVICE — FORCEPS BX OVL CUP FEN DISPOSABLE CAP L 160CM RAD JAW 4

## (undated) DEVICE — TRAP POLYP ETRAP

## (undated) DEVICE — GRADUATE TRIANG MEASURE 1000ML BLK PRNT

## (undated) DEVICE — SNARE POLYP M W27MMXL240CM OVL STIFF DISP CAPTIVATOR

## (undated) DEVICE — SPONGE GZ W4XL4IN RAYON POLY CVR W/NONWOVEN FAB STRL 2/PK

## (undated) DEVICE — SNARE ENDOSCP L240CM LOOP W13MM SHTH DIA2.4MM SM OVL FLX

## (undated) DEVICE — REAGENT TEST UREASE RAPD CLOTEST F/

## (undated) DEVICE — BLOCK BITE 60FR RUBBER ADLT DENTAL